# Patient Record
Sex: FEMALE | Race: WHITE | Employment: UNEMPLOYED | URBAN - NONMETROPOLITAN AREA
[De-identification: names, ages, dates, MRNs, and addresses within clinical notes are randomized per-mention and may not be internally consistent; named-entity substitution may affect disease eponyms.]

---

## 2017-01-09 RX ORDER — CLONAZEPAM 1 MG/1
TABLET ORAL
Qty: 90 TABLET | Refills: 0 | Status: SHIPPED | OUTPATIENT
Start: 2017-01-09 | End: 2017-02-06 | Stop reason: SDUPTHER

## 2017-01-09 RX ORDER — OMEPRAZOLE 20 MG/1
CAPSULE, DELAYED RELEASE ORAL
Qty: 180 CAPSULE | Refills: 3 | Status: SHIPPED | OUTPATIENT
Start: 2017-01-09 | End: 2018-01-04 | Stop reason: SDUPTHER

## 2017-01-24 DIAGNOSIS — R10.9 CHRONIC ABDOMINAL PAIN: ICD-10-CM

## 2017-01-24 DIAGNOSIS — G89.29 CHRONIC ABDOMINAL PAIN: ICD-10-CM

## 2017-01-26 RX ORDER — FENTANYL 50 UG/H
1 PATCH TRANSDERMAL
Qty: 10 PATCH | Refills: 0 | Status: SHIPPED | OUTPATIENT
Start: 2017-01-29 | End: 2017-03-01 | Stop reason: SDUPTHER

## 2017-01-30 ENCOUNTER — OFFICE VISIT (OUTPATIENT)
Dept: INTERNAL MEDICINE | Age: 62
End: 2017-01-30

## 2017-01-30 VITALS
DIASTOLIC BLOOD PRESSURE: 56 MMHG | HEART RATE: 72 BPM | HEIGHT: 69 IN | SYSTOLIC BLOOD PRESSURE: 112 MMHG | WEIGHT: 156.8 LBS | BODY MASS INDEX: 23.22 KG/M2

## 2017-01-30 DIAGNOSIS — D69.6 THROMBOCYTOPENIA (HCC): ICD-10-CM

## 2017-01-30 DIAGNOSIS — K83.01 PRIMARY SCLEROSING CHOLANGITIS: ICD-10-CM

## 2017-01-30 DIAGNOSIS — I48.91 ATRIAL FIBRILLATION WITH RVR (HCC): ICD-10-CM

## 2017-01-30 DIAGNOSIS — E11.9 CONTROLLED TYPE 2 DIABETES MELLITUS WITHOUT COMPLICATION, WITHOUT LONG-TERM CURRENT USE OF INSULIN (HCC): Primary | ICD-10-CM

## 2017-01-30 DIAGNOSIS — F32.9 MAJOR DEPRESSION, CHRONIC: ICD-10-CM

## 2017-01-30 DIAGNOSIS — G89.29 CHRONIC ABDOMINAL PAIN: ICD-10-CM

## 2017-01-30 DIAGNOSIS — N18.30 CHRONIC RENAL FAILURE, STAGE 3 (MODERATE) (HCC): ICD-10-CM

## 2017-01-30 DIAGNOSIS — Z23 NEED FOR PROPHYLACTIC VACCINATION AGAINST STREPTOCOCCUS PNEUMONIAE (PNEUMOCOCCUS): ICD-10-CM

## 2017-01-30 DIAGNOSIS — I10 ESSENTIAL HYPERTENSION: ICD-10-CM

## 2017-01-30 DIAGNOSIS — Z94.4 LIVER TRANSPLANTED (HCC): ICD-10-CM

## 2017-01-30 DIAGNOSIS — E78.5 DYSLIPIDEMIA: ICD-10-CM

## 2017-01-30 DIAGNOSIS — N82.9 FISTULA INVOLVING FEMALE GENITAL TRACT: ICD-10-CM

## 2017-01-30 DIAGNOSIS — R10.9 CHRONIC ABDOMINAL PAIN: ICD-10-CM

## 2017-01-30 DIAGNOSIS — E03.8 OTHER SPECIFIED HYPOTHYROIDISM: ICD-10-CM

## 2017-01-30 DIAGNOSIS — E55.9 VITAMIN D DEFICIENCY: ICD-10-CM

## 2017-01-30 DIAGNOSIS — M81.0 OSTEOPOROSIS: ICD-10-CM

## 2017-01-30 DIAGNOSIS — I34.0 MITRAL VALVE INSUFFICIENCY, UNSPECIFIED ETIOLOGY: ICD-10-CM

## 2017-01-30 DIAGNOSIS — Z01.89 NEEDS SLEEP APNEA ASSESSMENT: ICD-10-CM

## 2017-01-30 PROCEDURE — 99213 OFFICE O/P EST LOW 20 MIN: CPT | Performed by: INTERNAL MEDICINE

## 2017-01-30 PROCEDURE — 90732 PPSV23 VACC 2 YRS+ SUBQ/IM: CPT | Performed by: INTERNAL MEDICINE

## 2017-01-30 PROCEDURE — 90471 IMMUNIZATION ADMIN: CPT | Performed by: INTERNAL MEDICINE

## 2017-01-30 ASSESSMENT — ENCOUNTER SYMPTOMS
DIARRHEA: 0
VOMITING: 0
EYE PAIN: 0
SORE THROAT: 0
COUGH: 0
EYE DISCHARGE: 0
WHEEZING: 0
DOUBLE VISION: 0
BLOOD IN STOOL: 0
BLURRED VISION: 0
CONSTIPATION: 0
SHORTNESS OF BREATH: 0
NAUSEA: 0
ABDOMINAL PAIN: 0

## 2017-01-31 ENCOUNTER — OFFICE VISIT (OUTPATIENT)
Dept: PAIN MANAGEMENT | Age: 62
End: 2017-01-31

## 2017-01-31 VITALS
WEIGHT: 156.8 LBS | HEART RATE: 72 BPM | RESPIRATION RATE: 16 BRPM | SYSTOLIC BLOOD PRESSURE: 116 MMHG | HEIGHT: 69 IN | BODY MASS INDEX: 23.22 KG/M2 | DIASTOLIC BLOOD PRESSURE: 80 MMHG

## 2017-01-31 DIAGNOSIS — Z13.89: Primary | ICD-10-CM

## 2017-01-31 DIAGNOSIS — R10.9 CHRONIC ABDOMINAL PAIN: ICD-10-CM

## 2017-01-31 DIAGNOSIS — Z02.83 ENCOUNTER FOR DRUG SCREENING: ICD-10-CM

## 2017-01-31 DIAGNOSIS — G89.29 CHRONIC ABDOMINAL PAIN: ICD-10-CM

## 2017-01-31 DIAGNOSIS — G62.89 OTHER POLYNEUROPATHY: ICD-10-CM

## 2017-01-31 PROCEDURE — 99214 OFFICE O/P EST MOD 30 MIN: CPT | Performed by: NURSE PRACTITIONER

## 2017-01-31 PROCEDURE — 80307 DRUG TEST PRSMV CHEM ANLYZR: CPT | Performed by: NURSE PRACTITIONER

## 2017-01-31 ASSESSMENT — ENCOUNTER SYMPTOMS
ABDOMINAL PAIN: 1
VOMITING: 0
DIARRHEA: 1
CONSTIPATION: 0
NAUSEA: 1

## 2017-02-05 LAB
6-ACETYLMORPHINE, UR: NOT DETECTED
7-AMINOCLONAZEPAM, URINE: PRESENT
ALPHA-OH-ALPRAZ, URINE: NOT DETECTED
ALPRAZOLAM, URINE: NOT DETECTED
AMPHETAMINES, URINE: NOT DETECTED
BARBITURATES, URINE: NOT DETECTED
BENZOYLECGONINE, UR: NOT DETECTED
BUPRENORPHINE URINE: NOT DETECTED
CARISOPRODOL, UR: NOT DETECTED
CLONAZEPAM, URINE: NOT DETECTED
CODEINE, URINE: NOT DETECTED
CREATININE URINE: 113.4 MG/DL (ref 20–400)
DIAZEPAM, URINE: NOT DETECTED
EER PAIN MGT DRUG PANEL, HIGH RES/EMIT U: NORMAL
ETHYL GLUCURONIDE UR: NOT DETECTED
FENTANYL URINE: PRESENT
HYDROCODONE, URINE: NOT DETECTED
HYDROMORPHONE, URINE: NOT DETECTED
LORAZEPAM, URINE: NOT DETECTED
MARIJUANA METAB, UR: NOT DETECTED
MDA, UR: NOT DETECTED
MDEA, EVE, UR: NOT DETECTED
MDMA URINE: NOT DETECTED
MEPERIDINE METAB, UR: NOT DETECTED
METHADONE, URINE: NOT DETECTED
METHAMPHETAMINE, URINE: NOT DETECTED
METHYLPHENIDATE: NOT DETECTED
MIDAZOLAM, URINE: NOT DETECTED
MORPHINE URINE: NOT DETECTED
NORBUPRENORPHINE, URINE: NOT DETECTED
NORDIAZEPAM, URINE: NOT DETECTED
NORFENTANYL, URINE: PRESENT
NORHYDROCODONE, URINE: NOT DETECTED
NOROXYCODONE, URINE: NOT DETECTED
NOROXYMORPHONE, URINE: NOT DETECTED
OXAZEPAM, URINE: NOT DETECTED
OXYCODONE URINE: NOT DETECTED
OXYMORPHONE, URINE: NOT DETECTED
PAIN MGT DRUG PANEL, HI RES, UR: NORMAL
PCP,URINE: NOT DETECTED
PHENTERMINE, UR: NOT DETECTED
PROPOXYPHENE, URINE: NOT DETECTED
TAPENTADOL, URINE: NOT DETECTED
TAPENTADOL-O-SULFATE, URINE: NOT DETECTED
TEMAZEPAM, URINE: NOT DETECTED
TRAMADOL, URINE: NOT DETECTED
ZOLPIDEM, URINE: NOT DETECTED

## 2017-02-06 DIAGNOSIS — E03.8 OTHER SPECIFIED HYPOTHYROIDISM: Primary | ICD-10-CM

## 2017-02-06 RX ORDER — CLONAZEPAM 1 MG/1
TABLET ORAL
Qty: 90 TABLET | Refills: 0 | OUTPATIENT
Start: 2017-02-06 | End: 2017-03-06 | Stop reason: SDUPTHER

## 2017-02-28 ENCOUNTER — HOSPITAL ENCOUNTER (EMERGENCY)
Age: 62
Discharge: HOME | End: 2017-03-01
Attending: EMERGENCY MEDICINE
Payer: COMMERCIAL

## 2017-02-28 DIAGNOSIS — R11.2 NAUSEA VOMITING AND DIARRHEA: Primary | ICD-10-CM

## 2017-02-28 DIAGNOSIS — R19.7 NAUSEA VOMITING AND DIARRHEA: Primary | ICD-10-CM

## 2017-02-28 DIAGNOSIS — R10.30 LOWER ABDOMINAL PAIN: ICD-10-CM

## 2017-02-28 LAB
ABSOLUTE EOS #: 0.7 K/UL (ref 0–0.4)
ABSOLUTE LYMPH #: 1.8 K/UL (ref 1–4.8)
ABSOLUTE MONO #: 0.3 K/UL (ref 0.1–1.2)
ALBUMIN SERPL-MCNC: 4.1 G/DL (ref 3.5–5.2)
ALBUMIN/GLOBULIN RATIO: 1.2 (ref 1–2.5)
ALP BLD-CCNC: 86 U/L (ref 35–104)
ALT SERPL-CCNC: 18 U/L (ref 5–33)
ANION GAP SERPL CALCULATED.3IONS-SCNC: 14 MMOL/L (ref 9–17)
AST SERPL-CCNC: 25 U/L
BASOPHILS # BLD: 1 % (ref 0–2)
BASOPHILS ABSOLUTE: 0 K/UL (ref 0–0.2)
BILIRUB SERPL-MCNC: 0.18 MG/DL (ref 0.3–1.2)
BUN BLDV-MCNC: 29 MG/DL (ref 6–20)
BUN/CREAT BLD: 19 (ref 9–20)
CALCIUM SERPL-MCNC: 9.3 MG/DL (ref 8.6–10.4)
CHLORIDE BLD-SCNC: 101 MMOL/L (ref 98–107)
CO2: 25 MMOL/L (ref 20–31)
CREAT SERPL-MCNC: 1.53 MG/DL (ref 0.5–0.9)
DIFFERENTIAL TYPE: ABNORMAL
EOSINOPHILS RELATIVE PERCENT: 15 % (ref 1–4)
GFR AFRICAN AMERICAN: 42 ML/MIN
GFR NON-AFRICAN AMERICAN: 35 ML/MIN
GFR SERPL CREATININE-BSD FRML MDRD: ABNORMAL ML/MIN/{1.73_M2}
GFR SERPL CREATININE-BSD FRML MDRD: ABNORMAL ML/MIN/{1.73_M2}
GLUCOSE BLD-MCNC: 159 MG/DL (ref 70–99)
HCT VFR BLD CALC: 35.5 % (ref 36–46)
HEMOGLOBIN: 12 G/DL (ref 12–16)
LIPASE: 37 U/L (ref 13–60)
LYMPHOCYTES # BLD: 39 % (ref 24–44)
MAGNESIUM: 1.9 MG/DL (ref 1.6–2.6)
MCH RBC QN AUTO: 30 PG (ref 26–34)
MCHC RBC AUTO-ENTMCNC: 33.8 G/DL (ref 31–37)
MCV RBC AUTO: 88.8 FL (ref 80–100)
MONOCYTES # BLD: 7 % (ref 1–7)
PDW BLD-RTO: 14.2 % (ref 11–14.5)
PLATELET # BLD: 141 K/UL (ref 140–450)
PLATELET ESTIMATE: ABNORMAL
PMV BLD AUTO: 7 FL (ref 6–12)
POTASSIUM SERPL-SCNC: 3.7 MMOL/L (ref 3.7–5.3)
RBC # BLD: 4 M/UL (ref 4–5.2)
RBC # BLD: ABNORMAL 10*6/UL
SEG NEUTROPHILS: 38 % (ref 36–66)
SEGMENTED NEUTROPHILS ABSOLUTE COUNT: 1.9 K/UL (ref 1.8–7.7)
SODIUM BLD-SCNC: 140 MMOL/L (ref 135–144)
TOTAL PROTEIN: 7.5 G/DL (ref 6.4–8.3)
WBC # BLD: 4.7 K/UL (ref 3.5–11)
WBC # BLD: ABNORMAL 10*3/UL

## 2017-02-28 PROCEDURE — 99284 EMERGENCY DEPT VISIT MOD MDM: CPT

## 2017-02-28 PROCEDURE — 36415 COLL VENOUS BLD VENIPUNCTURE: CPT

## 2017-02-28 PROCEDURE — 6360000002 HC RX W HCPCS: Performed by: EMERGENCY MEDICINE

## 2017-02-28 PROCEDURE — 85025 COMPLETE CBC W/AUTO DIFF WBC: CPT

## 2017-02-28 PROCEDURE — 6370000000 HC RX 637 (ALT 250 FOR IP): Performed by: EMERGENCY MEDICINE

## 2017-02-28 PROCEDURE — 2580000003 HC RX 258: Performed by: EMERGENCY MEDICINE

## 2017-02-28 PROCEDURE — 83690 ASSAY OF LIPASE: CPT

## 2017-02-28 PROCEDURE — 80053 COMPREHEN METABOLIC PANEL: CPT

## 2017-02-28 PROCEDURE — 83735 ASSAY OF MAGNESIUM: CPT

## 2017-02-28 RX ORDER — ONDANSETRON 4 MG/1
4 TABLET, ORALLY DISINTEGRATING ORAL EVERY 8 HOURS PRN
Qty: 20 TABLET | Refills: 0 | Status: SHIPPED | OUTPATIENT
Start: 2017-02-28

## 2017-02-28 RX ORDER — METRONIDAZOLE 500 MG/1
500 TABLET ORAL 2 TIMES DAILY
Qty: 14 TABLET | Refills: 0 | Status: SHIPPED | OUTPATIENT
Start: 2017-02-28 | End: 2017-03-07

## 2017-02-28 RX ORDER — CIPROFLOXACIN 2 MG/ML
400 INJECTION, SOLUTION INTRAVENOUS ONCE
Status: COMPLETED | OUTPATIENT
Start: 2017-02-28 | End: 2017-02-28

## 2017-02-28 RX ORDER — ONDANSETRON 2 MG/ML
4 INJECTION INTRAMUSCULAR; INTRAVENOUS ONCE
Status: COMPLETED | OUTPATIENT
Start: 2017-02-28 | End: 2017-02-28

## 2017-02-28 RX ORDER — CIPROFLOXACIN 500 MG/1
500 TABLET, FILM COATED ORAL 2 TIMES DAILY
Qty: 14 TABLET | Refills: 0 | Status: SHIPPED | OUTPATIENT
Start: 2017-02-28 | End: 2017-03-07

## 2017-02-28 RX ORDER — ACETAMINOPHEN 325 MG/1
650 TABLET ORAL ONCE
Status: COMPLETED | OUTPATIENT
Start: 2017-02-28 | End: 2017-02-28

## 2017-02-28 RX ORDER — METRONIDAZOLE 250 MG/1
500 TABLET ORAL ONCE
Status: COMPLETED | OUTPATIENT
Start: 2017-02-28 | End: 2017-02-28

## 2017-02-28 RX ORDER — 0.9 % SODIUM CHLORIDE 0.9 %
1000 INTRAVENOUS SOLUTION INTRAVENOUS ONCE
Status: COMPLETED | OUTPATIENT
Start: 2017-02-28 | End: 2017-02-28

## 2017-02-28 RX ADMIN — METRONIDAZOLE 500 MG: 250 TABLET ORAL at 22:58

## 2017-02-28 RX ADMIN — SODIUM CHLORIDE 1000 ML: 9 INJECTION, SOLUTION INTRAVENOUS at 20:53

## 2017-02-28 RX ADMIN — ONDANSETRON 4 MG: 2 INJECTION INTRAMUSCULAR; INTRAVENOUS at 22:03

## 2017-02-28 RX ADMIN — ACETAMINOPHEN 650 MG: 325 TABLET ORAL at 22:03

## 2017-02-28 RX ADMIN — CIPROFLOXACIN 400 MG: 2 INJECTION, SOLUTION INTRAVENOUS at 22:58

## 2017-02-28 ASSESSMENT — PAIN SCALES - GENERAL
PAINLEVEL_OUTOF10: 5
PAINLEVEL_OUTOF10: 5
PAINLEVEL_OUTOF10: 8

## 2017-02-28 ASSESSMENT — PAIN DESCRIPTION - PROGRESSION: CLINICAL_PROGRESSION: NOT CHANGED

## 2017-02-28 ASSESSMENT — ENCOUNTER SYMPTOMS
BACK PAIN: 0
ABDOMINAL PAIN: 1
RHINORRHEA: 0
SHORTNESS OF BREATH: 0
EYE PAIN: 0
VOMITING: 1
NAUSEA: 1
DIARRHEA: 1
COUGH: 0
SORE THROAT: 0

## 2017-02-28 ASSESSMENT — PAIN DESCRIPTION - LOCATION: LOCATION: ABDOMEN

## 2017-02-28 ASSESSMENT — PAIN DESCRIPTION - FREQUENCY: FREQUENCY: INTERMITTENT

## 2017-02-28 ASSESSMENT — PAIN DESCRIPTION - DESCRIPTORS: DESCRIPTORS: CRAMPING

## 2017-02-28 ASSESSMENT — PAIN DESCRIPTION - ONSET: ONSET: ON-GOING

## 2017-02-28 ASSESSMENT — PAIN DESCRIPTION - PAIN TYPE: TYPE: ACUTE PAIN

## 2017-03-01 VITALS
SYSTOLIC BLOOD PRESSURE: 112 MMHG | BODY MASS INDEX: 21.92 KG/M2 | DIASTOLIC BLOOD PRESSURE: 66 MMHG | HEART RATE: 65 BPM | TEMPERATURE: 97.3 F | OXYGEN SATURATION: 98 % | WEIGHT: 148 LBS | HEIGHT: 69 IN | RESPIRATION RATE: 14 BRPM

## 2017-03-01 DIAGNOSIS — R10.9 CHRONIC ABDOMINAL PAIN: ICD-10-CM

## 2017-03-01 DIAGNOSIS — G89.29 CHRONIC ABDOMINAL PAIN: ICD-10-CM

## 2017-03-01 PROCEDURE — 96375 TX/PRO/DX INJ NEW DRUG ADDON: CPT

## 2017-03-01 PROCEDURE — 96365 THER/PROPH/DIAG IV INF INIT: CPT

## 2017-03-01 ASSESSMENT — PAIN SCALES - GENERAL: PAINLEVEL_OUTOF10: 5

## 2017-03-03 RX ORDER — FENTANYL 50 UG/H
1 PATCH TRANSDERMAL
Qty: 10 PATCH | Refills: 0 | Status: SHIPPED | OUTPATIENT
Start: 2017-03-03 | End: 2017-04-07 | Stop reason: SDUPTHER

## 2017-03-06 RX ORDER — CLONAZEPAM 1 MG/1
TABLET ORAL
Qty: 90 TABLET | Refills: 0 | Status: SHIPPED | OUTPATIENT
Start: 2017-03-06 | End: 2017-04-04 | Stop reason: SDUPTHER

## 2017-03-07 ENCOUNTER — TELEPHONE (OUTPATIENT)
Dept: INTERNAL MEDICINE | Age: 62
End: 2017-03-07

## 2017-03-17 LAB
CHOLESTEROL, TOTAL: 143 MG/DL
CHOLESTEROL/HDL RATIO: 3.04
HDLC SERPL-MCNC: 47 MG/DL (ref 35–70)
LDL CHOLESTEROL CALCULATED: 61 MG/DL (ref 0–160)
TRIGL SERPL-MCNC: 176 MG/DL
VLDLC SERPL CALC-MCNC: 35 MG/DL

## 2017-04-04 RX ORDER — CLONAZEPAM 1 MG/1
TABLET ORAL
Qty: 90 TABLET | Refills: 0 | Status: SHIPPED | OUTPATIENT
Start: 2017-04-04 | End: 2017-05-01 | Stop reason: SDUPTHER

## 2017-04-07 ENCOUNTER — OFFICE VISIT (OUTPATIENT)
Dept: PAIN MANAGEMENT | Age: 62
End: 2017-04-07
Payer: COMMERCIAL

## 2017-04-07 VITALS
BODY MASS INDEX: 23.31 KG/M2 | DIASTOLIC BLOOD PRESSURE: 70 MMHG | HEIGHT: 69 IN | SYSTOLIC BLOOD PRESSURE: 102 MMHG | WEIGHT: 157.4 LBS | HEART RATE: 76 BPM

## 2017-04-07 DIAGNOSIS — G89.29 CHRONIC ABDOMINAL PAIN: ICD-10-CM

## 2017-04-07 DIAGNOSIS — R10.9 CHRONIC ABDOMINAL PAIN: ICD-10-CM

## 2017-04-07 DIAGNOSIS — G62.89 OTHER POLYNEUROPATHY: Primary | ICD-10-CM

## 2017-04-07 PROCEDURE — 99214 OFFICE O/P EST MOD 30 MIN: CPT | Performed by: NURSE PRACTITIONER

## 2017-04-07 ASSESSMENT — ENCOUNTER SYMPTOMS
NAUSEA: 1
ABDOMINAL PAIN: 1
DIARRHEA: 1
CONSTIPATION: 0
VOMITING: 0

## 2017-04-10 RX ORDER — FENTANYL 50 UG/H
1 PATCH TRANSDERMAL
Qty: 10 PATCH | Refills: 0 | Status: SHIPPED | OUTPATIENT
Start: 2017-04-10 | End: 2017-05-22 | Stop reason: SDUPTHER

## 2017-04-24 ENCOUNTER — OFFICE VISIT (OUTPATIENT)
Dept: PRIMARY CARE CLINIC | Age: 62
End: 2017-04-24
Payer: COMMERCIAL

## 2017-04-24 VITALS
WEIGHT: 155.4 LBS | OXYGEN SATURATION: 98 % | RESPIRATION RATE: 12 BRPM | TEMPERATURE: 98.4 F | SYSTOLIC BLOOD PRESSURE: 96 MMHG | BODY MASS INDEX: 23.02 KG/M2 | HEIGHT: 69 IN | HEART RATE: 87 BPM | DIASTOLIC BLOOD PRESSURE: 78 MMHG

## 2017-04-24 DIAGNOSIS — T14.8XXA BLOOD BLISTER: Primary | ICD-10-CM

## 2017-04-24 PROCEDURE — 99213 OFFICE O/P EST LOW 20 MIN: CPT | Performed by: NURSE PRACTITIONER

## 2017-04-24 RX ORDER — AMOXICILLIN 500 MG/1
500 CAPSULE ORAL 3 TIMES DAILY
Qty: 15 CAPSULE | Refills: 0 | Status: SHIPPED | OUTPATIENT
Start: 2017-04-24 | End: 2017-04-29

## 2017-04-24 ASSESSMENT — ENCOUNTER SYMPTOMS
ABDOMINAL PAIN: 0
COUGH: 0
TROUBLE SWALLOWING: 0
NAUSEA: 0
ALLERGIC/IMMUNOLOGIC NEGATIVE: 1
EYES NEGATIVE: 1
DIARRHEA: 0
RESPIRATORY NEGATIVE: 1
GASTROINTESTINAL NEGATIVE: 1
VOMITING: 0
SHORTNESS OF BREATH: 0
CONSTIPATION: 0
SINUS PRESSURE: 0
CHEST TIGHTNESS: 0

## 2017-05-01 RX ORDER — CLONAZEPAM 1 MG/1
TABLET ORAL
Qty: 90 TABLET | Refills: 0 | Status: SHIPPED | OUTPATIENT
Start: 2017-05-01 | End: 2017-06-14 | Stop reason: SDUPTHER

## 2017-05-22 DIAGNOSIS — G89.29 CHRONIC ABDOMINAL PAIN: ICD-10-CM

## 2017-05-22 DIAGNOSIS — R10.9 CHRONIC ABDOMINAL PAIN: ICD-10-CM

## 2017-05-23 RX ORDER — FENTANYL 50 UG/H
1 PATCH TRANSDERMAL
Qty: 10 PATCH | Refills: 0 | Status: SHIPPED | OUTPATIENT
Start: 2017-05-23 | End: 2017-07-25 | Stop reason: DRUGHIGH

## 2017-06-07 ENCOUNTER — OFFICE VISIT (OUTPATIENT)
Dept: PAIN MANAGEMENT | Age: 62
End: 2017-06-07
Payer: COMMERCIAL

## 2017-06-07 ENCOUNTER — OFFICE VISIT (OUTPATIENT)
Dept: OPHTHALMOLOGY | Age: 62
End: 2017-06-07
Payer: COMMERCIAL

## 2017-06-07 VITALS
DIASTOLIC BLOOD PRESSURE: 82 MMHG | BODY MASS INDEX: 23.61 KG/M2 | SYSTOLIC BLOOD PRESSURE: 114 MMHG | HEART RATE: 64 BPM | RESPIRATION RATE: 16 BRPM | HEIGHT: 69 IN | WEIGHT: 159.4 LBS

## 2017-06-07 DIAGNOSIS — H25.9 SENILE CATARACTS OF BOTH EYES: ICD-10-CM

## 2017-06-07 DIAGNOSIS — Z02.83 ENCOUNTER FOR DRUG SCREENING: ICD-10-CM

## 2017-06-07 DIAGNOSIS — G62.89 OTHER POLYNEUROPATHY: ICD-10-CM

## 2017-06-07 DIAGNOSIS — R10.9 CHRONIC ABDOMINAL PAIN: Primary | ICD-10-CM

## 2017-06-07 DIAGNOSIS — Z79.891 ENCOUNTER FOR LONG-TERM OPIATE ANALGESIC USE: ICD-10-CM

## 2017-06-07 DIAGNOSIS — G89.29 CHRONIC ABDOMINAL PAIN: Primary | ICD-10-CM

## 2017-06-07 DIAGNOSIS — E11.9 CONTROLLED TYPE 2 DIABETES MELLITUS WITHOUT COMPLICATION, WITHOUT LONG-TERM CURRENT USE OF INSULIN (HCC): Primary | ICD-10-CM

## 2017-06-07 PROCEDURE — 99214 OFFICE O/P EST MOD 30 MIN: CPT | Performed by: NURSE PRACTITIONER

## 2017-06-07 PROCEDURE — 99204 OFFICE O/P NEW MOD 45 MIN: CPT | Performed by: OPHTHALMOLOGY

## 2017-06-07 PROCEDURE — 80307 DRUG TEST PRSMV CHEM ANLYZR: CPT | Performed by: NURSE PRACTITIONER

## 2017-06-07 RX ORDER — TROPICAMIDE 10 MG/ML
1 SOLUTION/ DROPS OPHTHALMIC ONCE
Status: COMPLETED | OUTPATIENT
Start: 2017-06-07 | End: 2017-06-07

## 2017-06-07 RX ORDER — FENTANYL 37.5 UG/H
1 PATCH, EXTENDED RELEASE TRANSDERMAL
Qty: 10 PATCH | Refills: 0 | Status: SHIPPED | OUTPATIENT
Start: 2017-06-22 | End: 2017-07-25 | Stop reason: SDUPTHER

## 2017-06-07 RX ORDER — BENOXINATE HCL/FLUORESCEIN SOD 0.4%-0.25%
1 DROPS OPHTHALMIC (EYE) ONCE
Status: COMPLETED | OUTPATIENT
Start: 2017-06-07 | End: 2017-06-07

## 2017-06-07 RX ORDER — PHENYLEPHRINE HCL 2.5 %
1 DROPS OPHTHALMIC (EYE) ONCE
Status: COMPLETED | OUTPATIENT
Start: 2017-06-07 | End: 2017-06-07

## 2017-06-07 RX ADMIN — Medication 1 DROP: at 14:46

## 2017-06-07 RX ADMIN — Medication 1 DROP: at 14:45

## 2017-06-07 RX ADMIN — TROPICAMIDE 1 DROP: 10 SOLUTION/ DROPS OPHTHALMIC at 14:46

## 2017-06-07 ASSESSMENT — ENCOUNTER SYMPTOMS
ABDOMINAL PAIN: 1
CONSTIPATION: 0
DIARRHEA: 1
BLURRED VISION: 1
RESPIRATORY NEGATIVE: 1
GASTROINTESTINAL NEGATIVE: 1
VOMITING: 0
NAUSEA: 1

## 2017-06-07 ASSESSMENT — CONF VISUAL FIELD
OD_NORMAL: 1
OS_NORMAL: 1
METHOD: COUNTING FINGERS

## 2017-06-07 ASSESSMENT — SLIT LAMP EXAM - LIDS
COMMENTS: 1+ DERMATOCHALASIS - UPPER LID
COMMENTS: 1+ DERMATOCHALASIS - UPPER LID

## 2017-06-07 ASSESSMENT — TONOMETRY
OD_IOP_MMHG: 18
OS_IOP_MMHG: 21
IOP_METHOD: APPLANATION W FLURESS DROP

## 2017-06-07 ASSESSMENT — VISUAL ACUITY
METHOD: SNELLEN - LINEAR
OS_CC: 20/40
CORRECTION_TYPE: GLASSES

## 2017-06-11 LAB
6-ACETYLMORPHINE, UR: NOT DETECTED
7-AMINOCLONAZEPAM, URINE: PRESENT
ALPHA-OH-ALPRAZ, URINE: NOT DETECTED
ALPRAZOLAM, URINE: NOT DETECTED
AMPHETAMINES, URINE: NOT DETECTED
BARBITURATES, URINE: NOT DETECTED
BENZOYLECGONINE, UR: NOT DETECTED
BUPRENORPHINE URINE: NOT DETECTED
CARISOPRODOL, UR: NOT DETECTED
CLONAZEPAM, URINE: NOT DETECTED
CODEINE, URINE: NOT DETECTED
CREATININE URINE: 150.2 MG/DL (ref 20–400)
DIAZEPAM, URINE: NOT DETECTED
EER PAIN MGT DRUG PANEL, HIGH RES/EMIT U: NORMAL
ETHYL GLUCURONIDE UR: NORMAL
FENTANYL URINE: PRESENT
HYDROCODONE, URINE: NOT DETECTED
HYDROMORPHONE, URINE: PRESENT
LORAZEPAM, URINE: NOT DETECTED
MARIJUANA METAB, UR: NOT DETECTED
MDA, UR: NOT DETECTED
MDEA, EVE, UR: NOT DETECTED
MDMA URINE: NOT DETECTED
MEPERIDINE METAB, UR: NOT DETECTED
METHADONE, URINE: NOT DETECTED
METHAMPHETAMINE, URINE: NOT DETECTED
METHYLPHENIDATE: NOT DETECTED
MIDAZOLAM, URINE: NOT DETECTED
MORPHINE URINE: NOT DETECTED
NORBUPRENORPHINE, URINE: NOT DETECTED
NORDIAZEPAM, URINE: NOT DETECTED
NORFENTANYL, URINE: PRESENT
NORHYDROCODONE, URINE: NOT DETECTED
NOROXYCODONE, URINE: NOT DETECTED
NOROXYMORPHONE, URINE: NOT DETECTED
OXAZEPAM, URINE: NOT DETECTED
OXYCODONE URINE: NOT DETECTED
OXYMORPHONE, URINE: NOT DETECTED
PAIN MGT DRUG PANEL, HI RES, UR: NORMAL
PCP,URINE: NOT DETECTED
PHENTERMINE, UR: NOT DETECTED
PROPOXYPHENE, URINE: NOT DETECTED
TAPENTADOL, URINE: NOT DETECTED
TAPENTADOL-O-SULFATE, URINE: NOT DETECTED
TEMAZEPAM, URINE: NOT DETECTED
TRAMADOL, URINE: NOT DETECTED
ZOLPIDEM, URINE: NOT DETECTED

## 2017-06-14 RX ORDER — CLONAZEPAM 1 MG/1
TABLET ORAL
Qty: 90 TABLET | Refills: 0 | Status: SHIPPED | OUTPATIENT
Start: 2017-06-14 | End: 2017-09-27 | Stop reason: SDUPTHER

## 2017-06-22 RX ORDER — LEVOTHYROXINE SODIUM 0.12 MG/1
TABLET ORAL
Qty: 30 TABLET | Refills: 11 | Status: SHIPPED | OUTPATIENT
Start: 2017-06-22 | End: 2018-06-28 | Stop reason: SDUPTHER

## 2017-06-28 ENCOUNTER — OFFICE VISIT (OUTPATIENT)
Dept: OPHTHALMOLOGY | Age: 62
End: 2017-06-28
Payer: COMMERCIAL

## 2017-06-28 DIAGNOSIS — E11.9 CONTROLLED TYPE 2 DIABETES MELLITUS WITHOUT COMPLICATION, WITHOUT LONG-TERM CURRENT USE OF INSULIN (HCC): ICD-10-CM

## 2017-06-28 DIAGNOSIS — H25.9 SENILE CATARACTS OF BOTH EYES: ICD-10-CM

## 2017-06-28 DIAGNOSIS — H35.053: Primary | ICD-10-CM

## 2017-06-28 PROCEDURE — 99214 OFFICE O/P EST MOD 30 MIN: CPT | Performed by: OPHTHALMOLOGY

## 2017-06-28 PROCEDURE — 92134 CPTRZ OPH DX IMG PST SGM RTA: CPT | Performed by: OPHTHALMOLOGY

## 2017-06-28 RX ORDER — TROPICAMIDE 10 MG/ML
1 SOLUTION/ DROPS OPHTHALMIC ONCE
Status: COMPLETED | OUTPATIENT
Start: 2017-06-28 | End: 2017-06-28

## 2017-06-28 RX ORDER — BENOXINATE HCL/FLUORESCEIN SOD 0.4%-0.25%
1 DROPS OPHTHALMIC (EYE) ONCE
Status: COMPLETED | OUTPATIENT
Start: 2017-06-28 | End: 2017-06-28

## 2017-06-28 RX ORDER — PHENYLEPHRINE HCL 2.5 %
1 DROPS OPHTHALMIC (EYE) ONCE
Status: COMPLETED | OUTPATIENT
Start: 2017-06-28 | End: 2017-06-28

## 2017-06-28 RX ADMIN — Medication 1 DROP: at 16:03

## 2017-06-28 RX ADMIN — TROPICAMIDE 1 DROP: 10 SOLUTION/ DROPS OPHTHALMIC at 16:04

## 2017-06-28 ASSESSMENT — TONOMETRY
IOP_METHOD: TONOPEN
OS_IOP_MMHG: 21
OD_IOP_MMHG: 16

## 2017-06-28 ASSESSMENT — REFRACTION_MANIFEST
OD_CYLINDER: -0.50
OS_CYLINDER: -0.25
OD_AXIS: 120
OD_SPHERE: +1.75
OS_SPHERE: +1.00
OS_AXIS: 013

## 2017-06-28 ASSESSMENT — VISUAL ACUITY
OS_CC: 20/40
OS_BAT_MED: 20/25
METHOD: SNELLEN - LINEAR
CORRECTION_TYPE: GLASSES

## 2017-06-28 ASSESSMENT — SLIT LAMP EXAM - LIDS
COMMENTS: 1+ DERMATOCHALASIS - UPPER LID
COMMENTS: 1+ DERMATOCHALASIS - UPPER LID

## 2017-07-06 ENCOUNTER — OFFICE VISIT (OUTPATIENT)
Dept: OPHTHALMOLOGY | Age: 62
End: 2017-07-06
Payer: COMMERCIAL

## 2017-07-06 DIAGNOSIS — H25.13 NUCLEAR SCLEROSIS, BILATERAL: Primary | ICD-10-CM

## 2017-07-06 PROCEDURE — 92136 OPHTHALMIC BIOMETRY: CPT | Performed by: OPHTHALMOLOGY

## 2017-07-07 ENCOUNTER — TELEPHONE (OUTPATIENT)
Dept: OPHTHALMOLOGY | Age: 62
End: 2017-07-07

## 2017-07-13 RX ORDER — SULFAMETHOXAZOLE AND TRIMETHOPRIM 800; 160 MG/1; MG/1
TABLET ORAL
Qty: 30 TABLET | Refills: 3 | Status: SHIPPED | OUTPATIENT
Start: 2017-07-13 | End: 2017-11-03 | Stop reason: SDUPTHER

## 2017-07-25 ENCOUNTER — OFFICE VISIT (OUTPATIENT)
Dept: INTERNAL MEDICINE | Age: 62
End: 2017-07-25
Payer: COMMERCIAL

## 2017-07-25 ENCOUNTER — TELEPHONE (OUTPATIENT)
Dept: PAIN MANAGEMENT | Age: 62
End: 2017-07-25

## 2017-07-25 VITALS
HEIGHT: 69 IN | DIASTOLIC BLOOD PRESSURE: 50 MMHG | WEIGHT: 155.2 LBS | HEART RATE: 84 BPM | BODY MASS INDEX: 22.99 KG/M2 | SYSTOLIC BLOOD PRESSURE: 110 MMHG

## 2017-07-25 DIAGNOSIS — G89.29 CHRONIC ABDOMINAL PAIN: ICD-10-CM

## 2017-07-25 DIAGNOSIS — N82.9 FISTULA INVOLVING FEMALE GENITAL TRACT: ICD-10-CM

## 2017-07-25 DIAGNOSIS — E55.9 VITAMIN D DEFICIENCY: ICD-10-CM

## 2017-07-25 DIAGNOSIS — E78.5 DYSLIPIDEMIA: ICD-10-CM

## 2017-07-25 DIAGNOSIS — R10.9 CHRONIC ABDOMINAL PAIN: ICD-10-CM

## 2017-07-25 DIAGNOSIS — I10 ESSENTIAL HYPERTENSION: ICD-10-CM

## 2017-07-25 DIAGNOSIS — I48.91 ATRIAL FIBRILLATION WITH RVR (HCC): ICD-10-CM

## 2017-07-25 DIAGNOSIS — D69.6 THROMBOCYTOPENIA (HCC): ICD-10-CM

## 2017-07-25 DIAGNOSIS — K83.01 PRIMARY SCLEROSING CHOLANGITIS: ICD-10-CM

## 2017-07-25 DIAGNOSIS — N18.30 CHRONIC RENAL FAILURE, STAGE 3 (MODERATE) (HCC): ICD-10-CM

## 2017-07-25 DIAGNOSIS — E11.9 CONTROLLED TYPE 2 DIABETES MELLITUS WITHOUT COMPLICATION, WITHOUT LONG-TERM CURRENT USE OF INSULIN (HCC): Primary | ICD-10-CM

## 2017-07-25 DIAGNOSIS — F32.9 MAJOR DEPRESSION, CHRONIC: ICD-10-CM

## 2017-07-25 DIAGNOSIS — M81.0 OSTEOPOROSIS WITHOUT CURRENT PATHOLOGICAL FRACTURE, UNSPECIFIED OSTEOPOROSIS TYPE: ICD-10-CM

## 2017-07-25 DIAGNOSIS — Z94.4 LIVER TRANSPLANTED (HCC): ICD-10-CM

## 2017-07-25 DIAGNOSIS — I34.0 MITRAL VALVE INSUFFICIENCY, UNSPECIFIED ETIOLOGY: ICD-10-CM

## 2017-07-25 DIAGNOSIS — E03.8 OTHER SPECIFIED HYPOTHYROIDISM: ICD-10-CM

## 2017-07-25 PROCEDURE — 99214 OFFICE O/P EST MOD 30 MIN: CPT | Performed by: INTERNAL MEDICINE

## 2017-07-25 RX ORDER — SERTRALINE HYDROCHLORIDE 100 MG/1
TABLET, FILM COATED ORAL
Qty: 30 TABLET | Refills: 0 | Status: SHIPPED | OUTPATIENT
Start: 2017-07-25 | End: 2017-07-25 | Stop reason: SDUPTHER

## 2017-07-25 RX ORDER — FENTANYL 37.5 UG/H
1 PATCH, EXTENDED RELEASE TRANSDERMAL
Qty: 10 PATCH | Refills: 0 | Status: SHIPPED | OUTPATIENT
Start: 2017-07-25 | End: 2017-08-08 | Stop reason: SDUPTHER

## 2017-07-26 RX ORDER — SERTRALINE HYDROCHLORIDE 100 MG/1
TABLET, FILM COATED ORAL
Qty: 90 TABLET | Refills: 3 | Status: SHIPPED | OUTPATIENT
Start: 2017-07-26 | End: 2018-07-23 | Stop reason: SDUPTHER

## 2017-07-26 RX ORDER — BUPIVACAINE HYDROCHLORIDE 5 MG/ML
1 INJECTION, SOLUTION EPIDURAL; INTRACAUDAL ONCE
Status: CANCELLED | OUTPATIENT
Start: 2017-07-26 | End: 2017-07-26

## 2017-07-26 RX ORDER — CYCLOPENTOLATE HYDROCHLORIDE 10 MG/ML
1 SOLUTION/ DROPS OPHTHALMIC EVERY 5 MIN PRN
Status: CANCELLED | OUTPATIENT
Start: 2017-07-26

## 2017-07-26 RX ORDER — SODIUM CHLORIDE, SODIUM LACTATE, POTASSIUM CHLORIDE, CALCIUM CHLORIDE 600; 310; 30; 20 MG/100ML; MG/100ML; MG/100ML; MG/100ML
INJECTION, SOLUTION INTRAVENOUS CONTINUOUS
Status: CANCELLED | OUTPATIENT
Start: 2017-07-26

## 2017-07-26 RX ORDER — PHENYLEPHRINE HCL 2.5 %
1 DROPS OPHTHALMIC (EYE) EVERY 5 MIN PRN
Status: CANCELLED | OUTPATIENT
Start: 2017-07-26

## 2017-07-26 RX ORDER — LORAZEPAM 1 MG/1
1 TABLET ORAL ONCE
Status: CANCELLED | OUTPATIENT
Start: 2017-07-26 | End: 2017-07-26

## 2017-07-26 RX ORDER — KETOROLAC TROMETHAMINE 5 MG/ML
1 SOLUTION OPHTHALMIC EVERY 5 MIN PRN
Status: CANCELLED | OUTPATIENT
Start: 2017-07-26

## 2017-07-26 RX ORDER — TOBRAMYCIN 3 MG/ML
1 SOLUTION/ DROPS OPHTHALMIC EVERY 5 MIN PRN
Status: CANCELLED | OUTPATIENT
Start: 2017-07-26

## 2017-07-29 ASSESSMENT — ENCOUNTER SYMPTOMS
SORE THROAT: 0
EYE PAIN: 0
DOUBLE VISION: 0
BLURRED VISION: 0
NAUSEA: 0
DIARRHEA: 0
ABDOMINAL PAIN: 0
BLOOD IN STOOL: 0
SHORTNESS OF BREATH: 0
EYE DISCHARGE: 0
COUGH: 0
CONSTIPATION: 0
VOMITING: 0
WHEEZING: 0

## 2017-08-01 ENCOUNTER — TELEPHONE (OUTPATIENT)
Dept: INTERNAL MEDICINE | Age: 62
End: 2017-08-01

## 2017-08-01 DIAGNOSIS — E78.5 DYSLIPIDEMIA: Primary | ICD-10-CM

## 2017-08-01 RX ORDER — ATORVASTATIN CALCIUM 10 MG/1
10 TABLET, FILM COATED ORAL DAILY
Qty: 30 TABLET | Refills: 11 | Status: SHIPPED | OUTPATIENT
Start: 2017-08-01 | End: 2019-03-04

## 2017-08-02 ENCOUNTER — HOSPITAL ENCOUNTER (OUTPATIENT)
Age: 62
Setting detail: OUTPATIENT SURGERY
Discharge: HOME | End: 2017-08-02
Attending: OPHTHALMOLOGY | Admitting: OPHTHALMOLOGY
Payer: COMMERCIAL

## 2017-08-02 ENCOUNTER — ANESTHESIA EVENT (OUTPATIENT)
Dept: OPERATING ROOM | Age: 62
End: 2017-08-02
Payer: COMMERCIAL

## 2017-08-02 ENCOUNTER — ANESTHESIA (OUTPATIENT)
Dept: OPERATING ROOM | Age: 62
End: 2017-08-02
Payer: COMMERCIAL

## 2017-08-02 VITALS
HEIGHT: 69 IN | OXYGEN SATURATION: 100 % | DIASTOLIC BLOOD PRESSURE: 79 MMHG | BODY MASS INDEX: 23.25 KG/M2 | RESPIRATION RATE: 16 BRPM | SYSTOLIC BLOOD PRESSURE: 108 MMHG | HEART RATE: 72 BPM | TEMPERATURE: 98.4 F | WEIGHT: 157 LBS

## 2017-08-02 VITALS — OXYGEN SATURATION: 100 % | DIASTOLIC BLOOD PRESSURE: 69 MMHG | SYSTOLIC BLOOD PRESSURE: 132 MMHG

## 2017-08-02 PROBLEM — H25.11 CATARACT, NUCLEAR SCLEROTIC, RIGHT EYE: Status: ACTIVE | Noted: 2017-08-02

## 2017-08-02 PROCEDURE — 7100000011 HC PHASE II RECOVERY - ADDTL 15 MIN: Performed by: OPHTHALMOLOGY

## 2017-08-02 PROCEDURE — 6360000002 HC RX W HCPCS: Performed by: NURSE ANESTHETIST, CERTIFIED REGISTERED

## 2017-08-02 PROCEDURE — 2500000003 HC RX 250 WO HCPCS

## 2017-08-02 PROCEDURE — 3600000012 HC SURGERY LEVEL 2 ADDTL 15MIN: Performed by: OPHTHALMOLOGY

## 2017-08-02 PROCEDURE — 3600000002 HC SURGERY LEVEL 2 BASE: Performed by: OPHTHALMOLOGY

## 2017-08-02 PROCEDURE — 2580000003 HC RX 258: Performed by: OPHTHALMOLOGY

## 2017-08-02 PROCEDURE — 3700000001 HC ADD 15 MINUTES (ANESTHESIA): Performed by: OPHTHALMOLOGY

## 2017-08-02 PROCEDURE — 6370000000 HC RX 637 (ALT 250 FOR IP): Performed by: OPHTHALMOLOGY

## 2017-08-02 PROCEDURE — 2500000003 HC RX 250 WO HCPCS: Performed by: OPHTHALMOLOGY

## 2017-08-02 PROCEDURE — 66984 XCAPSL CTRC RMVL W/O ECP: CPT | Performed by: OPHTHALMOLOGY

## 2017-08-02 PROCEDURE — 00142 ANES PX ON EYE LENS SURGERY: CPT | Performed by: NURSE ANESTHETIST, CERTIFIED REGISTERED

## 2017-08-02 PROCEDURE — 3700000000 HC ANESTHESIA ATTENDED CARE: Performed by: OPHTHALMOLOGY

## 2017-08-02 PROCEDURE — 7100000010 HC PHASE II RECOVERY - FIRST 15 MIN: Performed by: OPHTHALMOLOGY

## 2017-08-02 PROCEDURE — 6360000002 HC RX W HCPCS

## 2017-08-02 PROCEDURE — V2632 POST CHMBR INTRAOCULAR LENS: HCPCS | Performed by: OPHTHALMOLOGY

## 2017-08-02 DEVICE — LENS IOL SN60WF 21.5D: Type: IMPLANTABLE DEVICE | Site: EYE | Status: FUNCTIONAL

## 2017-08-02 RX ORDER — PHENYLEPHRINE HCL 2.5 %
1 DROPS OPHTHALMIC (EYE) EVERY 5 MIN PRN
Status: COMPLETED | OUTPATIENT
Start: 2017-08-02 | End: 2017-08-02

## 2017-08-02 RX ORDER — CYCLOPENTOLATE HYDROCHLORIDE 10 MG/ML
1 SOLUTION/ DROPS OPHTHALMIC EVERY 5 MIN PRN
Status: COMPLETED | OUTPATIENT
Start: 2017-08-02 | End: 2017-08-02

## 2017-08-02 RX ORDER — TOBRAMYCIN 3 MG/ML
1 SOLUTION/ DROPS OPHTHALMIC EVERY 5 MIN PRN
Status: DISCONTINUED | OUTPATIENT
Start: 2017-08-02 | End: 2017-08-02 | Stop reason: HOSPADM

## 2017-08-02 RX ORDER — MIDAZOLAM HYDROCHLORIDE 1 MG/ML
INJECTION INTRAMUSCULAR; INTRAVENOUS PRN
Status: DISCONTINUED | OUTPATIENT
Start: 2017-08-02 | End: 2017-08-02 | Stop reason: SDUPTHER

## 2017-08-02 RX ORDER — LORAZEPAM 1 MG/1
1 TABLET ORAL ONCE
Status: COMPLETED | OUTPATIENT
Start: 2017-08-02 | End: 2017-08-02

## 2017-08-02 RX ORDER — FENTANYL CITRATE 50 UG/ML
INJECTION, SOLUTION INTRAMUSCULAR; INTRAVENOUS PRN
Status: DISCONTINUED | OUTPATIENT
Start: 2017-08-02 | End: 2017-08-02 | Stop reason: SDUPTHER

## 2017-08-02 RX ORDER — BUPIVACAINE HYDROCHLORIDE 5 MG/ML
INJECTION, SOLUTION EPIDURAL; INTRACAUDAL PRN
Status: DISCONTINUED | OUTPATIENT
Start: 2017-08-02 | End: 2017-08-02 | Stop reason: HOSPADM

## 2017-08-02 RX ORDER — BALANCED SALT SOLUTION ENRICHED WITH BICARBONATE, DEXTROSE, AND GLUTATHIONE
KIT INTRAOCULAR PRN
Status: DISCONTINUED | OUTPATIENT
Start: 2017-08-02 | End: 2017-08-02 | Stop reason: HOSPADM

## 2017-08-02 RX ORDER — SODIUM CHLORIDE, SODIUM LACTATE, POTASSIUM CHLORIDE, CALCIUM CHLORIDE 600; 310; 30; 20 MG/100ML; MG/100ML; MG/100ML; MG/100ML
INJECTION, SOLUTION INTRAVENOUS CONTINUOUS
Status: DISCONTINUED | OUTPATIENT
Start: 2017-08-02 | End: 2017-08-02 | Stop reason: HOSPADM

## 2017-08-02 RX ORDER — KETOROLAC TROMETHAMINE 5 MG/ML
1 SOLUTION OPHTHALMIC EVERY 5 MIN PRN
Status: DISCONTINUED | OUTPATIENT
Start: 2017-08-02 | End: 2017-08-02 | Stop reason: HOSPADM

## 2017-08-02 RX ORDER — BUPIVACAINE HYDROCHLORIDE 5 MG/ML
1 INJECTION, SOLUTION EPIDURAL; INTRACAUDAL ONCE
Status: DISCONTINUED | OUTPATIENT
Start: 2017-08-02 | End: 2017-08-02 | Stop reason: HOSPADM

## 2017-08-02 RX ADMIN — FENTANYL CITRATE 50 MCG: 50 INJECTION, SOLUTION INTRAMUSCULAR; INTRAVENOUS at 13:41

## 2017-08-02 RX ADMIN — FENTANYL CITRATE 50 MCG: 50 INJECTION, SOLUTION INTRAMUSCULAR; INTRAVENOUS at 13:39

## 2017-08-02 ASSESSMENT — PAIN SCALES - GENERAL
PAINLEVEL_OUTOF10: 0

## 2017-08-02 ASSESSMENT — PAIN - FUNCTIONAL ASSESSMENT: PAIN_FUNCTIONAL_ASSESSMENT: 0-10

## 2017-08-03 ENCOUNTER — OFFICE VISIT (OUTPATIENT)
Dept: OPTOMETRY | Age: 62
End: 2017-08-03

## 2017-08-03 DIAGNOSIS — H25.13 NUCLEAR SCLEROSIS OF BOTH EYES: Primary | ICD-10-CM

## 2017-08-03 PROCEDURE — 99999 PR OFFICE/OUTPT VISIT,PROCEDURE ONLY: CPT | Performed by: OPTOMETRIST

## 2017-08-03 RX ORDER — PREDNISOLONE ACETATE 10 MG/ML
SUSPENSION/ DROPS OPHTHALMIC
COMMUNITY
Start: 2017-07-07 | End: 2017-11-09 | Stop reason: ALTCHOICE

## 2017-08-03 RX ORDER — TOBRAMYCIN 3 MG/ML
SOLUTION/ DROPS OPHTHALMIC
COMMUNITY
Start: 2017-07-07 | End: 2017-09-06 | Stop reason: ALTCHOICE

## 2017-08-03 RX ORDER — BENOXINATE HCL/FLUORESCEIN SOD 0.4%-0.25%
1 DROPS OPHTHALMIC (EYE) ONCE
Status: COMPLETED | OUTPATIENT
Start: 2017-08-03 | End: 2017-08-03

## 2017-08-03 RX ADMIN — Medication 1 DROP: at 13:58

## 2017-08-03 ASSESSMENT — VISUAL ACUITY
OS_CC: 20/30
METHOD: SNELLEN - LINEAR
OD_SC+: +1
OD_SC: 20/70
CORRECTION_TYPE: GLASSES

## 2017-08-03 ASSESSMENT — SLIT LAMP EXAM - LIDS: COMMENTS: NORMAL

## 2017-08-03 ASSESSMENT — TONOMETRY
IOP_METHOD: APPLANATION W FLURESS DROP
OD_IOP_MMHG: 17

## 2017-08-08 ENCOUNTER — OFFICE VISIT (OUTPATIENT)
Dept: PAIN MANAGEMENT | Age: 62
End: 2017-08-08
Payer: COMMERCIAL

## 2017-08-08 VITALS
RESPIRATION RATE: 14 BRPM | HEART RATE: 64 BPM | WEIGHT: 155 LBS | HEIGHT: 69 IN | DIASTOLIC BLOOD PRESSURE: 60 MMHG | SYSTOLIC BLOOD PRESSURE: 104 MMHG | BODY MASS INDEX: 22.96 KG/M2

## 2017-08-08 DIAGNOSIS — G89.29 ENCOUNTER FOR CHRONIC PAIN MANAGEMENT: Primary | ICD-10-CM

## 2017-08-08 DIAGNOSIS — G62.9 PERIPHERAL POLYNEUROPATHY: ICD-10-CM

## 2017-08-08 DIAGNOSIS — G89.29 CHRONIC ABDOMINAL PAIN: ICD-10-CM

## 2017-08-08 DIAGNOSIS — R10.9 CHRONIC ABDOMINAL PAIN: ICD-10-CM

## 2017-08-08 PROCEDURE — 99214 OFFICE O/P EST MOD 30 MIN: CPT | Performed by: NURSE PRACTITIONER

## 2017-08-08 RX ORDER — FENTANYL 37.5 UG/H
1 PATCH, EXTENDED RELEASE TRANSDERMAL
Qty: 10 PATCH | Refills: 0 | Status: SHIPPED | OUTPATIENT
Start: 2017-08-24 | End: 2017-08-08

## 2017-08-08 RX ORDER — FENTANYL 25 UG/H
1 PATCH TRANSDERMAL
Qty: 10 PATCH | Refills: 0 | Status: SHIPPED | OUTPATIENT
Start: 2017-08-08 | End: 2017-09-06 | Stop reason: SDUPTHER

## 2017-08-09 PROBLEM — G89.29 ENCOUNTER FOR CHRONIC PAIN MANAGEMENT: Status: ACTIVE | Noted: 2017-08-09

## 2017-08-09 ASSESSMENT — ENCOUNTER SYMPTOMS
NAUSEA: 1
VOMITING: 0
DIARRHEA: 1
ABDOMINAL PAIN: 1
CONSTIPATION: 0

## 2017-08-10 ENCOUNTER — OFFICE VISIT (OUTPATIENT)
Dept: OPTOMETRY | Age: 62
End: 2017-08-10

## 2017-08-10 DIAGNOSIS — H25.13 NUCLEAR SCLEROSIS OF BOTH EYES: Primary | ICD-10-CM

## 2017-08-10 PROCEDURE — 99999 PR OFFICE/OUTPT VISIT,PROCEDURE ONLY: CPT | Performed by: OPTOMETRIST

## 2017-08-10 ASSESSMENT — REFRACTION_MANIFEST
OD_SPHERE: +0.50
OD_CYLINDER: -0.50
OD_AXIS: 170

## 2017-08-10 ASSESSMENT — VISUAL ACUITY
OS_SC: 20/40
OD_SC: 20/25
OD_SC+: -2
METHOD: SNELLEN - LINEAR

## 2017-08-10 ASSESSMENT — KERATOMETRY
OS_K2POWER_DIOPTERS: 44.00
OS_AXISANGLE2_DEGREES: 015
OD_AXISANGLE2_DEGREES: 176
OD_K1POWER_DIOPTERS: 43.00
OS_K1POWER_DIOPTERS: 43.50
OS_AXISANGLE_DEGREES: 105
OD_K2POWER_DIOPTERS: 44.25
OD_AXISANGLE_DEGREES: 086

## 2017-08-10 ASSESSMENT — SLIT LAMP EXAM - LIDS: COMMENTS: NORMAL

## 2017-08-11 ENCOUNTER — PROCEDURE VISIT (OUTPATIENT)
Dept: CARDIOLOGY | Age: 62
End: 2017-08-11
Payer: COMMERCIAL

## 2017-08-11 DIAGNOSIS — I34.0 MITRAL VALVE INSUFFICIENCY, UNSPECIFIED ETIOLOGY: Primary | ICD-10-CM

## 2017-08-11 DIAGNOSIS — I34.0 MITRAL VALVE INSUFFICIENCY, UNSPECIFIED ETIOLOGY: ICD-10-CM

## 2017-08-11 LAB
LEFT VENTRICULAR EJECTION FRACTION MODE: NORMAL
LV EF: 55 %

## 2017-08-11 PROCEDURE — 93306 TTE W/DOPPLER COMPLETE: CPT | Performed by: INTERNAL MEDICINE

## 2017-08-15 ENCOUNTER — HOSPITAL ENCOUNTER (OUTPATIENT)
Dept: BONE DENSITY | Age: 62
Discharge: HOME | End: 2017-08-15
Payer: COMMERCIAL

## 2017-08-15 DIAGNOSIS — M81.0 OSTEOPOROSIS WITHOUT CURRENT PATHOLOGICAL FRACTURE, UNSPECIFIED OSTEOPOROSIS TYPE: ICD-10-CM

## 2017-08-15 PROCEDURE — 77080 DXA BONE DENSITY AXIAL: CPT

## 2017-08-30 ENCOUNTER — ANESTHESIA (OUTPATIENT)
Dept: OPERATING ROOM | Age: 62
End: 2017-08-30
Payer: COMMERCIAL

## 2017-08-30 ENCOUNTER — HOSPITAL ENCOUNTER (OUTPATIENT)
Age: 62
Setting detail: OUTPATIENT SURGERY
Discharge: HOME | End: 2017-08-30
Attending: OPHTHALMOLOGY | Admitting: OPHTHALMOLOGY
Payer: COMMERCIAL

## 2017-08-30 ENCOUNTER — ANESTHESIA EVENT (OUTPATIENT)
Dept: OPERATING ROOM | Age: 62
End: 2017-08-30
Payer: COMMERCIAL

## 2017-08-30 VITALS
TEMPERATURE: 99 F | SYSTOLIC BLOOD PRESSURE: 118 MMHG | HEIGHT: 69 IN | HEART RATE: 71 BPM | DIASTOLIC BLOOD PRESSURE: 63 MMHG | OXYGEN SATURATION: 99 % | RESPIRATION RATE: 14 BRPM | BODY MASS INDEX: 22.66 KG/M2 | WEIGHT: 153 LBS

## 2017-08-30 VITALS — DIASTOLIC BLOOD PRESSURE: 71 MMHG | SYSTOLIC BLOOD PRESSURE: 125 MMHG | OXYGEN SATURATION: 100 %

## 2017-08-30 PROBLEM — H25.12 CATARACT, NUCLEAR SCLEROTIC, LEFT EYE: Status: RESOLVED | Noted: 2017-08-30 | Resolved: 2017-08-30

## 2017-08-30 LAB — GLUCOSE BLD-MCNC: 108 MG/DL (ref 65–105)

## 2017-08-30 PROCEDURE — 3700000001 HC ADD 15 MINUTES (ANESTHESIA): Performed by: OPHTHALMOLOGY

## 2017-08-30 PROCEDURE — 2500000003 HC RX 250 WO HCPCS

## 2017-08-30 PROCEDURE — 3700000000 HC ANESTHESIA ATTENDED CARE: Performed by: OPHTHALMOLOGY

## 2017-08-30 PROCEDURE — V2632 POST CHMBR INTRAOCULAR LENS: HCPCS | Performed by: OPHTHALMOLOGY

## 2017-08-30 PROCEDURE — 6370000000 HC RX 637 (ALT 250 FOR IP): Performed by: OPHTHALMOLOGY

## 2017-08-30 PROCEDURE — 92136 OPHTHALMIC BIOMETRY: CPT | Performed by: OPHTHALMOLOGY

## 2017-08-30 PROCEDURE — 6360000002 HC RX W HCPCS

## 2017-08-30 PROCEDURE — 00142 ANES PX ON EYE LENS SURGERY: CPT | Performed by: NURSE ANESTHETIST, CERTIFIED REGISTERED

## 2017-08-30 PROCEDURE — 3600000002 HC SURGERY LEVEL 2 BASE: Performed by: OPHTHALMOLOGY

## 2017-08-30 PROCEDURE — 66984 XCAPSL CTRC RMVL W/O ECP: CPT | Performed by: OPHTHALMOLOGY

## 2017-08-30 PROCEDURE — 82947 ASSAY GLUCOSE BLOOD QUANT: CPT

## 2017-08-30 PROCEDURE — 2500000003 HC RX 250 WO HCPCS: Performed by: OPHTHALMOLOGY

## 2017-08-30 PROCEDURE — 7100000010 HC PHASE II RECOVERY - FIRST 15 MIN: Performed by: OPHTHALMOLOGY

## 2017-08-30 PROCEDURE — 6360000002 HC RX W HCPCS: Performed by: NURSE ANESTHETIST, CERTIFIED REGISTERED

## 2017-08-30 PROCEDURE — 3600000012 HC SURGERY LEVEL 2 ADDTL 15MIN: Performed by: OPHTHALMOLOGY

## 2017-08-30 DEVICE — LENS IOL SN60WF 21.5D: Type: IMPLANTABLE DEVICE | Site: EYE | Status: FUNCTIONAL

## 2017-08-30 RX ORDER — KETOROLAC TROMETHAMINE 5 MG/ML
1 SOLUTION OPHTHALMIC EVERY 5 MIN PRN
Status: COMPLETED | OUTPATIENT
Start: 2017-08-30 | End: 2017-08-30

## 2017-08-30 RX ORDER — PHENYLEPHRINE HCL 2.5 %
1 DROPS OPHTHALMIC (EYE) EVERY 5 MIN PRN
Status: COMPLETED | OUTPATIENT
Start: 2017-08-30 | End: 2017-08-30

## 2017-08-30 RX ORDER — TOBRAMYCIN 3 MG/ML
1 SOLUTION/ DROPS OPHTHALMIC EVERY 5 MIN PRN
Status: DISCONTINUED | OUTPATIENT
Start: 2017-08-30 | End: 2017-08-30 | Stop reason: HOSPADM

## 2017-08-30 RX ORDER — BUPIVACAINE HYDROCHLORIDE 5 MG/ML
INJECTION, SOLUTION EPIDURAL; INTRACAUDAL PRN
Status: DISCONTINUED | OUTPATIENT
Start: 2017-08-30 | End: 2017-08-30 | Stop reason: HOSPADM

## 2017-08-30 RX ORDER — BUPIVACAINE HYDROCHLORIDE 5 MG/ML
1 INJECTION, SOLUTION EPIDURAL; INTRACAUDAL ONCE
Status: DISCONTINUED | OUTPATIENT
Start: 2017-08-30 | End: 2017-08-30 | Stop reason: HOSPADM

## 2017-08-30 RX ORDER — MIDAZOLAM HYDROCHLORIDE 1 MG/ML
INJECTION INTRAMUSCULAR; INTRAVENOUS PRN
Status: DISCONTINUED | OUTPATIENT
Start: 2017-08-30 | End: 2017-08-30 | Stop reason: SDUPTHER

## 2017-08-30 RX ORDER — SODIUM CHLORIDE, SODIUM LACTATE, POTASSIUM CHLORIDE, CALCIUM CHLORIDE 600; 310; 30; 20 MG/100ML; MG/100ML; MG/100ML; MG/100ML
INJECTION, SOLUTION INTRAVENOUS CONTINUOUS
Status: DISCONTINUED | OUTPATIENT
Start: 2017-08-30 | End: 2017-08-30 | Stop reason: HOSPADM

## 2017-08-30 RX ORDER — CYCLOPENTOLATE HYDROCHLORIDE 10 MG/ML
1 SOLUTION/ DROPS OPHTHALMIC EVERY 5 MIN PRN
Status: COMPLETED | OUTPATIENT
Start: 2017-08-30 | End: 2017-08-30

## 2017-08-30 RX ORDER — NEOMYCIN SULFATE, POLYMYXIN B SULFATE, AND DEXAMETHASONE 3.5; 10000; 1 MG/G; [USP'U]/G; MG/G
OINTMENT OPHTHALMIC PRN
Status: DISCONTINUED | OUTPATIENT
Start: 2017-08-30 | End: 2017-08-30 | Stop reason: HOSPADM

## 2017-08-30 RX ADMIN — KETOROLAC TROMETHAMINE 1 DROP: 5 SOLUTION/ DROPS OPHTHALMIC at 12:22

## 2017-08-30 RX ADMIN — PHENYLEPHRINE HYDROCHLORIDE 1 DROP: 25 SOLUTION/ DROPS OPHTHALMIC at 12:27

## 2017-08-30 RX ADMIN — TOBRAMYCIN 1 DROP: 3 SOLUTION OPHTHALMIC at 12:27

## 2017-08-30 RX ADMIN — KETOROLAC TROMETHAMINE 1 DROP: 5 SOLUTION/ DROPS OPHTHALMIC at 12:27

## 2017-08-30 RX ADMIN — MIDAZOLAM 2 MG: 1 INJECTION INTRAMUSCULAR; INTRAVENOUS at 14:04

## 2017-08-30 RX ADMIN — CYCLOPENTOLATE HYDROCHLORIDE 1 DROP: 10 SOLUTION/ DROPS OPHTHALMIC at 12:27

## 2017-08-30 RX ADMIN — CYCLOPENTOLATE HYDROCHLORIDE 1 DROP: 10 SOLUTION/ DROPS OPHTHALMIC at 12:22

## 2017-08-30 RX ADMIN — PHENYLEPHRINE HYDROCHLORIDE 1 DROP: 25 SOLUTION/ DROPS OPHTHALMIC at 12:22

## 2017-08-30 RX ADMIN — TOBRAMYCIN 1 DROP: 3 SOLUTION OPHTHALMIC at 12:22

## 2017-08-30 ASSESSMENT — PAIN - FUNCTIONAL ASSESSMENT: PAIN_FUNCTIONAL_ASSESSMENT: 0-10

## 2017-08-30 ASSESSMENT — PAIN SCALES - GENERAL
PAINLEVEL_OUTOF10: 0
PAINLEVEL_OUTOF10: 0

## 2017-08-31 ENCOUNTER — OFFICE VISIT (OUTPATIENT)
Dept: OPTOMETRY | Age: 62
End: 2017-08-31

## 2017-08-31 DIAGNOSIS — H25.13 NUCLEAR AGE-RELATED CATARACT, BOTH EYES: Primary | ICD-10-CM

## 2017-08-31 PROCEDURE — 99999 PR OFFICE/OUTPT VISIT,PROCEDURE ONLY: CPT | Performed by: OPTOMETRIST

## 2017-08-31 RX ORDER — BENOXINATE HCL/FLUORESCEIN SOD 0.4%-0.25%
1 DROPS OPHTHALMIC (EYE) ONCE
Status: COMPLETED | OUTPATIENT
Start: 2017-08-31 | End: 2017-08-31

## 2017-08-31 RX ADMIN — Medication 1 DROP: at 14:44

## 2017-08-31 ASSESSMENT — KERATOMETRY
OS_K2POWER_DIOPTERS: 44.75
OD_AXISANGLE_DEGREES: 087
OS_AXISANGLE_DEGREES: 100
OS_AXISANGLE2_DEGREES: 010
OD_K1POWER_DIOPTERS: 43.00
OD_AXISANGLE2_DEGREES: 177
OD_K2POWER_DIOPTERS: 44.00
OS_K1POWER_DIOPTERS: 43.50

## 2017-08-31 ASSESSMENT — REFRACTION_MANIFEST
OS_SPHERE: +1.25
OD_SPHERE: +0.50
OS_AXIS: 030
OD_CYLINDER: -0.75
OS_CYLINDER: -1.00
OD_AXIS: 170

## 2017-08-31 ASSESSMENT — TONOMETRY
OD_IOP_MMHG: 14
OS_IOP_MMHG: 15
IOP_METHOD: APPLANATION W FLURESS DROP

## 2017-08-31 ASSESSMENT — REFRACTION_WEARINGRX
SPECS_TYPE: NOT FINAL
OD_AXIS: 170
OD_CYLINDER: -0.50
OD_SPHERE: +0.50

## 2017-08-31 ASSESSMENT — SLIT LAMP EXAM - LIDS
COMMENTS: NORMAL
COMMENTS: NORMAL

## 2017-08-31 ASSESSMENT — VISUAL ACUITY
OD_SC: 20/20
METHOD: SNELLEN - LINEAR
OS_SC: 20/40

## 2017-09-06 ENCOUNTER — OFFICE VISIT (OUTPATIENT)
Dept: OPTOMETRY | Age: 62
End: 2017-09-06

## 2017-09-06 DIAGNOSIS — H25.13 NUCLEAR AGE-RELATED CATARACT, BOTH EYES: Primary | ICD-10-CM

## 2017-09-06 PROCEDURE — 99999 PR OFFICE/OUTPT VISIT,PROCEDURE ONLY: CPT | Performed by: OPTOMETRIST

## 2017-09-06 RX ORDER — FENTANYL 25 UG/H
1 PATCH TRANSDERMAL
Qty: 10 PATCH | Refills: 0 | Status: SHIPPED | OUTPATIENT
Start: 2017-09-07 | End: 2017-10-17 | Stop reason: SDUPTHER

## 2017-09-06 RX ORDER — BENOXINATE HCL/FLUORESCEIN SOD 0.4%-0.25%
1 DROPS OPHTHALMIC (EYE) ONCE
Status: COMPLETED | OUTPATIENT
Start: 2017-09-06 | End: 2017-09-06

## 2017-09-06 RX ADMIN — Medication 1 DROP: at 13:57

## 2017-09-06 ASSESSMENT — KERATOMETRY
OS_K1POWER_DIOPTERS: 43.50
OS_K2POWER_DIOPTERS: 44.50
OD_K2POWER_DIOPTERS: 44.00
OD_AXISANGLE_DEGREES: 090
OD_AXISANGLE2_DEGREES: 180
OS_AXISANGLE2_DEGREES: 015
OS_AXISANGLE_DEGREES: 105
OD_K1POWER_DIOPTERS: 43.00

## 2017-09-06 ASSESSMENT — REFRACTION_MANIFEST
OD_SPHERE: +0.50
OS_AXIS: 030
OD_CYLINDER: -0.75
OD_AXIS: 170
OS_SPHERE: +1.00
OS_CYLINDER: -0.50

## 2017-09-06 ASSESSMENT — REFRACTION_WEARINGRX
OD_AXIS: 170
OD_SPHERE: +0.50
OS_AXIS: 030
OD_CYLINDER: -0.75
SPECS_TYPE: NOT FINAL
OS_CYLINDER: -1.00
OS_SPHERE: +1.25

## 2017-09-06 ASSESSMENT — VISUAL ACUITY
METHOD: SNELLEN - LINEAR
OS_SC+: +2
OS_SC: 20/30
OD_SC: 20/20

## 2017-09-06 ASSESSMENT — TONOMETRY
IOP_METHOD: APPLANATION W FLURESS DROP
OD_IOP_MMHG: 15
OS_IOP_MMHG: 15

## 2017-09-06 ASSESSMENT — SLIT LAMP EXAM - LIDS
COMMENTS: NORMAL
COMMENTS: NORMAL

## 2017-09-07 ENCOUNTER — TELEPHONE (OUTPATIENT)
Dept: PAIN MANAGEMENT | Age: 62
End: 2017-09-07

## 2017-09-27 ENCOUNTER — OFFICE VISIT (OUTPATIENT)
Dept: OPTOMETRY | Age: 62
End: 2017-09-27

## 2017-09-27 DIAGNOSIS — H25.13 NUCLEAR SCLEROSIS OF BOTH EYES: Primary | ICD-10-CM

## 2017-09-27 PROCEDURE — 99999 PR OFFICE/OUTPT VISIT,PROCEDURE ONLY: CPT | Performed by: OPTOMETRIST

## 2017-09-27 RX ORDER — BENOXINATE HCL/FLUORESCEIN SOD 0.4%-0.25%
1 DROPS OPHTHALMIC (EYE) ONCE
Status: COMPLETED | OUTPATIENT
Start: 2017-09-27 | End: 2017-09-27

## 2017-09-27 RX ORDER — CLONAZEPAM 1 MG/1
TABLET ORAL
Qty: 90 TABLET | Refills: 0 | Status: SHIPPED | OUTPATIENT
Start: 2017-09-27 | End: 2017-11-16 | Stop reason: SDUPTHER

## 2017-09-27 RX ADMIN — Medication 1 DROP: at 13:54

## 2017-09-27 ASSESSMENT — KERATOMETRY
OD_K2POWER_DIOPTERS: 43.50
OS_AXISANGLE2_DEGREES: 024
OD_AXISANGLE_DEGREES: 077
OD_AXISANGLE2_DEGREES: 167
OS_K2POWER_DIOPTERS: 44.00
OS_AXISANGLE_DEGREES: 114
OD_K1POWER_DIOPTERS: 42.75
OS_K1POWER_DIOPTERS: 43.50

## 2017-09-27 ASSESSMENT — REFRACTION_MANIFEST
OS_CYLINDER: -0.25
OS_SPHERE: +0.25
OS_AXIS: 045
OD_SPHERE: PLANO
OS_ADD: +2.50
OD_ADD: +2.50

## 2017-09-27 ASSESSMENT — SLIT LAMP EXAM - LIDS
COMMENTS: NORMAL
COMMENTS: NORMAL

## 2017-09-27 ASSESSMENT — REFRACTION_WEARINGRX
OD_SPHERE: +0.50
OD_CYLINDER: -0.75
OS_AXIS: 030
OS_SPHERE: +1.00
SPECS_TYPE: NOT FINAL
OD_AXIS: 170
OS_CYLINDER: -0.50

## 2017-09-27 ASSESSMENT — VISUAL ACUITY
OD_SC: 20/20
METHOD: SNELLEN - LINEAR
OS_SC: 20/20
OD_SC: 20/100 OU

## 2017-09-27 ASSESSMENT — TONOMETRY
OS_IOP_MMHG: 13
IOP_METHOD: APPLANATION W FLURESS DROP
OD_IOP_MMHG: 13

## 2017-10-03 RX ORDER — CLONAZEPAM 1 MG/1
TABLET ORAL
Qty: 90 TABLET | Refills: 0 | OUTPATIENT
Start: 2017-10-03

## 2017-10-03 NOTE — TELEPHONE ENCOUNTER
Rx phoned in on 9/27/17 to [facility]. Verified with [facility] - they did receive the rx.

## 2017-10-17 ENCOUNTER — TELEPHONE (OUTPATIENT)
Dept: PAIN MANAGEMENT | Age: 62
End: 2017-10-17

## 2017-10-17 NOTE — TELEPHONE ENCOUNTER
Patient requesting new Rx for Fentanyl Patches 25 mcg/hr change Q72 hrs.    Last ov 8/8/2017  Next ov 10/31/2017  Patient fills prescriptions at . [address]  [phone]

## 2017-10-18 RX ORDER — FENTANYL 25 UG/H
1 PATCH TRANSDERMAL
Qty: 10 PATCH | Refills: 0 | Status: SHIPPED | OUTPATIENT
Start: 2017-10-18 | End: 2017-11-09

## 2017-10-26 DIAGNOSIS — H25.012 CORTICAL AGE-RELATED CATARACT OF LEFT EYE: ICD-10-CM

## 2017-11-03 RX ORDER — SULFAMETHOXAZOLE AND TRIMETHOPRIM 800; 160 MG/1; MG/1
TABLET ORAL
Qty: 30 TABLET | Refills: 3 | Status: SHIPPED | OUTPATIENT
Start: 2017-11-03 | End: 2018-01-29 | Stop reason: SDUPTHER

## 2017-11-09 ENCOUNTER — OFFICE VISIT (OUTPATIENT)
Dept: PAIN MANAGEMENT | Age: 62
End: 2017-11-09
Payer: COMMERCIAL

## 2017-11-09 ENCOUNTER — HOSPITAL ENCOUNTER (OUTPATIENT)
Age: 62
Setting detail: SPECIMEN
Discharge: HOME | End: 2017-11-09
Payer: COMMERCIAL

## 2017-11-09 VITALS
HEIGHT: 69 IN | HEART RATE: 68 BPM | WEIGHT: 155.6 LBS | SYSTOLIC BLOOD PRESSURE: 96 MMHG | DIASTOLIC BLOOD PRESSURE: 64 MMHG | BODY MASS INDEX: 23.05 KG/M2

## 2017-11-09 DIAGNOSIS — Z02.83 ENCOUNTER FOR DRUG SCREENING: ICD-10-CM

## 2017-11-09 DIAGNOSIS — Z79.891 ENCOUNTER FOR LONG-TERM OPIATE ANALGESIC USE: ICD-10-CM

## 2017-11-09 DIAGNOSIS — R10.9 CHRONIC ABDOMINAL PAIN: Primary | ICD-10-CM

## 2017-11-09 DIAGNOSIS — G89.29 CHRONIC ABDOMINAL PAIN: Primary | ICD-10-CM

## 2017-11-09 PROCEDURE — 80307 DRUG TEST PRSMV CHEM ANLYZR: CPT

## 2017-11-09 PROCEDURE — 99214 OFFICE O/P EST MOD 30 MIN: CPT | Performed by: NURSE PRACTITIONER

## 2017-11-09 RX ORDER — FENTANYL 12 UG/H
1 PATCH TRANSDERMAL
Qty: 10 PATCH | Refills: 0 | Status: SHIPPED | OUTPATIENT
Start: 2017-11-09 | End: 2017-12-09

## 2017-11-09 RX ORDER — HYDROMORPHONE HYDROCHLORIDE 2 MG/1
2 TABLET ORAL 3 TIMES DAILY PRN
Qty: 90 TABLET | Refills: 0 | Status: SHIPPED | OUTPATIENT
Start: 2017-11-09 | End: 2019-05-14

## 2017-11-09 ASSESSMENT — ENCOUNTER SYMPTOMS
VOMITING: 0
ABDOMINAL PAIN: 1
DIARRHEA: 1
NAUSEA: 1
CONSTIPATION: 0

## 2017-11-09 NOTE — PATIENT INSTRUCTIONS
Pain Management Plan:  1. Reduce fentanyl next fill 12mcg  2. Continue Dilaudid up to 3 per day    Pain clinic phone numbers  Refills  - Call [phone]. Please leave your name, a working phone number, date of birth, the name, dose, quantity, and last refill date of the prescription, and the pharmacy. Medication or Procedure Questions - Call [phone]. This is the direct line to the [facility] Pain Management Nurses. Appointment or General Questions - Call  [phone]. This is the direct line the  [address] [place] [address] can also use [employer]. Is your MyChart Activated? How to dispose of unused pain medications safely:  · Flush all unused pain medications. This is the FDA's recommended way of disposing these medications. · All other medications can be disposed in the trash mixed in undesirable contents (used coffee grounds, used [employer], etc).

## 2017-11-09 NOTE — PROGRESS NOTES
Subjective:      Patient ID: Quincy Sandoval is a 63 y.o. female. Chief Complaint   Patient presents with    Abdominal Pain     3 month follow up     HPI Here today for routine pain clinic recheck. Abdominal pain flare, outpatient [facility], colonoscopy and endoscopy. States diagnosed with pouchitis. Dilaudid prn with relief. Would like to continue Fentanyl wean    Pain Assessment  Location of Pain:  (abdomen)  Severity of Pain: 6  Quality of Pain: Aching  Duration of Pain: A few minutes  Frequency of Pain: Intermittent  Aggravating Factors: Walking  Limiting Behavior: Some  Relieving Factors: Rest, Heat (pain medication)  Are there other pain locations you wish to document?: No    Allergies   Allergen Reactions    Codeine Shortness Of Breath    Tylenol With Codeine #3 [Acetaminophen-Codeine] Shortness Of Breath    Vicodin [Hydrocodone-Acetaminophen] Shortness Of Breath     Makes her feel crazy    Ace Inhibitors Other (See Comments)     Cough    Aspirin      Makes her heart race      Azulfidine [Sulfasalazine] Hives    [place]-D [Diphenhydramine] Other (See Comments)    Elmiron [Pentosan Polysulfate Sodium] Other (See Comments)     bruising    Fentanyl      Injection only    Medrol [Methylprednisolone]      Tolerates prednisone okay    Makes her stomach hurt. She also stated that Prednisone makes her sick.  Morphine Hives    Other      Mycins (not to have per liver transplant team)    Reglan [Metoclopramide]      Medical induced parkinsons    Tramadol      Makes her feel crazy    Phenergan [Promethazine]      Restless leg       Outpatient Prescriptions Marked as Taking for the 11/9/17 encounter (Office Visit) with Namita Tenorio NP   Medication Sig Dispense Refill    HYDROmorphone (DILAUDID) 2 MG tablet Take 1 tablet by mouth 3 times daily as needed for Pain . 90 tablet 0    fentaNYL (DURAGESIC) 12 MCG/HR Place 1 patch onto the skin every 72 hours .  10 patch 0    sulfamethoxazole-trimethoprim (BACTRIM DS) 800-160 MG per tablet Take 1/2 tablet by mouth 5 days a week (Mon through Fri). 30 tablet 3    clonazePAM (KLONOPIN) 1 MG tablet TAKE 1 TABLET BY MOUTH THREE TIMES DAILY AS NEEDED 90 tablet 0    atorvastatin (LIPITOR) 10 MG tablet Take 1 tablet by mouth daily 30 tablet 11    sertraline (ZOLOFT) 50 MG tablet Take 1 tablet by mouth daily Take along with zoloft 100 mg daily (total 150 mg daily) 90 tablet 3    sertraline (ZOLOFT) 100 MG tablet TAKE 1 TABLET DAILY ALONG WITH A 50 MG TABLET (TOTAL  MG DAILY) 90 tablet 3    levothyroxine (SYNTHROID) 125 MCG tablet take 1 tablet by mouth once daily 30 tablet 11    ondansetron (ZOFRAN ODT) 4 MG disintegrating tablet Take 1 tablet by mouth every 8 hours as needed for Nausea 20 tablet 0    omeprazole (PRILOSEC) 20 MG delayed release capsule TAKE 1 CAPSULE TWICE A  capsule 3    famotidine (PEPCID) 40 MG tablet TAKE 1 TABLET NIGHTLY AS NEEDED 90 tablet 3    gabapentin (NEURONTIN) 800 MG tablet TAKE 1 TABLET THREE TIMES A  tablet 3    raloxifene (EVISTA) 60 MG tablet TAKE 1 TABLET DAILY 90 tablet 3    glucose blood VI test strips (ASCENSIA AUTODISC VI;ONE TOUCH ULTRA TEST VI) strip One touch ultra test strips. Test up to 4 times a day. 100 each 11    calcium carbonate (OSCAL) 500 MG TABS tablet Take 500 mg by mouth 2 times daily      insulin lispro (HUMALOG KWIKPEN) 100 UNIT/ML pen Inject  into the skin. 2 units at lunch and evening meals. Plus sliding scale: if BS is greater than 200 take 1 unit, if greater than 225 take 2 units, if greater than 250 take 3 units, if greater than 300 take 5 units, if greater than 350 take 7 units. Report and BS lower than 70.  insulin glargine (LANTUS SOLOSTAR) 100 UNIT/ML injection pen Inject 5 Units into the skin nightly.  With needles (Patient taking differently:   Inject 5 Units into the skin daily as needed With needles) 1 Pen 11    PROGRAF 1 MG capsule Take 1 MR normal ejection fraction September 2014. Ablation performed December 2014.  Hypertension     Hypothyroidism     Interstitial cystitis     Dr. Amadou Higgins    Iron deficiency anemia     Leukocytoclastic vasculitis ([address]     possible    Liver transplanted [facility])     [facility] following. Liver Bx 9/13    MGUS (monoclonal gammopathy of unknown significance)     Trace, Dr. Lani Cooper  -LONNIE nl 2009    Migraine headache     evaluation, Dr Rolanda Topete, [facility] Mitral valve regurgitation     mod on echo 9/14, & 4/16, stable 8/17    Osteoporosis     1.) DEXA, 03/05, T -1.1 spine, T -3.1 hip 2.) DEXA, 12/07, T -0.8 spine, T -1.9 spine 3.) DEXA, 03/10, T -0.2 spine, T -1.7 hip  4) DEXA 7/15 FRAX 6.7% 10 yr risk hip. [facility] prefers no Prolia due to infection risk, and GFR 30    Peripheral neuropathy ([address]     emg 2014- severe    Pouchitis ([address] 2006    Versus small bowel bacterial overgrowth, Dr. Denia Childs specialist [facility]. 1.1) [facility] 8/07 for 12 days with dx being pouchitis. 1.2) panniculitis, 8/07, [facility]. 1.3) Repeat scope, Dr. Francesca Naik, done on pouch 6/11 being okay. Repeat pouchoscopy 9/13& 4/16 [facility]    Primary sclerosing cholangitis     1.1) Following with liver transplant team at [facility] and with Dr. French Andres, gastroenterologist at [facility]. 1.2) Multiple stent replacements in the biliary tract for this about every 6 months. 1.3) Liver transplant, 11/04. Dr Armando Reynolds, Dr Joby Dawson, Transplant coordinator for labs etc. 1.4) Liver biopsy, 6/06.  SVT (supraventricular tachycardia) ([address]     ablated [facility] 2004    Ulcerative colitis ([address]     Status post colectomy. 1.1) Colonoscopy 1/04, Dr. Mariama Marin, with evidence of marked chronic inflammatory changes. 1.2) Repeat pouchoscopy/colonoscopy, 1/4/06, Dr. Mariama Marin, with evidence of pouchitis. 1.3) Repeat colonoscopy, Dr. Angel Ferreira, 6/06.  1.4) Repeat Never Used    Alcohol use No    Drug use: No    Sexual activity: No     Other Topics Concern    None     Social History Narrative    None     Review of Systems   Cardiovascular: Positive for chest pain. Gastrointestinal: Positive for abdominal pain, diarrhea and nausea. Negative for constipation and vomiting. Neurological: Positive for weakness and numbness. Psychiatric/Behavioral: Positive for sleep disturbance. The patient is nervous/anxious (takes Klonopin BID). Objective:   Physical Exam   Constitutional: She is oriented to person, place, and time. Vital signs are normal. She appears well-developed and well-nourished. She is active and cooperative. Non-toxic appearance. She does not have a sickly appearance. She does not appear ill. No distress. HENT:   Head: Normocephalic and atraumatic. Right Ear: External ear normal.   Left Ear: External ear normal.   Cardiovascular: Normal rate. Pulmonary/Chest: Effort normal. No accessory muscle usage. No respiratory distress. No audible wheezing   Neurological: She is alert and oriented to person, place, and time. She is not disoriented. No cranial nerve deficit. She displays no seizure activity. Coordination normal. GCS eye subscore is 4. GCS verbal subscore is 5. GCS motor subscore is 6. Skin: Skin is warm, dry and intact. She is not diaphoretic. No cyanosis. There is pallor. Nails show no clubbing. Psychiatric: She has a normal mood and affect. Her speech is normal. Judgment normal. Her mood appears not anxious. Her affect is not angry. She is not agitated and not aggressive. Vitals reviewed. Assessment:      1. Chronic abdominal pain    2. Encounter for long-term opiate analgesic use    3. Encounter for drug screening          Plan:    Reduce Fentanyl 12mcg  Dilaudid 2mg tid prn  Controlled Substances Monitoring:     Attestation: The Prescription Monitoring Report for this patient was reviewed today.  Mariely Fields,

## 2017-11-10 RX ORDER — RALOXIFENE HYDROCHLORIDE 60 MG/1
TABLET, FILM COATED ORAL
Qty: 90 TABLET | Refills: 3 | Status: SHIPPED | OUTPATIENT
Start: 2017-11-10 | End: 2018-02-08 | Stop reason: SDUPTHER

## 2017-11-13 LAB
6-ACETYLMORPHINE, UR: NOT DETECTED
7-AMINOCLONAZEPAM, URINE: PRESENT
ALPHA-OH-ALPRAZ, URINE: NOT DETECTED
ALPRAZOLAM, URINE: NOT DETECTED
AMPHETAMINES, URINE: NOT DETECTED
BARBITURATES, URINE: NOT DETECTED
BENZOYLECGONINE, UR: NOT DETECTED
BUPRENORPHINE URINE: NOT DETECTED
CARISOPRODOL, UR: NOT DETECTED
CLONAZEPAM, URINE: NOT DETECTED
CODEINE, URINE: NOT DETECTED
CREATININE URINE: 107.2 MG/DL (ref 20–400)
DIAZEPAM, URINE: NOT DETECTED
EER PAIN MGT DRUG PANEL, HIGH RES/EMIT U: NORMAL
ETHYL GLUCURONIDE UR: NOT DETECTED
FENTANYL URINE: PRESENT
HYDROCODONE, URINE: NOT DETECTED
HYDROMORPHONE, URINE: NOT DETECTED
LORAZEPAM, URINE: NOT DETECTED
MARIJUANA METAB, UR: NOT DETECTED
MDA, UR: NOT DETECTED
MDEA, EVE, UR: NOT DETECTED
MDMA URINE: NOT DETECTED
MEPERIDINE METAB, UR: NOT DETECTED
METHADONE, URINE: NOT DETECTED
METHAMPHETAMINE, URINE: NOT DETECTED
METHYLPHENIDATE: NOT DETECTED
MIDAZOLAM, URINE: NOT DETECTED
MORPHINE URINE: NOT DETECTED
NORBUPRENORPHINE, URINE: NOT DETECTED
NORDIAZEPAM, URINE: NOT DETECTED
NORFENTANYL, URINE: PRESENT
NORHYDROCODONE, URINE: NOT DETECTED
NOROXYCODONE, URINE: NOT DETECTED
NOROXYMORPHONE, URINE: NOT DETECTED
OXAZEPAM, URINE: NOT DETECTED
OXYCODONE URINE: NOT DETECTED
OXYMORPHONE, URINE: NOT DETECTED
PAIN MGT DRUG PANEL, HI RES, UR: NORMAL
PCP,URINE: NOT DETECTED
PHENTERMINE, UR: NOT DETECTED
PROPOXYPHENE, URINE: NOT DETECTED
TAPENTADOL, URINE: NOT DETECTED
TAPENTADOL-O-SULFATE, URINE: NOT DETECTED
TEMAZEPAM, URINE: NOT DETECTED
TRAMADOL, URINE: NOT DETECTED
ZOLPIDEM, URINE: NOT DETECTED

## 2017-11-16 RX ORDER — CLONAZEPAM 1 MG/1
TABLET ORAL
Qty: 90 TABLET | Refills: 0 | Status: SHIPPED | OUTPATIENT
Start: 2017-11-16 | End: 2018-01-05 | Stop reason: SDUPTHER

## 2017-11-17 RX ORDER — CLONAZEPAM 1 MG/1
TABLET ORAL
Qty: 90 TABLET | Refills: 0 | OUTPATIENT
Start: 2017-11-17

## 2017-11-27 ENCOUNTER — HOSPITAL ENCOUNTER (OUTPATIENT)
Dept: GENERAL RADIOLOGY | Age: 62
Discharge: HOME | End: 2017-11-27
Payer: COMMERCIAL

## 2017-11-27 ENCOUNTER — OFFICE VISIT (OUTPATIENT)
Dept: INTERNAL MEDICINE | Age: 62
End: 2017-11-27
Payer: COMMERCIAL

## 2017-11-27 VITALS
SYSTOLIC BLOOD PRESSURE: 100 MMHG | BODY MASS INDEX: 23.11 KG/M2 | DIASTOLIC BLOOD PRESSURE: 56 MMHG | WEIGHT: 156 LBS | HEIGHT: 69 IN | HEART RATE: 76 BPM

## 2017-11-27 DIAGNOSIS — Z94.4 LIVER TRANSPLANTED (HCC): Primary | ICD-10-CM

## 2017-11-27 DIAGNOSIS — N18.30 CHRONIC RENAL FAILURE, STAGE 3 (MODERATE) (HCC): ICD-10-CM

## 2017-11-27 DIAGNOSIS — G62.9 PERIPHERAL POLYNEUROPATHY: ICD-10-CM

## 2017-11-27 DIAGNOSIS — I34.0 MITRAL VALVE INSUFFICIENCY, UNSPECIFIED ETIOLOGY: ICD-10-CM

## 2017-11-27 DIAGNOSIS — D69.6 THROMBOCYTOPENIA (HCC): ICD-10-CM

## 2017-11-27 DIAGNOSIS — E55.9 VITAMIN D DEFICIENCY: ICD-10-CM

## 2017-11-27 DIAGNOSIS — R10.9 CHRONIC ABDOMINAL PAIN: ICD-10-CM

## 2017-11-27 DIAGNOSIS — N82.9 FISTULA INVOLVING FEMALE GENITAL TRACT: ICD-10-CM

## 2017-11-27 DIAGNOSIS — G89.29 CHRONIC ABDOMINAL PAIN: ICD-10-CM

## 2017-11-27 DIAGNOSIS — F32.9 MAJOR DEPRESSION, CHRONIC: ICD-10-CM

## 2017-11-27 DIAGNOSIS — I10 ESSENTIAL HYPERTENSION: ICD-10-CM

## 2017-11-27 DIAGNOSIS — E03.8 OTHER SPECIFIED HYPOTHYROIDISM: ICD-10-CM

## 2017-11-27 DIAGNOSIS — I48.91 ATRIAL FIBRILLATION WITH RVR (HCC): ICD-10-CM

## 2017-11-27 DIAGNOSIS — K83.01 PRIMARY SCLEROSING CHOLANGITIS: ICD-10-CM

## 2017-11-27 DIAGNOSIS — E78.5 DYSLIPIDEMIA: ICD-10-CM

## 2017-11-27 DIAGNOSIS — E11.9 CONTROLLED TYPE 2 DIABETES MELLITUS WITHOUT COMPLICATION, WITHOUT LONG-TERM CURRENT USE OF INSULIN (HCC): ICD-10-CM

## 2017-11-27 DIAGNOSIS — M79.675 PAIN OF TOE OF LEFT FOOT: ICD-10-CM

## 2017-11-27 DIAGNOSIS — M81.0 OSTEOPOROSIS WITHOUT CURRENT PATHOLOGICAL FRACTURE, UNSPECIFIED OSTEOPOROSIS TYPE: ICD-10-CM

## 2017-11-27 PROCEDURE — 99214 OFFICE O/P EST MOD 30 MIN: CPT | Performed by: INTERNAL MEDICINE

## 2017-11-27 PROCEDURE — 73630 X-RAY EXAM OF FOOT: CPT

## 2017-11-27 RX ORDER — TINIDAZOLE 250 MG/1
250 TABLET ORAL DAILY
COMMUNITY
Start: 2017-10-26 | End: 2021-06-30

## 2017-11-27 ASSESSMENT — PATIENT HEALTH QUESTIONNAIRE - PHQ9
SUM OF ALL RESPONSES TO PHQ QUESTIONS 1-9: 0
2. FEELING DOWN, DEPRESSED OR HOPELESS: 0
SUM OF ALL RESPONSES TO PHQ9 QUESTIONS 1 & 2: 0
1. LITTLE INTEREST OR PLEASURE IN DOING THINGS: 0

## 2017-11-27 NOTE — PROGRESS NOTES
Kate received counseling on the following healthy behaviors: exercise  Reviewed prior labs and health maintenance  Continue current medications except where noted below, diet and exercise. Discussed use, benefit, and side effects of prescribed medications. Barriers to medication compliance addressed. Patient given educational materials - see patient instructions  Was a self-tracking handout given in paper form or via [employer]? Yes    Requested Prescriptions      No prescriptions requested or ordered in this encounter       All patient questions answered. Patient voiced understanding. Quality Measures    Body mass index is 23.04 kg/m². Normal. Weight control planned discussed: Healthy diet and regular exercise    BP: (!) 100/56 Blood pressure is normal. Treatment plan consists of: see progress note below.       Lab Results   Component Value Date    LDLCALC 61 03/17/2017    LDLCHOLESTEROL 79 03/29/2016    (goal LDL reduction with dx if diabetes is 50% LDL reduction)      PHQ Scores 5/24/2016   PHQ2 Score 2   PHQ9 Score 2     Interpretation of Total Score Depression Severity: 1-4 = Minimal depression, 5-9 = Mild depression, 10-14 = Moderate depression, 15-19 = Moderately severe depression, 20-27 = Severe depression

## 2017-11-27 NOTE — PATIENT INSTRUCTIONS
Patient Education        Heart-Healthy Diet: Care Instructions  Your Care Instructions    A heart-healthy diet has lots of vegetables, fruits, nuts, beans, and whole grains, and is low in salt. It limits foods that are high in saturated fat, such as meats, cheeses, and fried foods. It may be hard to change your diet, but even small changes can lower your risk of heart attack and heart disease. Follow-up care is a key part of your treatment and safety. Be sure to make and go to all appointments, and call your doctor if you are having problems. It's also a good idea to know your test results and keep a list of the medicines you take. How can you care for yourself at home? Watch your portions  · Learn what a serving is. A \"serving\" and a \"portion\" are not always the same thing. Make sure that you are not eating larger portions than are recommended. For example, a serving of pasta is ½ cup. A serving size of meat is 2 to 3 ounces. A 3-ounce serving is about the size of a deck of cards. Measure serving sizes until you are good at [place]" them. Keep in mind that restaurants often serve portions that are 2 or 3 times the size of one serving. · To keep your energy level up and keep you from feeling hungry, eat often but in smaller portions. · Eat only the number of calories you need to stay at a healthy weight. If you need to lose weight, eat fewer calories than your body burns (through exercise and other physical activity). Eat more fruits and vegetables  · Eat a variety of fruit and vegetables every day. Dark green, deep orange, red, or yellow fruits and vegetables are especially good for you. Examples include spinach, carrots, peaches, and berries. · Keep carrots, celery, and other veggies handy for snacks. Buy fruit that is in season and store it where you can see it so that you will be tempted to eat it. · Cook dishes that have a lot of veggies in them, such as stir-fries and soups.   Limit saturated and trans fat  · Read food labels, and try to avoid saturated and trans fats. They increase your risk of heart disease. Trans fat is found in many processed foods such as cookies and crackers. · Use olive or canola oil when you cook. Try cholesterol-lowering spreads, such as Benecol or Take Control. · Bake, broil, grill, or steam foods instead of frying them. · Choose lean meats instead of high-fat meats such as hot dogs and sausages. Cut off all visible fat when you prepare meat. · Eat fish, skinless poultry, and meat alternatives such as soy products instead of high-fat meats. Soy products, such as tofu, may be especially good for your heart. · Choose low-fat or fat-free milk and dairy products. Eat fish  · Eat at least two servings of fish a week. Certain fish, such as salmon and tuna, contain omega-3 fatty acids, which may help reduce your risk of heart attack. Eat foods high in fiber  · Eat a variety of grain products every day. Include whole-grain foods that have lots of fiber and nutrients. Examples of whole-grain foods include oats, whole wheat bread, and brown rice. · Buy whole-grain breads and cereals, instead of white bread or pastries. Limit salt and sodium  · Limit how much salt and sodium you eat to help lower your blood pressure. · Taste food before you salt it. Add only a little salt when you think you need it. With time, your taste buds will adjust to less salt. · Eat fewer snack items, fast foods, and other high-salt, processed foods. Check food labels for the amount of sodium in packaged foods. · Choose low-sodium versions of canned goods (such as soups, vegetables, and beans). Limit sugar  · Limit drinks and foods with added sugar. These include candy, desserts, and soda pop. Limit alcohol  · Limit alcohol to no more than 2 drinks a day for men and 1 drink a day for women. Too much alcohol can cause health problems. When should you call for help?   Watch closely for changes in your
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
## 2017-12-04 ENCOUNTER — TELEPHONE (OUTPATIENT)
Dept: INTERNAL MEDICINE | Age: 62
End: 2017-12-04

## 2017-12-04 DIAGNOSIS — Z12.31 SCREENING MAMMOGRAM, ENCOUNTER FOR: Primary | ICD-10-CM

## 2017-12-13 ENCOUNTER — HOSPITAL ENCOUNTER (OUTPATIENT)
Dept: MAMMOGRAPHY | Age: 62
Discharge: HOME | End: 2017-12-13
Payer: COMMERCIAL

## 2017-12-13 DIAGNOSIS — Z12.31 SCREENING MAMMOGRAM, ENCOUNTER FOR: ICD-10-CM

## 2017-12-13 PROCEDURE — G0202 SCR MAMMO BI INCL CAD: HCPCS

## 2017-12-18 RX ORDER — FAMOTIDINE 40 MG/1
TABLET, FILM COATED ORAL
Qty: 90 TABLET | Refills: 3 | Status: SHIPPED | OUTPATIENT
Start: 2017-12-18 | End: 2018-12-11 | Stop reason: SDUPTHER

## 2017-12-19 ENCOUNTER — OFFICE VISIT (OUTPATIENT)
Dept: OBGYN | Age: 62
End: 2017-12-19
Payer: COMMERCIAL

## 2017-12-19 ENCOUNTER — HOSPITAL ENCOUNTER (OUTPATIENT)
Age: 62
Setting detail: SPECIMEN
Discharge: HOME | End: 2017-12-19
Payer: COMMERCIAL

## 2017-12-19 ENCOUNTER — NURSE ONLY (OUTPATIENT)
Dept: LAB | Age: 62
End: 2017-12-19
Payer: COMMERCIAL

## 2017-12-19 VITALS
RESPIRATION RATE: 16 BRPM | DIASTOLIC BLOOD PRESSURE: 60 MMHG | WEIGHT: 154 LBS | HEART RATE: 80 BPM | HEIGHT: 69 IN | BODY MASS INDEX: 22.81 KG/M2 | SYSTOLIC BLOOD PRESSURE: 102 MMHG

## 2017-12-19 DIAGNOSIS — Z01.419 WELL WOMAN EXAM WITH ROUTINE GYNECOLOGICAL EXAM: Primary | ICD-10-CM

## 2017-12-19 DIAGNOSIS — R10.30 LOWER ABDOMINAL PAIN: Primary | ICD-10-CM

## 2017-12-19 DIAGNOSIS — Z01.419 WELL WOMAN EXAM WITH ROUTINE GYNECOLOGICAL EXAM: ICD-10-CM

## 2017-12-19 DIAGNOSIS — Z23 NEED FOR VACCINATION: Primary | ICD-10-CM

## 2017-12-19 PROCEDURE — 90471 IMMUNIZATION ADMIN: CPT | Performed by: INTERNAL MEDICINE

## 2017-12-19 PROCEDURE — 90715 TDAP VACCINE 7 YRS/> IM: CPT | Performed by: INTERNAL MEDICINE

## 2017-12-19 PROCEDURE — 99203 OFFICE O/P NEW LOW 30 MIN: CPT | Performed by: OBSTETRICS & GYNECOLOGY

## 2017-12-19 PROCEDURE — G0145 SCR C/V CYTO,THINLAYER,RESCR: HCPCS

## 2017-12-19 ASSESSMENT — ENCOUNTER SYMPTOMS
EYES NEGATIVE: 1
RESPIRATORY NEGATIVE: 1

## 2017-12-19 NOTE — PROGRESS NOTES
Subjective:      Patient ID: Wyatt Culp is a 63 y.o. female. HPI  For yearly and pap but c/o generalized pelvic aching to 5/10 over past 2 months. Menopausal since age 50 and denies any bleeding. Has never taken HRT. Hx significant for liver transplant in 2004. Has had total colectomy for ulcerative colitis. Adarsh J-pounch placed and ileostomy reversed. Previously was a diabetic but normal after large weight loss. But does have peripheral neuropathy from same. States had had bladder issues in past (? IC) Takes Bactrim 5 days a week as part of her anti-rejection protocol. Had mammogram last week. Review of Systems   Constitutional: Negative. HENT: Negative. Eyes: Negative. Respiratory: Negative. Cardiovascular: Negative. Gastrointestinal:        Frequent diatrrhea   Genitourinary: \"History of bladder problems\"  Recent pelvic achiness. Musculoskeletal: Negative. Neurological:        Peripheral neuropathy   Hematological: Negative. Psychiatric/Behavioral: Negative. Objective:   Physical Exam   Constitutional: She appears well-developed. HENT:   Head: Normocephalic. Eyes: Pupils are equal, round, and reactive to light. Neck: Neck supple. No thyromegaly present. Cardiovascular: Normal rate, regular rhythm and normal heart sounds. No murmur heard. Pulmonary/Chest: Effort normal. She has no wheezes. She has no rales. Abdominal: Soft. She exhibits no mass. There is no tenderness. Extensive surgical scarring. Genitourinary:   Genitourinary Comments: Normal perineum. Vagina shows significant mucosal  Atrophy  Cannot definitively visualize cervix. Uterus barely palpable in her right upper vagina,  Has diffuse mild tenderness. No masses palpable   Lymphadenopathy:     She has no cervical adenopathy. Assessment:      Pelvic achiness. Hx significant for liver transplant and chronic anti rejection meds.       Plan:      PAP   Pelvis US

## 2017-12-20 ENCOUNTER — HOSPITAL ENCOUNTER (OUTPATIENT)
Dept: ULTRASOUND IMAGING | Age: 62
Discharge: HOME | End: 2017-12-20
Payer: COMMERCIAL

## 2017-12-20 ENCOUNTER — OFFICE VISIT (OUTPATIENT)
Dept: PAIN MANAGEMENT | Age: 62
End: 2017-12-20
Payer: COMMERCIAL

## 2017-12-20 VITALS
SYSTOLIC BLOOD PRESSURE: 124 MMHG | DIASTOLIC BLOOD PRESSURE: 78 MMHG | RESPIRATION RATE: 16 BRPM | HEIGHT: 71 IN | HEART RATE: 68 BPM | WEIGHT: 153 LBS | BODY MASS INDEX: 21.42 KG/M2

## 2017-12-20 DIAGNOSIS — R10.9 CHRONIC ABDOMINAL PAIN: ICD-10-CM

## 2017-12-20 DIAGNOSIS — G89.29 ENCOUNTER FOR CHRONIC PAIN MANAGEMENT: ICD-10-CM

## 2017-12-20 DIAGNOSIS — G89.29 CHRONIC ABDOMINAL PAIN: ICD-10-CM

## 2017-12-20 DIAGNOSIS — R10.30 LOWER ABDOMINAL PAIN: ICD-10-CM

## 2017-12-20 DIAGNOSIS — G62.9 PERIPHERAL POLYNEUROPATHY: Primary | ICD-10-CM

## 2017-12-20 PROCEDURE — 76856 US EXAM PELVIC COMPLETE: CPT

## 2017-12-20 PROCEDURE — 99213 OFFICE O/P EST LOW 20 MIN: CPT | Performed by: NURSE PRACTITIONER

## 2017-12-20 ASSESSMENT — ENCOUNTER SYMPTOMS
ABDOMINAL PAIN: 1
NAUSEA: 1
VOMITING: 0
DIARRHEA: 1
CONSTIPATION: 0

## 2017-12-20 NOTE — PROGRESS NOTES
Subjective:      Patient ID: Antione German is a 63 y.o. female. Chief Complaint   Patient presents with    Abdominal Pain     1 mo f/u med management, pt having abdominal pain     HPI Recheck after reducing fentanyl to 12. Doing well, would like to wean completely off. Dilaudid prn effective for pain control, no side effects, maintaining ADL's. Pain Assessment  Location of Pain: Pelvis  Location Modifiers: Left, Right  Severity of Pain: 6  Quality of Pain: Aching  Duration of Pain: Persistent  Frequency of Pain: Constant  Aggravating Factors: Standing, Walking  Limiting Behavior: Yes  Relieving Factors: Rest, Heat (take a dilaudid)  Are there other pain locations you wish to document?: No    Allergies   Allergen Reactions    Codeine Shortness Of Breath    Tylenol With Codeine #3 [Acetaminophen-Codeine] Shortness Of Breath    Vicodin [Hydrocodone-Acetaminophen] Shortness Of Breath     Makes her feel crazy    Ace Inhibitors Other (See Comments)     Cough    Aspirin      Makes her heart race      Azulfidine [Sulfasalazine] Hives    Garland-D [Diphenhydramine] Other (See Comments)    Elmiron [Pentosan Polysulfate Sodium] Other (See Comments)     bruising    Fentanyl      Injection only    Medrol [Methylprednisolone]      Tolerates prednisone okay    Makes her stomach hurt. She also stated that Prednisone makes her sick.      Morphine Hives    Other      Mycins (not to have per liver transplant team)    Reglan [Metoclopramide]      Medical induced parkinsons    Tramadol      Makes her feel crazy    Phenergan [Promethazine]      Restless leg       Outpatient Prescriptions Marked as Taking for the 12/20/17 encounter (Office Visit) with Miriam Rincon NP   Medication Sig Dispense Refill    famotidine (PEPCID) 40 MG tablet TAKE 1 TABLET NIGHTLY AS NEEDED 90 tablet 3    tinidazole (TINDAMAX) 250 MG tablet Take 250 mg by mouth daily      clonazePAM (KLONOPIN) 1 MG tablet TAKE 1 TABLET BY MOUTH THREE TIMES -1.1 spine, T -3.1 hip 2.) DEXA, 12/07, T -0.8 spine, T -1.9 spine 3.) DEXA, 03/10, T -0.2 spine, T -1.7 hip  4) DEXA 7/15 FRAX 6.7% 10 yr risk hip. [facility] prefers no Prolia due to infection risk, and GFR 30    Peripheral neuropathy ([address]     emg 2014- severe    Pouchitis ([address] 2006    Versus small bowel bacterial overgrowth, Dr. Siomara Kelsey specialist [facility]. 1.1) [facility] 8/07 for 12 days with dx being pouchitis. 1.2) panniculitis, 8/07, [facility]. 1.3) Repeat scope, Dr. Colin Dhillon, done on pouch 6/11 being okay. Repeat pouchoscopy 9/13& 4/16 [facility]    Primary sclerosing cholangitis     1.1) Following with liver transplant team at [facility] and with Dr. Marla Wang, gastroenterologist at [facility]. 1.2) Multiple stent replacements in the biliary tract for this about every 6 months. 1.3) Liver transplant, 11/04. Dr Raf Hercules, Dr Sujata Powell, Transplant coordinator for labs etc. 1.4) Liver biopsy, 6/06.  SVT (supraventricular tachycardia) ([address]     ablated [facility] 2004    Ulcerative colitis ([address]     Status post colectomy. 1.1) Colonoscopy 1/04, Dr. Israel Hanna, with evidence of marked chronic inflammatory changes. 1.2) Repeat pouchoscopy/colonoscopy, 1/4/06, Dr. Israel Hanna, with evidence of pouchitis. 1.3) Repeat colonoscopy, Dr. Bere Hurd, 6/06. 1.4) Repeat colonoscopy 8/09, Dr. Colin Dhillon. 1.5) Repeat colonoscopy, Dr. Colin Dhillon 04/12.  Vitamin D deficiency        Past Surgical History:   Procedure Laterality Date    ABDOMINAL HERNIA REPAIR  08/05    With extensive revision of scar from the 11/04 procedure, recurrent ventral hernia repair 09/06    APPENDECTOMY      ATRIAL ABLATION SURGERY  2004, 12/10/14    had it done twice.      BREAST BIOPSY Left 9-2014    BREAST SURGERY      Biopsy x2    CATARACT REMOVAL WITH IMPLANT Right 08/02/2017    [facility]    CATARACT REMOVAL WITH IMPLANT Left 08/30/2017    [facility]   

## 2017-12-20 NOTE — PATIENT INSTRUCTIONS
Pain Management Plan:  1. Continue Dilaudid as needed  2. Stop fentanyl    Pain clinic phone numbers  Refills  - Call [phone]. Please leave your name, a working phone number, date of birth, the name, dose, quantity, and last refill date of the prescription, and the pharmacy. Medication or Procedure Questions - Call [phone]. This is the direct line to the [facility] Pain Management Nurses. Appointment or General Questions - Call  [phone]. This is the direct line the  [address] [place] [address] can also use [employer]. Is your [employer] Activated? How to dispose of unused pain medications safely:  · Flush all unused pain medications. This is the FDA's recommended way of disposing these medications. · All other medications can be disposed in the trash mixed in undesirable contents (used coffee grounds, used [employer], etc).

## 2018-01-02 LAB — CYTOLOGY REPORT: NORMAL

## 2018-01-04 RX ORDER — OMEPRAZOLE 20 MG/1
CAPSULE, DELAYED RELEASE ORAL
Qty: 180 CAPSULE | Refills: 3 | Status: SHIPPED | OUTPATIENT
Start: 2018-01-04 | End: 2018-12-30 | Stop reason: SDUPTHER

## 2018-01-05 RX ORDER — CLONAZEPAM 1 MG/1
TABLET ORAL
Qty: 90 TABLET | Refills: 0 | Status: SHIPPED | OUTPATIENT
Start: 2018-01-05 | End: 2018-02-08 | Stop reason: SDUPTHER

## 2018-01-05 NOTE — TELEPHONE ENCOUNTER
Rx phoned to [facility] per order of Dr. Francheska Cross. Printed rx destroyed.

## 2018-01-29 RX ORDER — GABAPENTIN 800 MG/1
TABLET ORAL
Qty: 270 TABLET | Refills: 3 | Status: SHIPPED | OUTPATIENT
Start: 2018-01-29 | End: 2018-11-27 | Stop reason: SDUPTHER

## 2018-01-29 RX ORDER — SULFAMETHOXAZOLE AND TRIMETHOPRIM 800; 160 MG/1; MG/1
TABLET ORAL
Qty: 30 TABLET | Refills: 3 | Status: SHIPPED | OUTPATIENT
Start: 2018-01-29 | End: 2019-01-17 | Stop reason: SDUPTHER

## 2018-02-08 RX ORDER — RALOXIFENE HYDROCHLORIDE 60 MG/1
TABLET, FILM COATED ORAL
Qty: 90 TABLET | Refills: 3 | Status: SHIPPED | OUTPATIENT
Start: 2018-02-08 | End: 2021-06-30

## 2018-02-08 RX ORDER — CLONAZEPAM 1 MG/1
TABLET ORAL
Qty: 90 TABLET | Refills: 0 | Status: SHIPPED | OUTPATIENT
Start: 2018-02-08 | End: 2018-03-20 | Stop reason: SDUPTHER

## 2018-02-08 RX ORDER — RALOXIFENE HYDROCHLORIDE 60 MG/1
TABLET, FILM COATED ORAL
Qty: 30 TABLET | Refills: 0 | Status: SHIPPED | OUTPATIENT
Start: 2018-02-08 | End: 2018-02-08

## 2018-02-08 NOTE — TELEPHONE ENCOUNTER
Patient only need a month supply of the Raloxifene sent to [facility] in [place].

## 2018-02-28 ENCOUNTER — OFFICE VISIT (OUTPATIENT)
Dept: PAIN MANAGEMENT | Age: 63
End: 2018-02-28
Payer: COMMERCIAL

## 2018-02-28 ENCOUNTER — HOSPITAL ENCOUNTER (OUTPATIENT)
Dept: LAB | Age: 63
Setting detail: SPECIMEN
Discharge: HOME | End: 2018-02-28
Payer: COMMERCIAL

## 2018-02-28 ENCOUNTER — HOSPITAL ENCOUNTER (OUTPATIENT)
Age: 63
Setting detail: SPECIMEN
Discharge: HOME | End: 2018-02-28
Payer: COMMERCIAL

## 2018-02-28 VITALS
BODY MASS INDEX: 23.19 KG/M2 | DIASTOLIC BLOOD PRESSURE: 70 MMHG | RESPIRATION RATE: 16 BRPM | WEIGHT: 156.6 LBS | HEIGHT: 69 IN | SYSTOLIC BLOOD PRESSURE: 90 MMHG

## 2018-02-28 DIAGNOSIS — Z79.891 ENCOUNTER FOR LONG-TERM OPIATE ANALGESIC USE: ICD-10-CM

## 2018-02-28 DIAGNOSIS — E78.5 DYSLIPIDEMIA: ICD-10-CM

## 2018-02-28 DIAGNOSIS — Z02.83 ENCOUNTER FOR DRUG SCREENING: ICD-10-CM

## 2018-02-28 DIAGNOSIS — E11.9 CONTROLLED TYPE 2 DIABETES MELLITUS WITHOUT COMPLICATION, WITHOUT LONG-TERM CURRENT USE OF INSULIN (HCC): ICD-10-CM

## 2018-02-28 DIAGNOSIS — R10.9 CHRONIC ABDOMINAL PAIN: Primary | ICD-10-CM

## 2018-02-28 DIAGNOSIS — E55.9 VITAMIN D DEFICIENCY: ICD-10-CM

## 2018-02-28 DIAGNOSIS — G89.29 CHRONIC ABDOMINAL PAIN: Primary | ICD-10-CM

## 2018-02-28 LAB
ABSOLUTE EOS #: 0.1 K/UL (ref 0–0.4)
ABSOLUTE IMMATURE GRANULOCYTE: ABNORMAL K/UL (ref 0–0.3)
ABSOLUTE LYMPH #: 2 K/UL (ref 1–4.8)
ABSOLUTE MONO #: 0.4 K/UL (ref 0.1–1.2)
ALBUMIN SERPL-MCNC: 4.1 G/DL (ref 3.5–5.2)
ALBUMIN/GLOBULIN RATIO: 1.2 (ref 1–2.5)
ALP BLD-CCNC: 126 U/L (ref 35–104)
ALT SERPL-CCNC: 45 U/L (ref 5–33)
ALT SERPL-CCNC: 45 U/L (ref 5–33)
ANION GAP SERPL CALCULATED.3IONS-SCNC: 12 MMOL/L (ref 9–17)
ANION GAP SERPL CALCULATED.3IONS-SCNC: 12 MMOL/L (ref 9–17)
AST SERPL-CCNC: 36 U/L
AST SERPL-CCNC: 36 U/L
BASOPHILS # BLD: 0 % (ref 0–1)
BASOPHILS ABSOLUTE: 0 K/UL (ref 0–0.2)
BILIRUB SERPL-MCNC: 0.27 MG/DL (ref 0.3–1.2)
BUN BLDV-MCNC: 22 MG/DL (ref 8–23)
BUN BLDV-MCNC: 22 MG/DL (ref 8–23)
BUN/CREAT BLD: 18 (ref 9–20)
BUN/CREAT BLD: 18 (ref 9–20)
CALCIUM SERPL-MCNC: 9.7 MG/DL (ref 8.6–10.4)
CALCIUM SERPL-MCNC: 9.7 MG/DL (ref 8.6–10.4)
CHLORIDE BLD-SCNC: 103 MMOL/L (ref 98–107)
CHLORIDE BLD-SCNC: 103 MMOL/L (ref 98–107)
CHOLESTEROL/HDL RATIO: 2.5
CHOLESTEROL/HDL RATIO: 2.5
CHOLESTEROL: 149 MG/DL
CHOLESTEROL: 149 MG/DL
CO2: 28 MMOL/L (ref 20–31)
CO2: 28 MMOL/L (ref 20–31)
CREAT SERPL-MCNC: 1.24 MG/DL (ref 0.5–0.9)
CREAT SERPL-MCNC: 1.24 MG/DL (ref 0.5–0.9)
DIFFERENTIAL TYPE: ABNORMAL
EOSINOPHILS RELATIVE PERCENT: 1 % (ref 1–7)
ESTIMATED AVERAGE GLUCOSE: 114 MG/DL
GFR AFRICAN AMERICAN: 53 ML/MIN
GFR AFRICAN AMERICAN: 53 ML/MIN
GFR NON-AFRICAN AMERICAN: 44 ML/MIN
GFR NON-AFRICAN AMERICAN: 44 ML/MIN
GFR SERPL CREATININE-BSD FRML MDRD: ABNORMAL ML/MIN/{1.73_M2}
GGT: 332 U/L (ref 5–36)
GLUCOSE BLD-MCNC: 111 MG/DL (ref 70–99)
GLUCOSE BLD-MCNC: 111 MG/DL (ref 70–99)
HBA1C MFR BLD: 5.6 % (ref 4.8–5.9)
HCT VFR BLD CALC: 35.6 % (ref 36–46)
HDLC SERPL-MCNC: 60 MG/DL
HDLC SERPL-MCNC: 60 MG/DL
HEMOGLOBIN: 11.9 G/DL (ref 12–16)
IMMATURE GRANULOCYTES: ABNORMAL %
LDL CHOLESTEROL: 71 MG/DL (ref 0–130)
LDL CHOLESTEROL: 71 MG/DL (ref 0–130)
LYMPHOCYTES # BLD: 40 % (ref 16–46)
MAGNESIUM: 2 MG/DL (ref 1.6–2.6)
MCH RBC QN AUTO: 29.8 PG (ref 26–34)
MCHC RBC AUTO-ENTMCNC: 33.6 G/DL (ref 31–37)
MCV RBC AUTO: 88.7 FL (ref 80–100)
MONOCYTES # BLD: 8 % (ref 4–11)
NRBC AUTOMATED: ABNORMAL PER 100 WBC
PDW BLD-RTO: 14.7 % (ref 11–14.5)
PHOSPHORUS: 4 MG/DL (ref 2.6–4.5)
PLATELET # BLD: 151 K/UL (ref 140–450)
PLATELET ESTIMATE: ABNORMAL
PMV BLD AUTO: 7.8 FL (ref 6–12)
POTASSIUM SERPL-SCNC: 4.4 MMOL/L (ref 3.7–5.3)
POTASSIUM SERPL-SCNC: 4.4 MMOL/L (ref 3.7–5.3)
RBC # BLD: 4.01 M/UL (ref 4–5.2)
RBC # BLD: ABNORMAL 10*6/UL
SEG NEUTROPHILS: 51 % (ref 43–77)
SEGMENTED NEUTROPHILS ABSOLUTE COUNT: 2.6 K/UL (ref 1.8–7.7)
SODIUM BLD-SCNC: 143 MMOL/L (ref 135–144)
SODIUM BLD-SCNC: 143 MMOL/L (ref 135–144)
TOTAL CK: 57 U/L (ref 26–192)
TOTAL PROTEIN: 7.5 G/DL (ref 6.4–8.3)
TRIGL SERPL-MCNC: 89 MG/DL
TRIGL SERPL-MCNC: 89 MG/DL
VITAMIN D 25-HYDROXY: 56.1 NG/ML (ref 30–100)
VLDLC SERPL CALC-MCNC: NORMAL MG/DL (ref 1–30)
VLDLC SERPL CALC-MCNC: NORMAL MG/DL (ref 1–30)
WBC # BLD: 5.1 K/UL (ref 3.5–11)
WBC # BLD: ABNORMAL 10*3/UL

## 2018-02-28 PROCEDURE — 84100 ASSAY OF PHOSPHORUS: CPT

## 2018-02-28 PROCEDURE — 82550 ASSAY OF CK (CPK): CPT

## 2018-02-28 PROCEDURE — 99214 OFFICE O/P EST MOD 30 MIN: CPT | Performed by: NURSE PRACTITIONER

## 2018-02-28 PROCEDURE — 84460 ALANINE AMINO (ALT) (SGPT): CPT

## 2018-02-28 PROCEDURE — 82306 VITAMIN D 25 HYDROXY: CPT

## 2018-02-28 PROCEDURE — 83735 ASSAY OF MAGNESIUM: CPT

## 2018-02-28 PROCEDURE — 80053 COMPREHEN METABOLIC PANEL: CPT

## 2018-02-28 PROCEDURE — 84450 TRANSFERASE (AST) (SGOT): CPT

## 2018-02-28 PROCEDURE — 80307 DRUG TEST PRSMV CHEM ANLYZR: CPT

## 2018-02-28 PROCEDURE — 80048 BASIC METABOLIC PNL TOTAL CA: CPT

## 2018-02-28 PROCEDURE — 83036 HEMOGLOBIN GLYCOSYLATED A1C: CPT

## 2018-02-28 PROCEDURE — 80061 LIPID PANEL: CPT

## 2018-02-28 PROCEDURE — 82977 ASSAY OF GGT: CPT

## 2018-02-28 PROCEDURE — 36415 COLL VENOUS BLD VENIPUNCTURE: CPT

## 2018-02-28 PROCEDURE — 85025 COMPLETE CBC W/AUTO DIFF WBC: CPT

## 2018-02-28 RX ORDER — HYDROMORPHONE HYDROCHLORIDE 2 MG/1
2 TABLET ORAL EVERY 12 HOURS PRN
Qty: 60 TABLET | Refills: 0 | Status: SHIPPED | OUTPATIENT
Start: 2018-02-28 | End: 2018-08-09 | Stop reason: SDUPTHER

## 2018-02-28 ASSESSMENT — ENCOUNTER SYMPTOMS
DIARRHEA: 1
ABDOMINAL PAIN: 1
NAUSEA: 1
VOMITING: 0
CONSTIPATION: 0

## 2018-02-28 NOTE — PROGRESS NOTES
DS) 800-160 MG per tablet TAKE ONE-HALF (1/2) TABLET 5 DAYS A WEEK ( MONDAY THROUGH FRIDAY ) 30 tablet 3    gabapentin (NEURONTIN) 800 MG tablet TAKE 1 TABLET THREE TIMES A  tablet 3    omeprazole (PRILOSEC) 20 MG delayed release capsule TAKE 1 CAPSULE TWICE A  capsule 3    famotidine (PEPCID) 40 MG tablet TAKE 1 TABLET NIGHTLY AS NEEDED 90 tablet 3    tinidazole (TINDAMAX) 250 MG tablet Take 250 mg by mouth daily      HYDROmorphone (DILAUDID) 2 MG tablet Take 1 tablet by mouth 3 times daily as needed for Pain . 90 tablet 0    atorvastatin (LIPITOR) 10 MG tablet Take 1 tablet by mouth daily 30 tablet 11    sertraline (ZOLOFT) 50 MG tablet Take 1 tablet by mouth daily Take along with zoloft 100 mg daily (total 150 mg daily) 90 tablet 3    sertraline (ZOLOFT) 100 MG tablet TAKE 1 TABLET DAILY ALONG WITH A 50 MG TABLET (TOTAL  MG DAILY) 90 tablet 3    levothyroxine (SYNTHROID) 125 MCG tablet take 1 tablet by mouth once daily 30 tablet 11    ondansetron (ZOFRAN ODT) 4 MG disintegrating tablet Take 1 tablet by mouth every 8 hours as needed for Nausea 20 tablet 0    glucose blood VI test strips (ASCENSIA AUTODISC VI;ONE TOUCH ULTRA TEST VI) strip One touch ultra test strips. Test up to 4 times a day. 100 each 11    calcium carbonate (OSCAL) 500 MG TABS tablet Take 500 mg by mouth 2 times daily      PROGRAF 1 MG capsule Take 1 mg by mouth 2 times daily.  Cholecalciferol (VITAMIN D3)   Take 1,000 Int'l Units by mouth daily          Past Medical History:   Diagnosis Date    Anxiety     Breast disease     History of fibroadenomatous breast disease with biopsy x 2.    Choroidal neovascularization     Peripapillary with hemorrhage, Dr. Laron Dash, 2012    Chronic abdominal pain     [place] has attributed to pouchitis and possible adhesions. Extensive repeated eval.  EGD 9/13.   dr Nicolasa Mcneil also following  Repeat egd 11/28/14 and , Pouchoscopy 11/28/14 Dr Nolvia Mckeon  EGD- patchy gastritis, repeat EGD 4/16 Dr Denis Das 9/15 [facility]    Chronic kidney disease     1.)Creatine clearance 58 05/06 and total urine protein 405. 2.)Dr. Poonam Haines, [address] [place] [address], felt this was secondary to Prograf and rapamune 6/06, 3.)history of hyperkalemia leading to discontinuatuin of ACE inhibitor. 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 [facility]abel[facility] 2013 recommended watchful waiting. 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  GERD (gastroesophageal reflux disease)     EGD 9/13. dr Romana Mace also following  Repeat egd 11/28/14 and ,  EGD- patchy gastritis, repeat EGD 4/16 Dr Oneyda Wilson [id number](526.0)     Herniated cervical disc 03/09    C6-C7 with compression of the thecal sac most significantly on MRI. 1.1) Dr. Marquis almanza recommended a 2nd opinion from neurology. 1.2) Had a 2nd opinion for Dr. Katiuska Escalante who referred to a neurosurgeon in [facility] [employer] for another opinion. 1.3)  That surgeon, Dr. Gabino Brown felt surgery was not warranted and he would recommend PT or possible injection therapy.  History of atrial fibrillation     Stress test negative October 2014. Echo with moderate MR normal ejection fraction September 2014. Ablation performed December 2014.  Hypertension     Hypothyroidism     Interstitial cystitis     Dr. Katerine Murrieta    Iron deficiency anemia     Leukocytoclastic vasculitis ([facility] [address]     possible    Liver transplanted [facility])     [facility].   Liver Bx 9/13    MGUS (monoclonal gammopathy of unknown significance)     [place], Dr. Poonam Haines  [facility]    Migraine headache     evaluation, Dr Nirmala Harding, [facility] Mitral valve regurgitation     mod on echo 9/14, & 4/16, stable 8/17    Osteoporosis     1.) [place]     SECTION      three times    CHOLECYSTECTOMY      COLON SURGERY      total colectomy and colostomy with reversal    COLONOSCOPY      EYE SURGERY      laser done on both eyes    HERNIA REPAIR  2014, revision     Dr Mau Baeza. Clinic. Also took done adhesions and removed old sutures. Reexploration     LIVER BIOPSY      LIVER TRANSPLANT      [facility]    [place] REMV CATARACT [place] LENS Right 2017    Right Phaco w/ IOL  performed by Jane Flores MD at [facility] Left 2017    Left Phaco w/ IOL topical performed by Jane Flores MD at [address] [place] [address],[address] Bilateral     [facility]    UPPER GASTROINTESTINAL ENDOSCOPY      VENTRAL HERNIA REPAIR      Recurrent repair, plastic surgery, [facility]. Also with extensive lysis of adhesions. Family History   Problem Relation Age of Onset    Heart Disease Mother     COPD Mother     Diabetes Father     Cirrhosis Other      related to drinking    Cataracts Neg Hx     Glaucoma Neg Hx        Social History     Social History    Marital status:      Spouse name: alex    Number of children: 2    Years of education: N/A     Occupational History    unemployed      Social History Main Topics    Smoking status: Former Smoker    Smokeless tobacco: Never Used    Alcohol use No    Drug use: No    Sexual activity: Yes     Partners: Male     Other Topics Concern    None     Social History Narrative    None     Review of Systems   Cardiovascular: Positive for chest pain. Gastrointestinal: Positive for abdominal pain, diarrhea and nausea. Negative for constipation and vomiting. Neurological: Positive for weakness and numbness. Psychiatric/Behavioral: Positive for sleep disturbance. The patient is nervous/anxious (takes Klonopin BID).         Objective:   Physical Exam   Constitutional: She is

## 2018-02-28 NOTE — PATIENT INSTRUCTIONS
Pain Management Plan:  1. Continue current pain medications to improve quality of life and function. Pain clinic phone numbers  Refills  - Call [phone]. Please leave your name, a working phone number, date of birth, the name, dose, quantity, and last refill date of the prescription, and the pharmacy. Medication or Procedure Questions - Call [phone]. This is the direct line to the [facility] Pain Management Nurses. Appointment or General Questions - Call  [phone]. This is the direct line the  [address] [place] [address] can also use [employer]. Is your MyChart Activated? How to dispose of unused pain medications safely:  · Flush all unused pain medications. This is the FDA's recommended way of disposing these medications. · All other medications can be disposed in the trash mixed in undesirable contents (used coffee grounds, used [employer], etc).

## 2018-03-02 LAB
6-ACETYLMORPHINE, UR: NOT DETECTED
7-AMINOCLONAZEPAM, URINE: PRESENT
ALPHA-OH-ALPRAZ, URINE: NOT DETECTED
ALPRAZOLAM, URINE: NOT DETECTED
AMPHETAMINES, URINE: NOT DETECTED
BARBITURATES, URINE: NOT DETECTED
BENZOYLECGONINE, UR: NOT DETECTED
BUPRENORPHINE URINE: NOT DETECTED
CARISOPRODOL, UR: NOT DETECTED
CLONAZEPAM, URINE: NOT DETECTED
CODEINE, URINE: NOT DETECTED
CREATININE URINE: 129.8 MG/DL (ref 20–400)
DIAZEPAM, URINE: NOT DETECTED
EER PAIN MGT DRUG PANEL, HIGH RES/EMIT U: NORMAL
ETHYL GLUCURONIDE UR: NOT DETECTED
FENTANYL URINE: NOT DETECTED
HYDROCODONE, URINE: NOT DETECTED
HYDROMORPHONE, URINE: NOT DETECTED
LORAZEPAM, URINE: NOT DETECTED
MARIJUANA METAB, UR: NOT DETECTED
MDA, UR: NOT DETECTED
MDEA, EVE, UR: NOT DETECTED
MDMA URINE: NOT DETECTED
MEPERIDINE METAB, UR: NOT DETECTED
METHADONE, URINE: NOT DETECTED
METHAMPHETAMINE, URINE: NOT DETECTED
METHYLPHENIDATE: NOT DETECTED
MIDAZOLAM, URINE: NOT DETECTED
MORPHINE URINE: NOT DETECTED
NORBUPRENORPHINE, URINE: NOT DETECTED
NORDIAZEPAM, URINE: NOT DETECTED
NORFENTANYL, URINE: NOT DETECTED
NORHYDROCODONE, URINE: NOT DETECTED
NOROXYCODONE, URINE: NOT DETECTED
NOROXYMORPHONE, URINE: NOT DETECTED
OXAZEPAM, URINE: NOT DETECTED
OXYCODONE URINE: NOT DETECTED
OXYMORPHONE, URINE: NOT DETECTED
PAIN MGT DRUG PANEL, HI RES, UR: NORMAL
PCP,URINE: NOT DETECTED
PHENTERMINE, UR: NOT DETECTED
PROPOXYPHENE, URINE: NOT DETECTED
TAPENTADOL, URINE: NOT DETECTED
TAPENTADOL-O-SULFATE, URINE: NOT DETECTED
TEMAZEPAM, URINE: NOT DETECTED
TRAMADOL, URINE: NOT DETECTED
ZOLPIDEM, URINE: NOT DETECTED

## 2018-03-20 RX ORDER — CLONAZEPAM 1 MG/1
TABLET ORAL
Qty: 90 TABLET | Refills: 0 | Status: SHIPPED | OUTPATIENT
Start: 2018-03-20 | End: 2018-04-26 | Stop reason: SDUPTHER

## 2018-03-23 ENCOUNTER — OFFICE VISIT (OUTPATIENT)
Dept: INTERNAL MEDICINE | Age: 63
End: 2018-03-23
Payer: COMMERCIAL

## 2018-03-23 VITALS
BODY MASS INDEX: 23.25 KG/M2 | HEIGHT: 69 IN | DIASTOLIC BLOOD PRESSURE: 62 MMHG | WEIGHT: 157 LBS | SYSTOLIC BLOOD PRESSURE: 108 MMHG | HEART RATE: 72 BPM

## 2018-03-23 DIAGNOSIS — N82.9 FISTULA INVOLVING FEMALE GENITAL TRACT: ICD-10-CM

## 2018-03-23 DIAGNOSIS — E55.9 VITAMIN D DEFICIENCY: ICD-10-CM

## 2018-03-23 DIAGNOSIS — M50.20 HERNIATED CERVICAL DISC: Primary | ICD-10-CM

## 2018-03-23 DIAGNOSIS — Z94.4 LIVER TRANSPLANTED (HCC): ICD-10-CM

## 2018-03-23 DIAGNOSIS — M81.0 OSTEOPOROSIS WITHOUT CURRENT PATHOLOGICAL FRACTURE, UNSPECIFIED OSTEOPOROSIS TYPE: ICD-10-CM

## 2018-03-23 DIAGNOSIS — R10.9 CHRONIC ABDOMINAL PAIN: ICD-10-CM

## 2018-03-23 DIAGNOSIS — D69.6 THROMBOCYTOPENIA (HCC): ICD-10-CM

## 2018-03-23 DIAGNOSIS — G89.29 CHRONIC ABDOMINAL PAIN: ICD-10-CM

## 2018-03-23 DIAGNOSIS — E78.5 DYSLIPIDEMIA: ICD-10-CM

## 2018-03-23 DIAGNOSIS — E11.9 CONTROLLED TYPE 2 DIABETES MELLITUS WITHOUT COMPLICATION, WITHOUT LONG-TERM CURRENT USE OF INSULIN (HCC): ICD-10-CM

## 2018-03-23 DIAGNOSIS — R20.0 RIGHT ARM NUMBNESS: ICD-10-CM

## 2018-03-23 DIAGNOSIS — I10 ESSENTIAL HYPERTENSION: ICD-10-CM

## 2018-03-23 DIAGNOSIS — F32.9 MAJOR DEPRESSION, CHRONIC: ICD-10-CM

## 2018-03-23 DIAGNOSIS — I34.0 MITRAL VALVE INSUFFICIENCY, UNSPECIFIED ETIOLOGY: ICD-10-CM

## 2018-03-23 DIAGNOSIS — K83.01 PRIMARY SCLEROSING CHOLANGITIS: ICD-10-CM

## 2018-03-23 DIAGNOSIS — I48.91 ATRIAL FIBRILLATION WITH RVR (HCC): ICD-10-CM

## 2018-03-23 DIAGNOSIS — E03.8 OTHER SPECIFIED HYPOTHYROIDISM: ICD-10-CM

## 2018-03-23 PROCEDURE — 99214 OFFICE O/P EST MOD 30 MIN: CPT | Performed by: INTERNAL MEDICINE

## 2018-03-23 NOTE — PROGRESS NOTES
02/28/2019    Potassium monitoring  02/28/2019    Creatinine monitoring  02/28/2019    Breast cancer screen  12/13/2019    Cervical cancer screen  12/19/2020    Colon cancer screen colonoscopy  10/30/2023    DTaP/Tdap/Td vaccine (2 - Td) 12/19/2027    Flu vaccine  Completed    Pneumococcal highest risk  Completed    Hepatitis C screen  Completed    HIV screen  Completed

## 2018-03-24 NOTE — PROGRESS NOTES
The abdominal pain is remarkably better. She actually looks better today than I have seen her in a number of years. · Atrial Fibrillation  - She is not on anticoagulation. No signs or symptoms of stroke. No change in vision, speech or weakness or numbness. · Hypertension  - She is doing well with her antihypertensive meds. No chest pain, dizziness or palpitations. · Thyroid Disease  - She has been doing fine with her thyroid medication and takes it quite regularly. · Diabetes Mellitus  - She has not had trouble with her sugars,  No hypoglycemia. No polyuria or polydipsia. Trying to watch diet and be as active as possible. Review of Systems:  Constitutional:  Negative for chills, fever, and weight loss. HENT:  Negative for congestion, ear pain, and sore throat. Eyes:  Negative for blurred vision, double vision, pain and discharge. Respiratory:  Negative for cough, shortness of breath, and wheezing. Cardiovascular:  Negative for chest pain, palpitations, and PND. Gastrointestinal:  Negative for abdominal pain, blood in stool, constipation, diarrhea, nausea and vomiting. Genitourinary:  Negative for dysuria, frequency, and hematuria. Musculoskeletal:  Negative for myalgias. Skin:  Negative for rash. Neurological:  Negative for tingling, sensory change, speech change, focal weakness, seizures, and headaches. Positive for arm tingling. Endo/Heme/Allergies:  Does not bruise/bleed easily. Psychiatric/Behavioral:  Negative for hallucinations and suicidal ideas.      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 90 tablet 0    HYDROmorphone (DILAUDID) 2 MG tablet Take 1 tablet by mouth every 12 hours as needed for Pain for up to 30 days. 60 tablet 0    raloxifene (EVISTA) 60 MG tablet TAKE 1 TABLET DAILY 90 tablet 3    sulfamethoxazole-trimethoprim (BACTRIM DS;SEPTRA DS) 800-160 MG per tablet TAKE ONE-HALF (1/2) TABLET 5 DAYS A WEEK ( MONDAY THROUGH FRIDAY ) 30 tablet 3    gabapentin (NEURONTIN) 800 MG tablet TAKE 1 TABLET THREE TIMES A  tablet 3    omeprazole (PRILOSEC) 20 MG delayed release capsule TAKE 1 CAPSULE TWICE A  capsule 3    famotidine (PEPCID) 40 MG tablet TAKE 1 TABLET NIGHTLY AS NEEDED 90 tablet 3    tinidazole (TINDAMAX) 250 MG tablet Take 250 mg by mouth daily      HYDROmorphone (DILAUDID) 2 MG tablet Take 1 tablet by mouth 3 times daily as needed for Pain .  90 tablet 0    atorvastatin (LIPITOR) 10 MG tablet Take 1 tablet by mouth daily 30 tablet 11    sertraline (ZOLOFT) 50 MG tablet Take 1 tablet by mouth daily Take along with zoloft 100 mg daily (total 150 mg daily) 90 tablet 3    sertraline (ZOLOFT) 100 MG tablet TAKE 1 TABLET DAILY ALONG WITH A 50 MG TABLET (TOTAL  MG DAILY) 90 tablet 3    levothyroxine (SYNTHROID) 125 MCG tablet take 1 tablet by mouth once daily 30 tablet 11    ondansetron (ZOFRAN ODT) 4 MG disintegrating tablet Take 1 tablet by mouth every 8 hours as needed for Nausea 20 tablet 0    glucose blood VI test strips (ASCENSIA AUTODISC VI;ONE

## 2018-03-27 ENCOUNTER — HOSPITAL ENCOUNTER (OUTPATIENT)
Dept: LAB | Age: 63
Setting detail: SPECIMEN
Discharge: HOME | End: 2018-03-27
Payer: COMMERCIAL

## 2018-03-27 LAB
ABSOLUTE EOS #: 0.1 K/UL (ref 0–0.4)
ABSOLUTE IMMATURE GRANULOCYTE: ABNORMAL K/UL (ref 0–0.3)
ABSOLUTE LYMPH #: 2.3 K/UL (ref 1–4.8)
ABSOLUTE MONO #: 0.4 K/UL (ref 0.1–1.2)
ALBUMIN SERPL-MCNC: 4.2 G/DL (ref 3.5–5.2)
ALBUMIN/GLOBULIN RATIO: 1.2 (ref 1–2.5)
ALP BLD-CCNC: 121 U/L (ref 35–104)
ALT SERPL-CCNC: 32 U/L (ref 5–33)
ANION GAP SERPL CALCULATED.3IONS-SCNC: 12 MMOL/L (ref 9–17)
AST SERPL-CCNC: 37 U/L
BASOPHILS # BLD: 1 % (ref 0–1)
BASOPHILS ABSOLUTE: 0 K/UL (ref 0–0.2)
BILIRUB SERPL-MCNC: 0.35 MG/DL (ref 0.3–1.2)
BUN BLDV-MCNC: 27 MG/DL (ref 8–23)
BUN/CREAT BLD: 20 (ref 9–20)
CALCIUM SERPL-MCNC: 9.6 MG/DL (ref 8.6–10.4)
CHLORIDE BLD-SCNC: 101 MMOL/L (ref 98–107)
CHOLESTEROL/HDL RATIO: 2.4
CHOLESTEROL: 160 MG/DL
CO2: 31 MMOL/L (ref 20–31)
CREAT SERPL-MCNC: 1.32 MG/DL (ref 0.5–0.9)
DIFFERENTIAL TYPE: ABNORMAL
EOSINOPHILS RELATIVE PERCENT: 1 % (ref 1–7)
GFR AFRICAN AMERICAN: 50 ML/MIN
GFR NON-AFRICAN AMERICAN: 41 ML/MIN
GFR SERPL CREATININE-BSD FRML MDRD: ABNORMAL ML/MIN/{1.73_M2}
GFR SERPL CREATININE-BSD FRML MDRD: ABNORMAL ML/MIN/{1.73_M2}
GGT: 350 U/L (ref 5–36)
GLUCOSE BLD-MCNC: 102 MG/DL (ref 70–99)
HCT VFR BLD CALC: 36.8 % (ref 36–46)
HDLC SERPL-MCNC: 68 MG/DL
HEMOGLOBIN: 12.4 G/DL (ref 12–16)
IMMATURE GRANULOCYTES: ABNORMAL %
LDL CHOLESTEROL: 74 MG/DL (ref 0–130)
LYMPHOCYTES # BLD: 45 % (ref 16–46)
MAGNESIUM: 2.1 MG/DL (ref 1.6–2.6)
MCH RBC QN AUTO: 30 PG (ref 26–34)
MCHC RBC AUTO-ENTMCNC: 33.6 G/DL (ref 31–37)
MCV RBC AUTO: 89.1 FL (ref 80–100)
MONOCYTES # BLD: 7 % (ref 4–11)
NRBC AUTOMATED: ABNORMAL PER 100 WBC
PDW BLD-RTO: 15.1 % (ref 11–14.5)
PHOSPHORUS: 3.4 MG/DL (ref 2.6–4.5)
PLATELET # BLD: 128 K/UL (ref 140–450)
PLATELET ESTIMATE: ABNORMAL
PMV BLD AUTO: 7.8 FL (ref 6–12)
POTASSIUM SERPL-SCNC: 4.2 MMOL/L (ref 3.7–5.3)
RBC # BLD: 4.13 M/UL (ref 4–5.2)
RBC # BLD: ABNORMAL 10*6/UL
SEG NEUTROPHILS: 46 % (ref 43–77)
SEGMENTED NEUTROPHILS ABSOLUTE COUNT: 2.4 K/UL (ref 1.8–7.7)
SODIUM BLD-SCNC: 144 MMOL/L (ref 135–144)
TOTAL PROTEIN: 7.7 G/DL (ref 6.4–8.3)
TRIGL SERPL-MCNC: 90 MG/DL
VLDLC SERPL CALC-MCNC: NORMAL MG/DL (ref 1–30)
WBC # BLD: 5.2 K/UL (ref 3.5–11)
WBC # BLD: ABNORMAL 10*3/UL

## 2018-03-27 PROCEDURE — 80061 LIPID PANEL: CPT

## 2018-03-27 PROCEDURE — 80053 COMPREHEN METABOLIC PANEL: CPT

## 2018-03-27 PROCEDURE — 85025 COMPLETE CBC W/AUTO DIFF WBC: CPT

## 2018-03-27 PROCEDURE — 36415 COLL VENOUS BLD VENIPUNCTURE: CPT

## 2018-03-27 PROCEDURE — 84100 ASSAY OF PHOSPHORUS: CPT

## 2018-03-27 PROCEDURE — 82977 ASSAY OF GGT: CPT

## 2018-03-27 PROCEDURE — 83735 ASSAY OF MAGNESIUM: CPT

## 2018-04-03 ENCOUNTER — HOSPITAL ENCOUNTER (OUTPATIENT)
Dept: MRI IMAGING | Age: 63
Discharge: HOME | End: 2018-04-05
Payer: COMMERCIAL

## 2018-04-03 DIAGNOSIS — M50.20 HERNIATED CERVICAL DISC: ICD-10-CM

## 2018-04-03 DIAGNOSIS — R20.0 RIGHT ARM NUMBNESS: ICD-10-CM

## 2018-04-03 PROCEDURE — 72141 MRI NECK SPINE W/O DYE: CPT

## 2018-04-05 DIAGNOSIS — R20.2 TINGLING IN EXTREMITIES: ICD-10-CM

## 2018-04-05 DIAGNOSIS — R29.2 REFLEXES INCREASED: Primary | ICD-10-CM

## 2018-04-23 DIAGNOSIS — F41.9 ANXIETY: Primary | ICD-10-CM

## 2018-04-23 RX ORDER — CLONAZEPAM 1 MG/1
TABLET ORAL
Qty: 90 TABLET | Refills: 0 | Status: CANCELLED | OUTPATIENT
Start: 2018-04-23 | End: 2018-05-23

## 2018-04-23 RX ORDER — CLONAZEPAM 1 MG/1
TABLET ORAL
Qty: 90 TABLET | Refills: 0 | OUTPATIENT
Start: 2018-04-23 | End: 2018-05-31 | Stop reason: SDUPTHER

## 2018-04-26 ENCOUNTER — OFFICE VISIT (OUTPATIENT)
Dept: PAIN MANAGEMENT | Age: 63
End: 2018-04-26
Payer: COMMERCIAL

## 2018-04-26 VITALS
WEIGHT: 157.6 LBS | HEIGHT: 69 IN | HEART RATE: 88 BPM | BODY MASS INDEX: 23.34 KG/M2 | DIASTOLIC BLOOD PRESSURE: 80 MMHG | SYSTOLIC BLOOD PRESSURE: 94 MMHG

## 2018-04-26 DIAGNOSIS — R10.9 CHRONIC ABDOMINAL PAIN: Primary | ICD-10-CM

## 2018-04-26 DIAGNOSIS — G89.29 CHRONIC ABDOMINAL PAIN: Primary | ICD-10-CM

## 2018-04-26 DIAGNOSIS — G89.29 ENCOUNTER FOR CHRONIC PAIN MANAGEMENT: ICD-10-CM

## 2018-04-26 PROCEDURE — 99213 OFFICE O/P EST LOW 20 MIN: CPT | Performed by: NURSE PRACTITIONER

## 2018-04-26 ASSESSMENT — ENCOUNTER SYMPTOMS
CONSTIPATION: 0
DIARRHEA: 1
ABDOMINAL PAIN: 1
VOMITING: 0
NAUSEA: 1

## 2018-05-24 ENCOUNTER — HOSPITAL ENCOUNTER (OUTPATIENT)
Dept: LAB | Age: 63
Setting detail: SPECIMEN
Discharge: HOME | End: 2018-05-24
Payer: COMMERCIAL

## 2018-05-24 ENCOUNTER — OFFICE VISIT (OUTPATIENT)
Dept: NEUROLOGY | Age: 63
End: 2018-05-24
Payer: COMMERCIAL

## 2018-05-24 VITALS
HEART RATE: 75 BPM | BODY MASS INDEX: 23.22 KG/M2 | SYSTOLIC BLOOD PRESSURE: 136 MMHG | HEIGHT: 69 IN | OXYGEN SATURATION: 98 % | WEIGHT: 156.8 LBS | DIASTOLIC BLOOD PRESSURE: 64 MMHG

## 2018-05-24 DIAGNOSIS — G89.29 CHRONIC ABDOMINAL PAIN: ICD-10-CM

## 2018-05-24 DIAGNOSIS — I48.91 ATRIAL FIBRILLATION WITH RVR (HCC): ICD-10-CM

## 2018-05-24 DIAGNOSIS — G63 POLYNEUROPATHY ASSOCIATED WITH UNDERLYING DISEASE (HCC): ICD-10-CM

## 2018-05-24 DIAGNOSIS — R10.9 CHRONIC ABDOMINAL PAIN: ICD-10-CM

## 2018-05-24 DIAGNOSIS — I10 ESSENTIAL HYPERTENSION: ICD-10-CM

## 2018-05-24 DIAGNOSIS — E55.9 VITAMIN D DEFICIENCY: ICD-10-CM

## 2018-05-24 DIAGNOSIS — E11.21 CONTROLLED TYPE 2 DIABETES MELLITUS WITH DIABETIC NEPHROPATHY, WITHOUT LONG-TERM CURRENT USE OF INSULIN (HCC): ICD-10-CM

## 2018-05-24 DIAGNOSIS — Z94.4 LIVER TRANSPLANTED (HCC): ICD-10-CM

## 2018-05-24 DIAGNOSIS — G45.0 VBI (VERTEBROBASILAR INSUFFICIENCY): ICD-10-CM

## 2018-05-24 DIAGNOSIS — K83.01 PRIMARY SCLEROSING CHOLANGITIS: ICD-10-CM

## 2018-05-24 DIAGNOSIS — M50.20 HERNIATED CERVICAL DISC: ICD-10-CM

## 2018-05-24 DIAGNOSIS — E03.9 HYPOTHYROIDISM, UNSPECIFIED TYPE: ICD-10-CM

## 2018-05-24 DIAGNOSIS — I67.82 CHRONIC CEREBRAL ISCHEMIA: ICD-10-CM

## 2018-05-24 DIAGNOSIS — D69.6 THROMBOCYTOPENIA (HCC): ICD-10-CM

## 2018-05-24 DIAGNOSIS — E78.5 DYSLIPIDEMIA: ICD-10-CM

## 2018-05-24 DIAGNOSIS — N18.30 CHRONIC RENAL FAILURE, STAGE 3 (MODERATE) (HCC): ICD-10-CM

## 2018-05-24 DIAGNOSIS — M54.12 CERVICAL RADICULOPATHY: ICD-10-CM

## 2018-05-24 DIAGNOSIS — F32.9 MAJOR DEPRESSION, CHRONIC: ICD-10-CM

## 2018-05-24 DIAGNOSIS — F32.9 MAJOR DEPRESSION, CHRONIC: Primary | ICD-10-CM

## 2018-05-24 LAB
CREAT SERPL-MCNC: 1.85 MG/DL (ref 0.5–0.9)
GFR AFRICAN AMERICAN: 34 ML/MIN
GFR NON-AFRICAN AMERICAN: 28 ML/MIN
GFR SERPL CREATININE-BSD FRML MDRD: ABNORMAL ML/MIN/{1.73_M2}
GFR SERPL CREATININE-BSD FRML MDRD: ABNORMAL ML/MIN/{1.73_M2}

## 2018-05-24 PROCEDURE — 84155 ASSAY OF PROTEIN SERUM: CPT

## 2018-05-24 PROCEDURE — 84165 PROTEIN E-PHORESIS SERUM: CPT

## 2018-05-24 PROCEDURE — 86334 IMMUNOFIX E-PHORESIS SERUM: CPT

## 2018-05-24 PROCEDURE — 82565 ASSAY OF CREATININE: CPT

## 2018-05-24 PROCEDURE — 99205 OFFICE O/P NEW HI 60 MIN: CPT | Performed by: PSYCHIATRY & NEUROLOGY

## 2018-05-24 PROCEDURE — 82607 VITAMIN B-12: CPT

## 2018-05-24 PROCEDURE — 82746 ASSAY OF FOLIC ACID SERUM: CPT

## 2018-05-24 PROCEDURE — 36415 COLL VENOUS BLD VENIPUNCTURE: CPT

## 2018-05-24 ASSESSMENT — ENCOUNTER SYMPTOMS
BOWEL INCONTINENCE: 0
WHEEZING: 0
CONSTIPATION: 0
APNEA: 0
DIARRHEA: 0
VOICE CHANGE: 0
SHORTNESS OF BREATH: 0
VOMITING: 0
CHOKING: 0
PHOTOPHOBIA: 0
EYE PAIN: 0
BLOOD IN STOOL: 0
FACIAL SWELLING: 0
COLOR CHANGE: 0
ABDOMINAL DISTENTION: 0
ABDOMINAL PAIN: 0
SORE THROAT: 0
COUGH: 0
SINUS PRESSURE: 0
TROUBLE SWALLOWING: 0
BACK PAIN: 0
NAUSEA: 0
VISUAL CHANGE: 0
EYE REDNESS: 0
EYE ITCHING: 0
EYE DISCHARGE: 0
CHEST TIGHTNESS: 0

## 2018-05-25 LAB
FOLATE: >20 NG/ML
VITAMIN B-12: 1734 PG/ML (ref 232–1245)

## 2018-05-29 LAB
ALBUMIN (CALCULATED): 5.1 G/DL (ref 3.2–5.2)
ALBUMIN PERCENT: 68 % (ref 45–65)
ALPHA 1 PERCENT: 2 % (ref 3–6)
ALPHA 2 PERCENT: 7 % (ref 6–13)
ALPHA-1-GLOBULIN: 0.2 G/DL (ref 0.1–0.4)
ALPHA-2-GLOBULIN: 0.6 G/DL (ref 0.5–0.9)
BETA GLOBULIN: 0.7 G/DL (ref 0.5–1.1)
BETA PERCENT: 9 % (ref 11–19)
GAMMA GLOBULIN %: 14 % (ref 9–20)
GAMMA GLOBULIN: 1 G/DL (ref 0.5–1.5)
PATHOLOGIST: ABNORMAL
PATHOLOGIST: NORMAL
PROTEIN ELECTROPHORESIS, SERUM: ABNORMAL
SERUM IFX INTERP: NORMAL
TOTAL PROT. SUM,%: 100 % (ref 98–102)
TOTAL PROT. SUM: 7.6 G/DL (ref 6.3–8.2)
TOTAL PROTEIN: 7.5 G/DL (ref 6.4–8.3)

## 2018-05-31 DIAGNOSIS — F41.9 ANXIETY: ICD-10-CM

## 2018-05-31 RX ORDER — CLONAZEPAM 1 MG/1
TABLET ORAL
Qty: 90 TABLET | Refills: 0 | Status: SHIPPED | OUTPATIENT
Start: 2018-05-31 | End: 2018-07-03 | Stop reason: SDUPTHER

## 2018-06-01 RX ORDER — CLONAZEPAM 1 MG/1
TABLET ORAL
Qty: 90 TABLET | Refills: 0 | OUTPATIENT
Start: 2018-06-01

## 2018-06-04 ENCOUNTER — HOSPITAL ENCOUNTER (OUTPATIENT)
Dept: MRI IMAGING | Age: 63
Discharge: HOME | End: 2018-06-06
Payer: COMMERCIAL

## 2018-06-04 DIAGNOSIS — N18.30 CHRONIC RENAL FAILURE, STAGE 3 (MODERATE) (HCC): ICD-10-CM

## 2018-06-04 DIAGNOSIS — E11.21 CONTROLLED TYPE 2 DIABETES MELLITUS WITH DIABETIC NEPHROPATHY, WITHOUT LONG-TERM CURRENT USE OF INSULIN (HCC): ICD-10-CM

## 2018-06-04 DIAGNOSIS — M54.12 CERVICAL RADICULOPATHY: ICD-10-CM

## 2018-06-04 DIAGNOSIS — G63 POLYNEUROPATHY ASSOCIATED WITH UNDERLYING DISEASE (HCC): ICD-10-CM

## 2018-06-04 DIAGNOSIS — I67.82 CHRONIC CEREBRAL ISCHEMIA: ICD-10-CM

## 2018-06-04 DIAGNOSIS — G45.0 VBI (VERTEBROBASILAR INSUFFICIENCY): ICD-10-CM

## 2018-06-04 DIAGNOSIS — M50.20 HERNIATED CERVICAL DISC: ICD-10-CM

## 2018-06-04 DIAGNOSIS — D69.6 THROMBOCYTOPENIA (HCC): ICD-10-CM

## 2018-06-04 DIAGNOSIS — I48.91 ATRIAL FIBRILLATION WITH RVR (HCC): ICD-10-CM

## 2018-06-04 PROCEDURE — 70551 MRI BRAIN STEM W/O DYE: CPT

## 2018-06-06 ENCOUNTER — HOSPITAL ENCOUNTER (OUTPATIENT)
Dept: INTERVENTIONAL RADIOLOGY/VASCULAR | Age: 63
Discharge: HOME | End: 2018-06-08
Payer: COMMERCIAL

## 2018-06-06 DIAGNOSIS — M50.20 HERNIATED CERVICAL DISC: ICD-10-CM

## 2018-06-06 DIAGNOSIS — I48.91 ATRIAL FIBRILLATION WITH RVR (HCC): ICD-10-CM

## 2018-06-06 DIAGNOSIS — N18.30 CHRONIC RENAL FAILURE, STAGE 3 (MODERATE) (HCC): ICD-10-CM

## 2018-06-06 DIAGNOSIS — I67.82 CHRONIC CEREBRAL ISCHEMIA: ICD-10-CM

## 2018-06-06 DIAGNOSIS — M54.12 CERVICAL RADICULOPATHY: ICD-10-CM

## 2018-06-06 DIAGNOSIS — G45.0 VBI (VERTEBROBASILAR INSUFFICIENCY): ICD-10-CM

## 2018-06-06 DIAGNOSIS — E11.21 CONTROLLED TYPE 2 DIABETES MELLITUS WITH DIABETIC NEPHROPATHY, WITHOUT LONG-TERM CURRENT USE OF INSULIN (HCC): ICD-10-CM

## 2018-06-06 DIAGNOSIS — D69.6 THROMBOCYTOPENIA (HCC): ICD-10-CM

## 2018-06-06 DIAGNOSIS — G63 POLYNEUROPATHY ASSOCIATED WITH UNDERLYING DISEASE (HCC): ICD-10-CM

## 2018-06-06 PROCEDURE — 93880 EXTRACRANIAL BILAT STUDY: CPT

## 2018-06-18 ENCOUNTER — HOSPITAL ENCOUNTER (OUTPATIENT)
Dept: LAB | Age: 63
Setting detail: SPECIMEN
Discharge: HOME | End: 2018-06-18
Payer: COMMERCIAL

## 2018-06-18 DIAGNOSIS — E11.9 CONTROLLED TYPE 2 DIABETES MELLITUS WITHOUT COMPLICATION, WITHOUT LONG-TERM CURRENT USE OF INSULIN (HCC): ICD-10-CM

## 2018-06-18 DIAGNOSIS — E03.8 OTHER SPECIFIED HYPOTHYROIDISM: ICD-10-CM

## 2018-06-18 LAB
ESTIMATED AVERAGE GLUCOSE: 108 MG/DL
HBA1C MFR BLD: 5.4 % (ref 4.8–5.9)
TSH SERPL DL<=0.05 MIU/L-ACNC: 0.55 MIU/L (ref 0.3–5)

## 2018-06-18 PROCEDURE — 84443 ASSAY THYROID STIM HORMONE: CPT

## 2018-06-18 PROCEDURE — 36415 COLL VENOUS BLD VENIPUNCTURE: CPT

## 2018-06-18 PROCEDURE — 83036 HEMOGLOBIN GLYCOSYLATED A1C: CPT

## 2018-06-25 ENCOUNTER — OFFICE VISIT (OUTPATIENT)
Dept: INTERNAL MEDICINE | Age: 63
End: 2018-06-25
Payer: COMMERCIAL

## 2018-06-25 ENCOUNTER — HOSPITAL ENCOUNTER (OUTPATIENT)
Dept: NEUROLOGY | Age: 63
Discharge: HOME | End: 2018-06-25
Payer: COMMERCIAL

## 2018-06-25 VITALS
BODY MASS INDEX: 22.96 KG/M2 | WEIGHT: 155 LBS | HEART RATE: 72 BPM | SYSTOLIC BLOOD PRESSURE: 114 MMHG | DIASTOLIC BLOOD PRESSURE: 62 MMHG | HEIGHT: 69 IN

## 2018-06-25 DIAGNOSIS — R10.9 CHRONIC ABDOMINAL PAIN: ICD-10-CM

## 2018-06-25 DIAGNOSIS — N18.30 CHRONIC RENAL FAILURE, STAGE 3 (MODERATE) (HCC): Primary | ICD-10-CM

## 2018-06-25 DIAGNOSIS — G63 POLYNEUROPATHY ASSOCIATED WITH UNDERLYING DISEASE (HCC): ICD-10-CM

## 2018-06-25 DIAGNOSIS — M79.10 MYALGIA: ICD-10-CM

## 2018-06-25 DIAGNOSIS — M50.20 HERNIATED CERVICAL DISC: ICD-10-CM

## 2018-06-25 DIAGNOSIS — I48.91 ATRIAL FIBRILLATION WITH RVR (HCC): ICD-10-CM

## 2018-06-25 DIAGNOSIS — E78.5 DYSLIPIDEMIA: ICD-10-CM

## 2018-06-25 DIAGNOSIS — F32.9 MAJOR DEPRESSION, CHRONIC: ICD-10-CM

## 2018-06-25 DIAGNOSIS — I10 ESSENTIAL HYPERTENSION: ICD-10-CM

## 2018-06-25 DIAGNOSIS — E55.9 VITAMIN D DEFICIENCY: ICD-10-CM

## 2018-06-25 DIAGNOSIS — E11.21 CONTROLLED TYPE 2 DIABETES MELLITUS WITH DIABETIC NEPHROPATHY, WITHOUT LONG-TERM CURRENT USE OF INSULIN (HCC): ICD-10-CM

## 2018-06-25 DIAGNOSIS — M54.12 CERVICAL RADICULOPATHY: ICD-10-CM

## 2018-06-25 DIAGNOSIS — I34.0 MITRAL VALVE INSUFFICIENCY, UNSPECIFIED ETIOLOGY: ICD-10-CM

## 2018-06-25 DIAGNOSIS — I67.82 CHRONIC CEREBRAL ISCHEMIA: ICD-10-CM

## 2018-06-25 DIAGNOSIS — D69.6 THROMBOCYTOPENIA (HCC): ICD-10-CM

## 2018-06-25 DIAGNOSIS — Z94.4 LIVER TRANSPLANTED (HCC): ICD-10-CM

## 2018-06-25 DIAGNOSIS — E03.9 HYPOTHYROIDISM, UNSPECIFIED TYPE: ICD-10-CM

## 2018-06-25 DIAGNOSIS — G89.29 CHRONIC ABDOMINAL PAIN: ICD-10-CM

## 2018-06-25 DIAGNOSIS — K83.01 PRIMARY SCLEROSING CHOLANGITIS: ICD-10-CM

## 2018-06-25 DIAGNOSIS — G45.0 VBI (VERTEBROBASILAR INSUFFICIENCY): ICD-10-CM

## 2018-06-25 DIAGNOSIS — N82.9 FISTULA INVOLVING FEMALE GENITAL TRACT: ICD-10-CM

## 2018-06-25 DIAGNOSIS — N18.30 CHRONIC RENAL FAILURE, STAGE 3 (MODERATE) (HCC): ICD-10-CM

## 2018-06-25 DIAGNOSIS — M81.0 OSTEOPOROSIS WITHOUT CURRENT PATHOLOGICAL FRACTURE, UNSPECIFIED OSTEOPOROSIS TYPE: ICD-10-CM

## 2018-06-25 PROCEDURE — 99214 OFFICE O/P EST MOD 30 MIN: CPT | Performed by: INTERNAL MEDICINE

## 2018-06-25 PROCEDURE — 93886 INTRACRANIAL COMPLETE STUDY: CPT

## 2018-06-25 PROCEDURE — 93892 TCD EMBOLI DETECT W/O INJ: CPT

## 2018-06-27 ENCOUNTER — HOSPITAL ENCOUNTER (OUTPATIENT)
Dept: NEUROLOGY | Age: 63
Discharge: HOME | End: 2018-06-27
Payer: COMMERCIAL

## 2018-06-27 DIAGNOSIS — M54.12 CERVICAL RADICULOPATHY: ICD-10-CM

## 2018-06-27 DIAGNOSIS — G45.0 VBI (VERTEBROBASILAR INSUFFICIENCY): ICD-10-CM

## 2018-06-27 DIAGNOSIS — M50.20 HERNIATED CERVICAL DISC: ICD-10-CM

## 2018-06-27 DIAGNOSIS — I67.82 CHRONIC CEREBRAL ISCHEMIA: ICD-10-CM

## 2018-06-27 DIAGNOSIS — G63 POLYNEUROPATHY ASSOCIATED WITH UNDERLYING DISEASE (HCC): ICD-10-CM

## 2018-06-27 DIAGNOSIS — N18.30 CHRONIC RENAL FAILURE, STAGE 3 (MODERATE) (HCC): ICD-10-CM

## 2018-06-27 DIAGNOSIS — D69.6 THROMBOCYTOPENIA (HCC): ICD-10-CM

## 2018-06-27 DIAGNOSIS — I48.91 ATRIAL FIBRILLATION WITH RVR (HCC): ICD-10-CM

## 2018-06-27 DIAGNOSIS — E11.21 CONTROLLED TYPE 2 DIABETES MELLITUS WITH DIABETIC NEPHROPATHY, WITHOUT LONG-TERM CURRENT USE OF INSULIN (HCC): ICD-10-CM

## 2018-06-27 PROCEDURE — 95912 NRV CNDJ TEST 11-12 STUDIES: CPT

## 2018-06-27 PROCEDURE — 95886 MUSC TEST DONE W/N TEST COMP: CPT

## 2018-06-28 RX ORDER — LEVOTHYROXINE SODIUM 0.12 MG/1
TABLET ORAL
Qty: 30 TABLET | Refills: 11 | Status: SHIPPED | OUTPATIENT
Start: 2018-06-28 | End: 2019-05-30

## 2018-07-03 DIAGNOSIS — F41.9 ANXIETY: ICD-10-CM

## 2018-07-03 RX ORDER — CLONAZEPAM 1 MG/1
TABLET ORAL
Qty: 90 TABLET | Refills: 0 | Status: SHIPPED | OUTPATIENT
Start: 2018-07-03 | End: 2018-08-03 | Stop reason: SDUPTHER

## 2018-07-23 DIAGNOSIS — F32.9 MAJOR DEPRESSION, CHRONIC: ICD-10-CM

## 2018-07-23 DIAGNOSIS — E11.9 CONTROLLED TYPE 2 DIABETES MELLITUS WITHOUT COMPLICATION, WITHOUT LONG-TERM CURRENT USE OF INSULIN (HCC): ICD-10-CM

## 2018-07-23 RX ORDER — SERTRALINE HYDROCHLORIDE 100 MG/1
TABLET, FILM COATED ORAL
Qty: 90 TABLET | Refills: 3 | Status: SHIPPED | OUTPATIENT
Start: 2018-07-23 | End: 2021-06-30

## 2018-07-30 ENCOUNTER — OFFICE VISIT (OUTPATIENT)
Dept: NEUROLOGY | Age: 63
End: 2018-07-30
Payer: COMMERCIAL

## 2018-07-30 ENCOUNTER — OFFICE VISIT (OUTPATIENT)
Dept: OPHTHALMOLOGY | Age: 63
End: 2018-07-30
Payer: COMMERCIAL

## 2018-07-30 VITALS
SYSTOLIC BLOOD PRESSURE: 126 MMHG | OXYGEN SATURATION: 99 % | DIASTOLIC BLOOD PRESSURE: 70 MMHG | HEIGHT: 69 IN | WEIGHT: 155 LBS | HEART RATE: 70 BPM | BODY MASS INDEX: 22.96 KG/M2

## 2018-07-30 DIAGNOSIS — D69.6 THROMBOCYTOPENIA (HCC): ICD-10-CM

## 2018-07-30 DIAGNOSIS — E03.0 CONGENITAL HYPOTHYROIDISM WITH DIFFUSE GOITER: ICD-10-CM

## 2018-07-30 DIAGNOSIS — Z96.1 PSEUDOPHAKIA OF BOTH EYES: ICD-10-CM

## 2018-07-30 DIAGNOSIS — I48.91 ATRIAL FIBRILLATION WITH RVR (HCC): ICD-10-CM

## 2018-07-30 DIAGNOSIS — H30.93 CHOROIDAL NEOVASCULARIZATION OF BOTH EYES DUE TO CHORIORETINITIS: Primary | ICD-10-CM

## 2018-07-30 DIAGNOSIS — E11.21 CONTROLLED TYPE 2 DIABETES MELLITUS WITH DIABETIC NEPHROPATHY, WITHOUT LONG-TERM CURRENT USE OF INSULIN (HCC): ICD-10-CM

## 2018-07-30 DIAGNOSIS — E11.9 DIABETES TYPE 2, NO OCULAR INVOLVEMENT (HCC): ICD-10-CM

## 2018-07-30 DIAGNOSIS — K83.01 PRIMARY SCLEROSING CHOLANGITIS: ICD-10-CM

## 2018-07-30 DIAGNOSIS — N18.30 CHRONIC RENAL FAILURE, STAGE 3 (MODERATE) (HCC): ICD-10-CM

## 2018-07-30 DIAGNOSIS — I34.0 MITRAL VALVE INSUFFICIENCY, UNSPECIFIED ETIOLOGY: ICD-10-CM

## 2018-07-30 DIAGNOSIS — G56.03 BILATERAL CARPAL TUNNEL SYNDROME: ICD-10-CM

## 2018-07-30 DIAGNOSIS — M54.12 CERVICAL RADICULOPATHY: ICD-10-CM

## 2018-07-30 DIAGNOSIS — H35.053 CHOROIDAL NEOVASCULARIZATION OF BOTH EYES DUE TO CHORIORETINITIS: Primary | ICD-10-CM

## 2018-07-30 DIAGNOSIS — H25.012 CORTICAL AGE-RELATED CATARACT OF LEFT EYE: ICD-10-CM

## 2018-07-30 DIAGNOSIS — F32.9 MAJOR DEPRESSION, CHRONIC: ICD-10-CM

## 2018-07-30 DIAGNOSIS — M50.20 HERNIATED CERVICAL DISC: ICD-10-CM

## 2018-07-30 DIAGNOSIS — Z94.4 LIVER TRANSPLANTED (HCC): ICD-10-CM

## 2018-07-30 DIAGNOSIS — G45.0 VBI (VERTEBROBASILAR INSUFFICIENCY): ICD-10-CM

## 2018-07-30 DIAGNOSIS — I10 ESSENTIAL HYPERTENSION: ICD-10-CM

## 2018-07-30 DIAGNOSIS — E78.5 DYSLIPIDEMIA: ICD-10-CM

## 2018-07-30 DIAGNOSIS — G63 POLYNEUROPATHY ASSOCIATED WITH UNDERLYING DISEASE (HCC): Primary | ICD-10-CM

## 2018-07-30 DIAGNOSIS — I67.82 CHRONIC CEREBRAL ISCHEMIA: ICD-10-CM

## 2018-07-30 PROCEDURE — 99214 OFFICE O/P EST MOD 30 MIN: CPT | Performed by: OPHTHALMOLOGY

## 2018-07-30 PROCEDURE — 99215 OFFICE O/P EST HI 40 MIN: CPT | Performed by: PSYCHIATRY & NEUROLOGY

## 2018-07-30 PROCEDURE — 92250 FUNDUS PHOTOGRAPHY W/I&R: CPT | Performed by: OPHTHALMOLOGY

## 2018-07-30 RX ORDER — PHENYLEPHRINE HCL 2.5 %
1 DROPS OPHTHALMIC (EYE) ONCE
Status: COMPLETED | OUTPATIENT
Start: 2018-07-30 | End: 2018-07-30

## 2018-07-30 RX ORDER — TROPICAMIDE 10 MG/ML
1 SOLUTION/ DROPS OPHTHALMIC ONCE
Status: COMPLETED | OUTPATIENT
Start: 2018-07-30 | End: 2018-07-30

## 2018-07-30 RX ADMIN — TROPICAMIDE 1 DROP: 10 SOLUTION/ DROPS OPHTHALMIC at 16:30

## 2018-07-30 RX ADMIN — Medication 1 DROP: at 16:29

## 2018-07-30 ASSESSMENT — ENCOUNTER SYMPTOMS
CONSTIPATION: 0
SHORTNESS OF BREATH: 0
TROUBLE SWALLOWING: 0
EYE ITCHING: 0
SINUS PRESSURE: 0
EYE DISCHARGE: 0
WHEEZING: 0
BLOOD IN STOOL: 0
BACK PAIN: 0
VOMITING: 0
EYE PAIN: 0
FACIAL SWELLING: 0
BOWEL INCONTINENCE: 0
ABDOMINAL PAIN: 0
PHOTOPHOBIA: 0
COLOR CHANGE: 0
CHEST TIGHTNESS: 0
ABDOMINAL DISTENTION: 0
APNEA: 0
SORE THROAT: 0
COUGH: 0
VOICE CHANGE: 0
CHOKING: 0
EYE REDNESS: 0
DIARRHEA: 0
VISUAL CHANGE: 0
NAUSEA: 0

## 2018-07-30 ASSESSMENT — VISUAL ACUITY
METHOD: SNELLEN - LINEAR
CORRECTION_TYPE: GLASSES
OS_CC: 20/20
OD_CC+: 2

## 2018-07-30 ASSESSMENT — TONOMETRY
IOP_METHOD: NON-CONTACT AIR PUFF
OD_IOP_MMHG: 12
OS_IOP_MMHG: 14

## 2018-07-30 ASSESSMENT — SLIT LAMP EXAM - LIDS
COMMENTS: MILD BLEPHARITIS. MILD MGD
COMMENTS: MILD BLEPHARITIS. MILD MGD

## 2018-07-30 NOTE — PROGRESS NOTES
[facility]  Neurology  [address] [address]  Phone: [phone]   Fax: [phone]    SUBJECTIVE:     PATIENT ID:  Olya Schwartz is a  RIGHT HANDED 63 y.o. female. Neck Pain    This is a chronic problem. Episode onset: MORE  THAN   20  YEARS. The problem occurs intermittently. The problem has been unchanged. The pain is associated with an unknown factor. The pain is present in the midline. The quality of the pain is described as aching and cramping. The pain is at a severity of 3/10. The pain is mild. The symptoms are aggravated by position. The pain is same all the time. Stiffness is present all day. Associated symptoms include leg pain, numbness and tingling. Pertinent negatives include no chest pain, fever, headaches, pain with swallowing, paresis, photophobia, syncope, trouble swallowing, visual change, weakness or weight loss. She has tried neck support, acetaminophen and oral narcotics for the symptoms. The treatment provided mild relief. Neurologic Problem   The patient's primary symptoms include clumsiness, focal sensory loss, a loss of balance and memory loss. The patient's pertinent negatives include no altered mental status, focal weakness, near-syncope, slurred speech, syncope, visual change or weakness. This is a chronic problem. Episode onset: MORE  THAN   2-3  YEARS. The neurological problem developed insidiously. The problem is unchanged. There was lower extremity, right-sided, left-sided and upper extremity focality noted. Associated symptoms include dizziness, light-headedness and neck pain. Pertinent negatives include no abdominal pain, auditory change, aura, back pain, bladder incontinence, bowel incontinence, chest pain, confusion, diaphoresis, fatigue, fever, headaches, nausea, palpitations, shortness of breath, vertigo or vomiting. Past treatments include sleep and medication. The treatment provided mild relief.  Her past medical history is significant for dementia and liver disease. There is no history of a bleeding disorder, a clotting disorder, a CVA, head trauma, mood changes or seizures. History obtained from  The patient and   and other  available medical records were  Also  reviewed. The  Duration,  Quality,  Severity,  Location,  Timing,  Context,  Modifying  Factors   Of   The   Chief   Complaint   And  Present  Illness   Was   Reviewed   In   Chronological   Manner. CURRENT PATIENT'S  MAIN  CONCERNS INCLUDE :                     1)  H/O    RIGHT  UPPER  EXTREMITY  NUMBNESS   SINCE   April 2018                                   FOR  ONE   MONTH     -   RESOLVED                                 NO  RECURRENCE  OF  THE  SAME. 2)    D/D   CEREBRAL  ISCHEMIA,    CERVICAL   RADICULOPATHY,                                     MONONEUROPATHIES                     3)     H/O   CHRONIC   NECK PAIN     FOR  MORE  THAN  20  YEARS                    4)     H/O CHRONIC   BALANCE  PROBLEMS                                  NO  H/O   FALLING                        5)    H/O   DIET  CONTROLLED    TYPE  II  DM     -     STABLE                              PERIPHERAL  POLYNEUROPATHY   MORE  SO   BOTH  LOWER  EXTREMITIES                               -      STABLE   /       ON  NEURONTIN                     6)     H/O   DIZZINESS   WITH  NECK   EXTENSION    AND MOVEMENTS                                             INTERMITTENTLY                            -     CEREBRAL  ISCHEMIA ,    VERTEBROBASILAR INSUFFICIENCY                     7)    MULTIPLE  CO  MORBID  MEDICAL  CONDITIONS                                   BEING  FOLLOWED  BY  HER  PCP.                     8)     H/O    LIVER  TRANSPLANT     AT  [facility]    2004                      9)     H/O     CHRONIC  ABDOMINAL   SURGERIES    AND   CHRONIC   PAIN                                       ON  NARCOTIC  MEDICATIONS  FROM PAIN  MANAGEMENT                            10)  MRI  C  SPINE  IN   April 2018   SHOWED  LOWER  CERVICAL  DISC  DISEASE.                        11)    CHRONIC    MILD  CERVICOGENIC  TENSION  HEADACHES                                     INTERMITTENTLY. -   STABLE                       12)  H/O    MILD  MEMORY PROBLEMS                                 PATIENT  NOT  CONCERNED. 15)    H/O   ANXIETY,  DEPRESSION     -    STABLE                                        ON  ZOLOFT                                        14)      IN  VIEW  OF  THE  PATIENT'S    MULTIPLE   NEUROLOGICAL                           SYMPTOMS  AND  CONCERNS    FOR  PROLONGED   DURATION,                           AND    MULTIPLE   CO MORBID  MEDICAL  CONDITIONS,                           PATIENT    HAD    NEURO  DIAGNOSTIC  EVALUATIONS                                 MRI  BRAIN  AND  CAROTID  DOPPLER  IN  June 2018                                     SHOWED  NO  SIGNIFICANT   ABNORMALITIES                                          15)    PATIENT  MAY    BENEFIT  FROM   ANTI  PLATELET  THERAPY                                 PROPHYLACTIC ALLY. PATIENT   HAS    H/O      ALLERGY  TO    ASA.                                     PLAVIX   WAS  DEFERRED     IN  VIEW  OF  HER  THROMBOCYTOPENIA          AND                                      H/O   EXCESSIVE  BRUISING     SINCE  HER  LIVER  TRANSPLANT   SINCE  2004                                                                              PRECIPITATING  FACTORS: including  fever/infection, exertion/relaxation, position change, stress, weather change, medications/alcohol, time of day/darkness/light  Are    absent                                                             MODIFYING  FACTORS:  fever/infection, exertion/relaxation, position change, stress, weather change, medications/alcohol, time of day/darkness/light     Are  absent         Patient   Indicates   The  Presence   And  The  Absence  Of  The  Following  Associated  And   Additional  Neurological    Symptoms:                                Balance  And coordination problems  present           Gait problems     absent            Headaches      present              Migraines           absent           Memory problems        present           Confusion        absent            Paresthesia numbness          absent           Seizures  And  Starring  Episodes           absent           Syncope,  Near  syncopal episodes         absent           Speech problems           absent             Swallowing  Problems      absent            Dizziness,  Light headedness           present                        Vertigo        absent             Generalized   Weakness    present              focal  Weakness     absent             Tremors         absent              Sleep  Problems     absent             History  Of   Recent   Head  Injury     absent             History  Of   Recent  TIA     absent             History  Of   Recent    Stroke     absent             Neck  Pain and  Neck muscle  Spasms  present             Radiating  down   And   Weakness           present            Lower back   Pain  And     Spasms  absent            Radiating    Down   And   Weakness          absent                H/O   FALLS        absent               History  Of   Visual  Symptoms    absent                Associated   Diplopia       absent                                Also   Additional   Symptoms   Present    As  Documented    In   The detailed    Review  Of  Systems   And    Please   Refer   To    Them for   Additional  Information. Any components  That are either  Unobtainable  Or  Limited  In   HPI, ROS  And/or PFSH   Are   Due   To   Patient's  Medical  Problems,  Clinical  Condition and/or lack of other  Alternate resources.           RECORDS REVIEWED:  historical medical records     INFORMATION   REVIEWED:     MEDICAL   HISTORY,     SURGICAL   HISTORY,   MEDICATIONS   LIST,   ALLERGIES AND  DRUG  INTOLERANCES,     FAMILY   HISTORY,  SOCIAL  HISTORY,    PROBLEM  LIST   FOR  PATIENT  CARE   COORDINATION    Past Medical History:   Diagnosis Date    Anxiety     Breast disease     History of fibroadenomatous breast disease with biopsy x 2.    Choroidal neovascularization     Peripapillary with hemorrhage, Dr. Ruthie Call, 2012    Chronic abdominal pain     [place] has attributed to pouchitis and possible adhesions. Extensive repeated eval.  EGD 9/13. dr Nolan Perea also following  Repeat egd 11/28/14 and , Pouchoscopy 11/28/14 Dr Enmanuel Graham  EGD- patchy gastritis, repeat EGD 4/16 Dr Valeria Liu 9/15 [facility]    Chronic kidney disease     1.)Creatine clearance 58 05/06 and total urine protein 405. 2.)Dr. Manuelito Holcomb, [address] [place] [address], felt this was secondary to Prograf and rapamune 6/06, 3.)history of hyperkalemia leading to discontinuatuin of ACE inhibitor. 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 Eval Dr. [facility] 2013 recommended watchful waiting. 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  GERD (gastroesophageal reflux disease)     EGD 9/13. dr Nolan Perea also following  Repeat egd 11/28/14 and ,  EGD- patchy gastritis, repeat EGD 4/16 Dr Back Pill Headache(784.0)     Herniated cervical disc 03/2009    C6-C7 with compression of the thecal sac most significantly on MRI. 1.1) Dr. Yonathan almanza recommended a 2nd opinion from neurology. 1.2) Had a 2nd opinion for Dr. Louise Welch who referred to a neurosurgeon in [facility] [place] for another opinion.  1.3)  That surgeon, Dr. Allie Davis felt surgery was not 1.2) Repeat pouchoscopy/colonoscopy, 06, Dr. Rubina Little, with evidence of pouchitis. 1.3) Repeat colonoscopy, Dr. Deshawn Padilla, . 1.4) Repeat colonoscopy , Dr. Craig Tapia. 1.5) Repeat colonoscopy, Dr. Craig Tapia .  Vitamin D deficiency          Past Surgical History:   Procedure Laterality Date    ABDOMINAL HERNIA REPAIR      With extensive revision of scar from the  procedure, recurrent ventral hernia repair     APPENDECTOMY      ATRIAL ABLATION SURGERY  , 12/10/14    had it done twice.  BREAST BIOPSY Left     BREAST SURGERY      Biopsy x2    CATARACT REMOVAL WITH IMPLANT Right 2017    [facility]    CATARACT REMOVAL WITH IMPLANT Left 2017    [facility]     SECTION      three times    CHOLECYSTECTOMY      COLON SURGERY      total colectomy and colostomy with reversal    COLONOSCOPY      EYE SURGERY      laser done on both eyes    HERNIA REPAIR  2014, revision     Dr Sharri Cote. Clinic. Also took done adhesions and removed old sutures. Reexploration     LIVER BIOPSY      LIVER TRANSPLANT      [facility]    CT REMV CATARACT Gib [place]berry LENS Right 2017    Right Phaco w/ IOL  performed by Елена Clay MD at [address] [facility] Left 2017    Left Phaco w/ IOL topical performed by Елена Clay MD at [address] [facility] Bilateral     [facility]    UPPER GASTROINTESTINAL ENDOSCOPY      VENTRAL HERNIA REPAIR      Recurrent repair, plastic surgery, [facility]. Also with extensive lysis of adhesions. Current Outpatient Prescriptions   Medication Sig Dispense Refill    sertraline (ZOLOFT) 100 MG tablet TAKE 1 TABLET DAILY  ALONG WITH A 50 MG TABLET (TOTAL  MG DAILY) 90 tablet 3    clonazePAM (KLONOPIN) 1 MG tablet TAKE 1 TABLET BY MOUTH THREE TIMES DAILY AS NEEDED.  90 tablet 0    levothyroxine (SYNTHROID) 125 MCG tablet take 1 tablet by mouth once daily 30 tablet 11    raloxifene (EVISTA) 60 MG tablet TAKE 1 TABLET DAILY 90 tablet 3    sulfamethoxazole-trimethoprim (BACTRIM DS;SEPTRA DS) 800-160 MG per tablet TAKE ONE-HALF (1/2) TABLET 5 DAYS A WEEK ( MONDAY THROUGH FRIDAY ) 30 tablet 3    gabapentin (NEURONTIN) 800 MG tablet TAKE 1 TABLET THREE TIMES A  tablet 3    omeprazole (PRILOSEC) 20 MG delayed release capsule TAKE 1 CAPSULE TWICE A  capsule 3    famotidine (PEPCID) 40 MG tablet TAKE 1 TABLET NIGHTLY AS NEEDED 90 tablet 3    tinidazole (TINDAMAX) 250 MG tablet Take 250 mg by mouth daily      HYDROmorphone (DILAUDID) 2 MG tablet Take 1 tablet by mouth 3 times daily as needed for Pain . 90 tablet 0    atorvastatin (LIPITOR) 10 MG tablet Take 1 tablet by mouth daily 30 tablet 11    sertraline (ZOLOFT) 50 MG tablet Take 1 tablet by mouth daily Take along with zoloft 100 mg daily (total 150 mg daily) 90 tablet 3    ondansetron (ZOFRAN ODT) 4 MG disintegrating tablet Take 1 tablet by mouth every 8 hours as needed for Nausea 20 tablet 0    glucose blood VI test strips (ASCENSIA AUTODISC VI;ONE TOUCH ULTRA TEST VI) strip One touch ultra test strips. Test up to 4 times a day. 100 each 11    calcium carbonate (OSCAL) 500 MG TABS tablet Take 500 mg by mouth 2 times daily      PROGRAF 1 MG capsule Take 1 mg by mouth 2 times daily.  Cholecalciferol (VITAMIN D3)   Take 1,000 Int'l Units by mouth daily        No current facility-administered medications for this visit.           Allergies   Allergen Reactions    Codeine Shortness Of Breath    Tylenol With Codeine #3 [Acetaminophen-Codeine] Shortness Of Breath    Vicodin [Hydrocodone-Acetaminophen] Shortness Of Breath     Makes her feel crazy    Ace Inhibitors Other (See Comments)     Cough    Aspirin      Makes her heart race      Azulfidine [Sulfasalazine] Hives    Laura-D [Diphenhydramine] Other (See Comments)    Elmiron [Pentosan Polysulfate Sodium] Other (See Comments)     bruising    Fentanyl      Injection only    Medrol [Methylprednisolone]      Tolerates prednisone okay    Makes her stomach hurt. She also stated that Prednisone makes her sick.      Morphine Hives    Other      Mycins (not to have per liver transplant team)    Reglan [Metoclopramide]      Medical induced parkinsons    Tramadol      Makes her feel crazy    Phenergan [Promethazine]      Restless leg         Family History   Problem Relation Age of Onset    Heart Disease Mother     COPD Mother     Diabetes Father     Cirrhosis Other         related to drinking    Cataracts Neg Hx     Glaucoma Neg Hx          Social History     Social History    Marital status:      Spouse name: alex    Number of children: 2    Years of education: N/A     Occupational History    unemployed      Social History Main Topics    Smoking status: Former Smoker     Packs/day: 0.80     Years: 2.00     Quit date: 1/1/1976    Smokeless tobacco: Never Used    Alcohol use No    Drug use: No    Sexual activity: Yes     Partners: Male     Other Topics Concern    Not on file     Social History Narrative    No narrative on file       Vitals:    07/30/18 1534   BP: 126/70   Pulse: 70   SpO2: 99%         Wt Readings from Last 3 Encounters:   07/30/18 155 lb (70.3 kg)   06/25/18 155 lb (70.3 kg)   05/24/18 156 lb 12.8 oz (71.1 kg)         BP Readings from Last 3 Encounters:   07/30/18 126/70   06/25/18 114/62   05/24/18 136/64       Hematology and Coagulation  Lab Results   Component Value Date    WBC 5.2 03/27/2018    RBC 4.13 03/27/2018    HGB 12.4 03/27/2018    HCT 36.8 03/27/2018    MCV 89.1 03/27/2018    MCH 30.0 03/27/2018    MCHC 33.6 03/27/2018    RDW 15.1 03/27/2018     03/27/2018    MPV 7.8 03/27/2018       Chemistries  Lab Results   Component Value Date     03/27/2018    K 4.2 03/27/2018     03/27/2018    CO2 31 03/27/2018    BUN 27 Muscle  Tone  In upper  And  Lower  Extremities  normal              No rigidity. No  Spasticity. Bradykinesia   absent               No  Asterixis. Sustention  Tremor , Resting  Tremor   absent                  No other  Abnormal  Movements noted         D) SENSORY :             light touch, pinprick, position  And  Vibration IMPAIRED  MORE  SO  BOTH   LOWER  EXTREMITIES      E) REFLEXES:                 Deep  Tendon  Reflexes   DECREASED                  No pathological  Reflexes  Bilaterally.                                 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    Encounter for long-term (current) use of other medications    Essential hypertension    Hypothyroidism    Chronic renal failure, stage 3 (moderate)    Diabetes type 2, controlled (HCC)    Major depression, chronic    Cataract, nuclear sclerotic, right eye    Encounter for chronic pain management    Cortical age-related cataract of left eye    Herniated cervical disc    Chronic cerebral ischemia    VBI (vertebrobasilar insufficiency)    Cervical radiculopathy    Bilateral carpal tunnel syndrome     MRI OF THE BRAIN WITHOUT CONTRAST  6/4/2018 3:59 pm       TECHNIQUE:   Multiplanar multisequence MRI of the brain was performed without the   administration of intravenous contrast.       COMPARISON:   May 1, 2015       HISTORY:   ORDERING SYSTEM PROVIDED HISTORY: Atrial fibrillation with RVR [facility])   TECHNOLOGIST PROVIDED HISTORY:   Ordering Physician Provided Reason for Exam: Balance problems and numbness of   right arm. Symptoms for a couple of months or so. No contrast given to   patient due to GFR of 28. Acuity: Chronic   Type of Exam: Initial   Additional signs and symptoms: Chronic cerebral ischemia       FINDINGS:   INTRACRANIAL STRUCTURES/VENTRICLES: There is no acute infarct.  There are 1-2   small foci of abnormal signal noted on ADC and exponential ADC only.  This is   most likely artifactual.  The formal ADC map appears normal.  No mass effect   or midline shift.  No evidence of an acute intracranial hemorrhage.  The   ventricles and sulci are normal in size and configuration.  The   sellar/suprasellar regions appear unremarkable.  The normal signal voids   within the major intracranial vessels appear maintained.       ORBITS: The visualized portion of the orbits demonstrate no acute abnormality.       SINUSES: The visualized paranasal sinuses and mastoid air cells are well   aerated.       BONES/SOFT TISSUES: The bone marrow signal intensity appears normal. The soft   tissues demonstrate no acute abnormality.           Impression   No definite acute infarct.  Probable artifactual signal abnormality noted   left parietal white matter.  Follow-up MRI in 1-2 weeks may be helpful if   clinically warranted.         ULTRASOUND EVALUATION OF THE CAROTID ARTERIES       6/6/2018       COMPARISON:   None.       HISTORY:   ORDERING SYSTEM PROVIDED HISTORY: Atrial fibrillation with RVR ([address]   TECHNOLOGIST PROVIDED HISTORY:   Ordering Physician Provided Reason for Exam: chronic cerebral ischemia, vbi   Acuity: Acute   Type of Exam: Initial       FINDINGS:   RIGHT:       The right common carotid artery demonstrates peak systolic velocities of 90   cm/sec in the proximal and distal segments respectively.       The right internal carotid artery demonstrates the systolic velocities of RADICULOPATHY  AND  PLEXOPATHY      *   PERIPHERAL  NEUROPATHY,   NEUROPATHIC  PAIN         *     BALANCE PROBLMES   AND  FALL            VARIOUS  RISK   FACTORS   WERE  REVIEWED   AND   DISCUSSED. *  PATIENT   HAS  MULTIPLE   MEDICAL, MENTAL HEALTH       NEUROLOGICAL   PROBLEMS . PATIENT'S   MANAGEMENT  IS  CHALLENGING. PLAN:       Ramón Foy  Of  The  Diagnoses,  The  Management & Treatment  Options           AND    Care  plan  Were        Reviewed and   Discussed   With  patient and spouse. * Goals  And  Expectations  Of  The  Therapy  Discussed   And  Reviewed. *   Benefits   And   Side  Effect  Profile  Of  Medication/s   Were   Discussed             * Need   For  Further   Follow up For  The  Various  Problems  Were discussed. * Results  Of  The  Previous  Diagnostic tests were reviewed and questions answered. patient and spouse  understand the same. Medical  Decision  Making  Was  Made  Based on the   Complexity  Of  Above  Mentioned  Diagnoses,    Data reviewed   & diagnostic  Tests  Reviewed,  Risk  Of  Significant   Co morbidities and complicating   Factors. Medical  Decision  Was   High  Complexity  Due   To  The  Patient's  Multiple  Symptoms,  Advancing   Disease,  Complex  Treatment  Regimen,  Multiple medications and   Risk  Of   Side  Effects,  Difficulty  In  Medication  Management  And  Diagnostic  Challenges   In  View  Of  The  Associated   Co  Morbid  Conditions   And  Problems. * FALL   PRECAUTIONS. THESE  REVIEWED   AND  DISCUSSED    *   BE  CAREFUL  WITH  ACTIVITIES   INCLUDING  DRIVING. *   AVOID   NECK  AND/ BACK  STRAINING  ACTIVITIES    *   ADEQUATE   FLUID  INTAKE   AND  ELECTROLYTE  BALANCE         * KEEP  DAIRY  OF   THE  NEUROLOGICAL  SYMPTOMS      RECORDING THE    DURATION  AND  FREQUENCY. *  AVOID    CONDITIONS  AND  FACTORS   THAT  MAKE   NEUROLOGICAL  SYMPTOMS  WORSE.              * Profile,   Blood  Sugar levels  And   Heart  Disease. *   Period   Screening  For  Cancers  Involving  Breast,  Colon,  , lungs  And  Other  Organs  As  Applicable,  In consultation   With  Your  Primary Care Providers. * Second  Neurological  Opinion  And  Evaluations  In  [facility]  Setting  If  Patient  Is  Interested. *  If  The  Patient remains  Neurologically  Stable   Return   To  [facility] Neurology Department   IN   3-6     MONTHS  TIME   FOR  FURTHER  FOLLOW UP.                 *  If   There is  Any  Significant  Worsening   Of  Current  Symptoms  And  Or  If patient  Develops   Any additional  New  Neurological  Symptoms  Or  Significant  Concerns   Should  Call  911 or  Go  To  Emergency  Department  For  Further  Immediate  Evaluation. *   The  Neurological   Findings,  Possible  Diagnosis,  Differential diagnoses   And  Options  For    Further   Investigations   And  management   Are  Discussed  Comprehensively. Medications   And  Prescription   Risks  And  Side effects  Are   Also  Discussed. The  Above  Were  Reviewed  With  patient and spouse and   questions  Answered  In  Detail. More   Than   50% of face  To face Time   Was  Spent  On  Counseling   And   Coordination  Of  Care   Of   Patient's multiple   Neurological  Problems   And   Comorbid  Medical   Conditions. Electronically signed by Elvin Horton MD   Board Certified in  Neurology &  In  [facility] [employer] ([employer])      DISCLAIMER:   Although every effort was made to ensure the accuracy of this  electronic transcription, some errors in transcription may have occurred.       GENERAL PATIENT INSTRUCTIONS:     A Healthy Lifestyle: Care Instructions  Your Care Instructions  A healthy lifestyle can help you feel good, stay at a healthy weight, and have plenty of energy for both

## 2018-08-03 DIAGNOSIS — F41.9 ANXIETY: ICD-10-CM

## 2018-08-04 RX ORDER — CLONAZEPAM 1 MG/1
TABLET ORAL
Qty: 90 TABLET | Refills: 0 | Status: SHIPPED | OUTPATIENT
Start: 2018-08-04 | End: 2018-09-05 | Stop reason: SDUPTHER

## 2018-08-08 ENCOUNTER — OFFICE VISIT (OUTPATIENT)
Dept: OPHTHALMOLOGY | Age: 63
End: 2018-08-08
Payer: COMMERCIAL

## 2018-08-08 DIAGNOSIS — H30.93 CHOROIDAL NEOVASCULARIZATION OF BOTH EYES DUE TO CHORIORETINITIS: Primary | ICD-10-CM

## 2018-08-08 DIAGNOSIS — H35.053 CHOROIDAL NEOVASCULARIZATION OF BOTH EYES DUE TO CHORIORETINITIS: Primary | ICD-10-CM

## 2018-08-08 DIAGNOSIS — E11.9 DIABETES TYPE 2, NO OCULAR INVOLVEMENT (HCC): ICD-10-CM

## 2018-08-08 PROCEDURE — 99214 OFFICE O/P EST MOD 30 MIN: CPT | Performed by: OPHTHALMOLOGY

## 2018-08-08 PROCEDURE — 92134 CPTRZ OPH DX IMG PST SGM RTA: CPT | Performed by: OPHTHALMOLOGY

## 2018-08-08 ASSESSMENT — SLIT LAMP EXAM - LIDS
COMMENTS: MILD BLEPHARITIS. MILD MGD
COMMENTS: MILD BLEPHARITIS. MILD MGD

## 2018-08-08 ASSESSMENT — VISUAL ACUITY
METHOD: SNELLEN - LINEAR
OS_CC: 20/20

## 2018-08-08 ASSESSMENT — TONOMETRY
OS_IOP_MMHG: 15
OD_IOP_MMHG: 12
IOP_METHOD: NON-CONTACT AIR PUFF

## 2018-08-09 ENCOUNTER — OFFICE VISIT (OUTPATIENT)
Dept: PAIN MANAGEMENT | Age: 63
End: 2018-08-09
Payer: COMMERCIAL

## 2018-08-09 VITALS
HEIGHT: 71 IN | DIASTOLIC BLOOD PRESSURE: 78 MMHG | OXYGEN SATURATION: 98 % | HEART RATE: 80 BPM | WEIGHT: 156 LBS | RESPIRATION RATE: 16 BRPM | BODY MASS INDEX: 21.84 KG/M2 | SYSTOLIC BLOOD PRESSURE: 120 MMHG

## 2018-08-09 DIAGNOSIS — G89.29 CHRONIC ABDOMINAL PAIN: ICD-10-CM

## 2018-08-09 DIAGNOSIS — G89.29 ENCOUNTER FOR CHRONIC PAIN MANAGEMENT: Primary | ICD-10-CM

## 2018-08-09 DIAGNOSIS — R10.9 CHRONIC ABDOMINAL PAIN: ICD-10-CM

## 2018-08-09 PROCEDURE — 99213 OFFICE O/P EST LOW 20 MIN: CPT | Performed by: NURSE PRACTITIONER

## 2018-08-09 RX ORDER — HYDROMORPHONE HYDROCHLORIDE 2 MG/1
2 TABLET ORAL EVERY 12 HOURS PRN
Qty: 60 TABLET | Refills: 0 | Status: SHIPPED | OUTPATIENT
Start: 2018-08-09 | End: 2018-09-08

## 2018-08-09 ASSESSMENT — ENCOUNTER SYMPTOMS
DIARRHEA: 1
ABDOMINAL PAIN: 1
CONSTIPATION: 0
NAUSEA: 1
VOMITING: 0

## 2018-08-09 NOTE — PROGRESS NOTES
Subjective:      Patient ID: Dary Armas is a 63 y.o. female. Chief Complaint   Patient presents with    Abdominal Pain     HPI   Here today for routine pain clinic recheck. Pain has recently increased over the past couple weeks, overall doing failry well. Dilaudid 2mg prn, does not take on daily basis. Pain Assessment  Location of Pain: Other (Comment) (stomach )  Location Modifiers: Left, Right  Severity of Pain: 5  Quality of Pain: Aching  Duration of Pain: Persistent  Frequency of Pain: Constant  Aggravating Factors: Bending, Standing, Walking  Limiting Behavior: Yes  Relieving Factors: Rest  Result of Injury: No  Work-Related Injury: No  Are there other pain locations you wish to document?: No    Allergies   Allergen Reactions    Codeine Shortness Of Breath    Tylenol With Codeine #3 [Acetaminophen-Codeine] Shortness Of Breath    Vicodin [Hydrocodone-Acetaminophen] Shortness Of Breath     Makes her feel crazy    Ace Inhibitors Other (See Comments)     Cough    Aspirin      Makes her heart race      Azulfidine [Sulfasalazine] Hives    Laura-D [Diphenhydramine] Other (See Comments)    Elmiron [Pentosan Polysulfate Sodium] Other (See Comments)     bruising    Fentanyl      Injection only    Medrol [Methylprednisolone]      Tolerates prednisone okay    Makes her stomach hurt. She also stated that Prednisone makes her sick.  Morphine Hives    Other      Mycins (not to have per liver transplant team)    Reglan [Metoclopramide]      Medical induced parkinsons    Tramadol      Makes her feel crazy    Phenergan [Promethazine]      Restless leg       Outpatient Prescriptions Marked as Taking for the 8/9/18 encounter (Office Visit) with HALINA Holm - CNP   Medication Sig Dispense Refill    HYDROmorphone (DILAUDID) 2 MG tablet Take 1 tablet by mouth every 12 hours as needed for Pain for up to 30 days. . 60 tablet 0    clonazePAM (KLONOPIN) 1 MG tablet TAKE 1 TABLET BY MOUTH THREE  Migraine headache     evaluation, Dr Bessy Cavazos, [facility] Mitral valve regurgitation     mod on echo 9/14, & 4/16, stable 8/17    Osteoporosis     1.) DEXA, 03/05, T -1.1 spine, T -3.1 hip 2.) DEXA, 12/07, T -0.8 spine, T -1.9 spine 3.) DEXA, 03/10, T -0.2 spine, T -1.7 hip  4) DEXA 7/15 FRAX 6.7% 10 yr risk hip. [facility] prefers no Prolia due to infection risk, and GFR 30    Peripheral neuropathy     emg 2014- severe    Pouchitis ([address] 2006    Versus small bowel bacterial overgrowth, Dr. Bowden [facility]. 1.1) [facility] 8/07 for 12 days with dx being pouchitis. 1.2) panniculitis, 8/07, [facility]. 1.3) Repeat scope, Dr. Marcellus Felipe, done on pouch 6/11 being okay. Repeat pouchoscopy 9/13& 4/16 [facility]    Primary sclerosing cholangitis     1.1) Following with liver transplant team at [facility] and with Dr. Gerardo Jorge, gastroenterologist at [facility]. 1.2) Multiple stent replacements in the biliary tract for this about every 6 months. 1.3) Liver transplant, 11/04. Dr Bessie Braun, Dr Nilo Mccracken, Transplant coordinator for labs etc. 1.4) Liver biopsy, 6/06.  SVT (supraventricular tachycardia) ([address]     ablated [facility] 2004    Ulcerative colitis ([address]     Status post colectomy. 1.1) Colonoscopy 1/04, Dr. Roberto Calvert, with evidence of marked chronic inflammatory changes. 1.2) Repeat pouchoscopy/colonoscopy, 1/4/06, Dr. Roberto Calvert, with evidence of pouchitis. 1.3) Repeat colonoscopy, Dr. Mitchell Oreilly, 6/06. 1.4) Repeat colonoscopy 8/09, Dr. Marcellus Felipe. 1.5) Repeat colonoscopy, Dr. Marcellus Felipe 04/12.  Vitamin D deficiency        Past Surgical History:   Procedure Laterality Date    ABDOMINAL HERNIA REPAIR  08/05    With extensive revision of scar from the 11/04 procedure, recurrent ventral hernia repair 09/06    APPENDECTOMY      ATRIAL ABLATION SURGERY  2004, 12/10/14    had it done twice.      BREAST BIOPSY Left 9-2014    BREAST SURGERY Positive for weakness and numbness. Psychiatric/Behavioral: Positive for sleep disturbance. The patient is nervous/anxious (takes Klonopin BID). Objective:   Physical Exam   Constitutional: She is oriented to person, place, and time. Vital signs are normal. She appears well-developed and well-nourished. She is active and cooperative. Non-toxic appearance. She does not have a sickly appearance. She does not appear ill. No distress. HENT:   Head: Normocephalic and atraumatic. Right Ear: External ear normal.   Left Ear: External ear normal.   Cardiovascular: Normal rate. Pulmonary/Chest: Effort normal. No accessory muscle usage. No respiratory distress. No audible wheezing   Neurological: She is alert and oriented to person, place, and time. She is not disoriented. No cranial nerve deficit. She displays no seizure activity. Coordination normal. GCS eye subscore is 4. GCS verbal subscore is 5. GCS motor subscore is 6. Skin: Skin is warm, dry and intact. She is not diaphoretic. No cyanosis. There is pallor. Nails show no clubbing. Psychiatric: She has a normal mood and affect. Her speech is normal. Judgment normal. Her mood appears not anxious. Her affect is not angry. She is not agitated and not aggressive. Vitals reviewed. Assessment:      1. Encounter for chronic pain management    2. Chronic abdominal pain          Plan:     Continue current pain medications to improve quality of life and function.  Follow up 3 months

## 2018-08-28 DIAGNOSIS — F32.9 MAJOR DEPRESSION, CHRONIC: ICD-10-CM

## 2018-08-28 NOTE — TELEPHONE ENCOUNTER
Pt called requesting a 30 day script for Zoloft 50 mg tabs to go to [facility] in [place] and is also requesting that all other scripts to go [facility]. Please call Pt with any questions or concerns.    Last Appt:  6/25/2018  Next Appt:   9/24/2018  Med verified in Epic

## 2018-09-05 DIAGNOSIS — F41.9 ANXIETY: ICD-10-CM

## 2018-09-05 RX ORDER — CLONAZEPAM 1 MG/1
TABLET ORAL
Qty: 90 TABLET | Refills: 0 | Status: SHIPPED | OUTPATIENT
Start: 2018-09-05 | End: 2018-10-08 | Stop reason: SDUPTHER

## 2018-09-24 ENCOUNTER — OFFICE VISIT (OUTPATIENT)
Dept: INTERNAL MEDICINE | Age: 63
End: 2018-09-24
Payer: COMMERCIAL

## 2018-09-24 ENCOUNTER — HOSPITAL ENCOUNTER (OUTPATIENT)
Dept: LAB | Age: 63
Setting detail: SPECIMEN
Discharge: HOME | End: 2018-09-24
Payer: COMMERCIAL

## 2018-09-24 VITALS
DIASTOLIC BLOOD PRESSURE: 66 MMHG | WEIGHT: 157 LBS | BODY MASS INDEX: 23.25 KG/M2 | HEIGHT: 69 IN | SYSTOLIC BLOOD PRESSURE: 122 MMHG | HEART RATE: 84 BPM

## 2018-09-24 DIAGNOSIS — G89.29 CHRONIC ABDOMINAL PAIN: ICD-10-CM

## 2018-09-24 DIAGNOSIS — I48.91 ATRIAL FIBRILLATION WITH RVR (HCC): ICD-10-CM

## 2018-09-24 DIAGNOSIS — I10 ESSENTIAL HYPERTENSION: ICD-10-CM

## 2018-09-24 DIAGNOSIS — E78.5 DYSLIPIDEMIA: ICD-10-CM

## 2018-09-24 DIAGNOSIS — E55.9 VITAMIN D DEFICIENCY: ICD-10-CM

## 2018-09-24 DIAGNOSIS — K83.01 PRIMARY SCLEROSING CHOLANGITIS: ICD-10-CM

## 2018-09-24 DIAGNOSIS — I34.0 MITRAL VALVE INSUFFICIENCY, UNSPECIFIED ETIOLOGY: ICD-10-CM

## 2018-09-24 DIAGNOSIS — R10.9 CHRONIC ABDOMINAL PAIN: ICD-10-CM

## 2018-09-24 DIAGNOSIS — Z94.4 LIVER TRANSPLANTED (HCC): ICD-10-CM

## 2018-09-24 DIAGNOSIS — E11.21 CONTROLLED TYPE 2 DIABETES MELLITUS WITH DIABETIC NEPHROPATHY, WITHOUT LONG-TERM CURRENT USE OF INSULIN (HCC): ICD-10-CM

## 2018-09-24 DIAGNOSIS — D69.6 THROMBOCYTOPENIA (HCC): ICD-10-CM

## 2018-09-24 DIAGNOSIS — E03.9 HYPOTHYROIDISM, UNSPECIFIED TYPE: ICD-10-CM

## 2018-09-24 DIAGNOSIS — F32.9 MAJOR DEPRESSION, CHRONIC: ICD-10-CM

## 2018-09-24 DIAGNOSIS — N18.4 CHRONIC KIDNEY DISEASE, STAGE 4 (SEVERE) (HCC): Primary | ICD-10-CM

## 2018-09-24 DIAGNOSIS — N82.9 FISTULA INVOLVING FEMALE GENITAL TRACT: ICD-10-CM

## 2018-09-24 DIAGNOSIS — M81.0 OSTEOPOROSIS WITHOUT CURRENT PATHOLOGICAL FRACTURE, UNSPECIFIED OSTEOPOROSIS TYPE: ICD-10-CM

## 2018-09-24 LAB
ABSOLUTE EOS #: 0 K/UL (ref 0–0.4)
ABSOLUTE IMMATURE GRANULOCYTE: ABNORMAL K/UL (ref 0–0.3)
ABSOLUTE LYMPH #: 1.6 K/UL (ref 1–4.8)
ABSOLUTE MONO #: 0.4 K/UL (ref 0.1–1.2)
ALBUMIN SERPL-MCNC: 4.7 G/DL (ref 3.5–5.2)
ALBUMIN/GLOBULIN RATIO: 1.2 (ref 1–2.5)
ALP BLD-CCNC: 195 U/L (ref 35–104)
ALT SERPL-CCNC: 38 U/L (ref 5–33)
ANION GAP SERPL CALCULATED.3IONS-SCNC: 13 MMOL/L (ref 9–17)
AST SERPL-CCNC: 51 U/L
BASOPHILS # BLD: 1 % (ref 0–1)
BASOPHILS ABSOLUTE: 0 K/UL (ref 0–0.2)
BILIRUB SERPL-MCNC: 0.4 MG/DL (ref 0.3–1.2)
BUN BLDV-MCNC: 45 MG/DL (ref 8–23)
BUN/CREAT BLD: 21 (ref 9–20)
CALCIUM SERPL-MCNC: 10.2 MG/DL (ref 8.6–10.4)
CHLORIDE BLD-SCNC: 100 MMOL/L (ref 98–107)
CO2: 25 MMOL/L (ref 20–31)
CREAT SERPL-MCNC: 2.13 MG/DL (ref 0.5–0.9)
DIFFERENTIAL TYPE: ABNORMAL
EOSINOPHILS RELATIVE PERCENT: 1 % (ref 1–7)
ESTIMATED AVERAGE GLUCOSE: 114 MG/DL
GFR AFRICAN AMERICAN: 29 ML/MIN
GFR NON-AFRICAN AMERICAN: 24 ML/MIN
GFR SERPL CREATININE-BSD FRML MDRD: ABNORMAL ML/MIN/{1.73_M2}
GFR SERPL CREATININE-BSD FRML MDRD: ABNORMAL ML/MIN/{1.73_M2}
GLUCOSE BLD-MCNC: 81 MG/DL (ref 70–99)
HBA1C MFR BLD: 5.6 % (ref 4.8–5.9)
HCT VFR BLD CALC: 36.6 % (ref 36–46)
HEMOGLOBIN: 12.5 G/DL (ref 12–16)
IMMATURE GRANULOCYTES: ABNORMAL %
LYMPHOCYTES # BLD: 31 % (ref 16–46)
MAGNESIUM: 1.9 MG/DL (ref 1.6–2.6)
MCH RBC QN AUTO: 30.7 PG (ref 26–34)
MCHC RBC AUTO-ENTMCNC: 34 G/DL (ref 31–37)
MCV RBC AUTO: 90.1 FL (ref 80–100)
MONOCYTES # BLD: 8 % (ref 4–11)
NRBC AUTOMATED: ABNORMAL PER 100 WBC
PDW BLD-RTO: 14.9 % (ref 11–14.5)
PLATELET # BLD: 148 K/UL (ref 140–450)
PLATELET ESTIMATE: ABNORMAL
PMV BLD AUTO: 7.6 FL (ref 6–12)
POTASSIUM SERPL-SCNC: 4.4 MMOL/L (ref 3.7–5.3)
RBC # BLD: 4.07 M/UL (ref 4–5.2)
RBC # BLD: ABNORMAL 10*6/UL
SEDIMENTATION RATE, ERYTHROCYTE: 52 MM (ref 0–30)
SEG NEUTROPHILS: 59 % (ref 43–77)
SEGMENTED NEUTROPHILS ABSOLUTE COUNT: 3.1 K/UL (ref 1.8–7.7)
SODIUM BLD-SCNC: 138 MMOL/L (ref 135–144)
TOTAL CK: 62 U/L (ref 26–192)
TOTAL PROTEIN: 8.6 G/DL (ref 6.4–8.3)
WBC # BLD: 5.2 K/UL (ref 3.5–11)
WBC # BLD: ABNORMAL 10*3/UL

## 2018-09-24 PROCEDURE — 82306 VITAMIN D 25 HYDROXY: CPT

## 2018-09-24 PROCEDURE — 82550 ASSAY OF CK (CPK): CPT

## 2018-09-24 PROCEDURE — 83036 HEMOGLOBIN GLYCOSYLATED A1C: CPT

## 2018-09-24 PROCEDURE — 99214 OFFICE O/P EST MOD 30 MIN: CPT | Performed by: INTERNAL MEDICINE

## 2018-09-24 PROCEDURE — 80053 COMPREHEN METABOLIC PANEL: CPT

## 2018-09-24 PROCEDURE — 90471 IMMUNIZATION ADMIN: CPT | Performed by: INTERNAL MEDICINE

## 2018-09-24 PROCEDURE — 85651 RBC SED RATE NONAUTOMATED: CPT

## 2018-09-24 PROCEDURE — 36415 COLL VENOUS BLD VENIPUNCTURE: CPT

## 2018-09-24 PROCEDURE — 85025 COMPLETE CBC W/AUTO DIFF WBC: CPT

## 2018-09-24 PROCEDURE — 90686 IIV4 VACC NO PRSV 0.5 ML IM: CPT | Performed by: INTERNAL MEDICINE

## 2018-09-24 PROCEDURE — 83735 ASSAY OF MAGNESIUM: CPT

## 2018-09-24 ASSESSMENT — PATIENT HEALTH QUESTIONNAIRE - PHQ9
SUM OF ALL RESPONSES TO PHQ QUESTIONS 1-9: 2
SUM OF ALL RESPONSES TO PHQ QUESTIONS 1-9: 2
SUM OF ALL RESPONSES TO PHQ9 QUESTIONS 1 & 2: 2
1. LITTLE INTEREST OR PLEASURE IN DOING THINGS: 0
2. FEELING DOWN, DEPRESSED OR HOPELESS: 2

## 2018-09-24 NOTE — PROGRESS NOTES
Have you had an allergic reaction to the flu (influenza) shot? no  Are you allergic to eggs or any component of the flu vaccine? no  Do you have a history of Guillain-[place] Syndrome (GBS), which is paralysis after receiving the flu vaccine? no  Are you feeling well today? yes  Flu vaccine given as ordered. Patient tolerated it well. No questions re: VIS information.

## 2018-09-24 NOTE — PATIENT INSTRUCTIONS
Patient Education        Learning About Diabetes Food Guidelines  Your Care Instructions    Meal planning is important to manage diabetes. It helps keep your blood sugar at a target level (which you set with your doctor). You don't have to eat special foods. You can eat what your family eats, including sweets once in a while. But you do have to pay attention to how often you eat and how much you eat of certain foods. You may want to work with a dietitian or a certified diabetes educator (CDE) to help you plan meals and snacks. A dietitian or CDE can also help you lose weight if that is one of your goals. What should you know about eating carbs? Managing the amount of carbohydrate (carbs) you eat is an important part of healthy meals when you have diabetes. Carbohydrate is found in many foods. · Learn which foods have carbs. And learn the amounts of carbs in different foods. ¨ Bread, cereal, pasta, and rice have about 15 grams of carbs in a serving. A serving is 1 slice of bread (1 ounce), ½ cup of cooked cereal, or 1/3 cup of cooked pasta or rice. ¨ Fruits have 15 grams of carbs in a serving. A serving is 1 small fresh fruit, such as an apple or orange; ½ of a banana; ½ cup of cooked or canned fruit; ½ cup of fruit juice; 1 cup of melon or raspberries; or 2 tablespoons of dried fruit. ¨ Milk and no-sugar-added yogurt have 15 grams of carbs in a serving. A serving is 1 cup of milk or 2/3 cup of no-sugar-added yogurt. ¨ Starchy vegetables have 15 grams of carbs in a serving. A serving is ½ cup of mashed potatoes or sweet potato; 1 cup winter squash; ½ of a small baked potato; ½ cup of cooked beans; or ½ cup cooked corn or green peas. · Learn how much carbs to eat each day and at each meal. A dietitian or CDE can teach you how to keep track of the amount of carbs you eat. This is called carbohydrate counting. · If you are not sure how to count carbohydrate grams, use the Plate Method to plan meals.  It is a good, quick way to make sure that you have a balanced meal. It also helps you spread carbs throughout the day. ¨ Divide your plate by types of foods. Put non-starchy vegetables on half the plate, meat or other protein food on one-quarter of the plate, and a grain or starchy vegetable in the final quarter of the plate. To this you can add a small piece of fruit and 1 cup of milk or yogurt, depending on how many carbs you are supposed to eat at a meal.  · Try to eat about the same amount of carbs at each meal. Do not \"save up\" your daily allowance of carbs to eat at one meal.  · Proteins have very little or no carbs per serving. Examples of proteins are beef, chicken, turkey, fish, eggs, tofu, cheese, cottage cheese, and peanut butter. A serving size of meat is 3 ounces, which is about the size of a deck of cards. Examples of meat substitute serving sizes (equal to 1 ounce of meat) are 1/4 cup of cottage cheese, 1 egg, 1 tablespoon of peanut butter, and ½ cup of tofu. How can you eat out and still eat healthy? · Learn to estimate the serving sizes of foods that have carbohydrate. If you measure food at home, it will be easier to estimate the amount in a serving of restaurant food. · If the meal you order has too much carbohydrate (such as potatoes, corn, or baked beans), ask to have a low-carbohydrate food instead. Ask for a salad or green vegetables. · If you use insulin, check your blood sugar before and after eating out to help you plan how much to eat in the future. · If you eat more carbohydrate at a meal than you had planned, take a walk or do other exercise. This will help lower your blood sugar. What else should you know? · Limit saturated fat, such as the fat from meat and dairy products. This is a healthy choice because people who have diabetes are at higher risk of heart disease. So choose lean cuts of meat and nonfat or low-fat dairy products.  Use olive or canola oil instead of butter or shortening when cooking. · Don't skip meals. Your blood sugar may drop too low if you skip meals and take insulin or certain medicines for diabetes. · Check with your doctor before you drink alcohol. Alcohol can cause your blood sugar to drop too low. Alcohol can also cause a bad reaction if you take certain diabetes medicines. Follow-up care is a key part of your treatment and safety. Be sure to make and go to all appointments, and call your doctor if you are having problems. It's also a good idea to know your test results and keep a list of the medicines you take. Where can you learn more? Go to https://[facility]web.[employer]. org and sign in to your [employer] account. Enter H454 in the T[employer] L[employer] box to learn more about \"Learning About Diabetes Food Guidelines. \"     If you do not have an account, please click on the \"Sign Up Now\" link. Current as of: December 7, 2017  Content Version: 11.7  © 7755-3072 [employer]. Care instructions adapted under license by [facility] ([facility]). If you have questions about a medical condition or this instruction, always ask your healthcare professional. [place] [address] any warranty or liability for your use of this information.

## 2018-09-25 LAB — VITAMIN D 25-HYDROXY: 75.6 NG/ML (ref 30–100)

## 2018-09-26 ENCOUNTER — TELEPHONE (OUTPATIENT)
Dept: UROLOGY | Age: 63
End: 2018-09-26

## 2018-09-26 DIAGNOSIS — N18.4 CHRONIC KIDNEY DISEASE, STAGE 4 (SEVERE) (HCC): Primary | ICD-10-CM

## 2018-09-26 NOTE — PROGRESS NOTES
Spoke with  - he states that patient takes calcium 600 with vitmain d3 400 IU BID. She does not take a separate vitamin d supplement. She stopped the vitamin d 1000 after she did the last blood work because the vitamin d was high.

## 2018-09-26 NOTE — TELEPHONE ENCOUNTER
Dr Amparo Pedraza is referring a NEW Patient to  Nephrology for chronic kidney disease, stage 4,. [place] Amanda Moore [place] [facility] [place] what we have available in [place] and they were told that they need to schedule patient in [place]. Are we able to work patient in sooner than scheduled appointment Feb 4 with Dr Suraj Caba? Call patient if we are able to work in sooner.

## 2018-09-27 NOTE — PROGRESS NOTES
Shara Cueto  YOB: 1955    Date of Service:  9/24/2018      HPI:  Mrailee Horan was seen in the internal medicine office today for:  Chief Complaint  Patient presents with  · Alopecia. · Kidney problem. · Liver problem. · Mitral valve disease. · Diabetes. · Cholesterol problem. · and continued evaluation and management of chronic medical problems. We addressed the following new, acute or uncontrolled/unstable issues:    1. We obtained labs following the visit and the creatinine is a concern. It has drifted upward to 2.13 with a GFR of 24. She has not had any recent contrast agents. She really is not on nephrotoxic drugs. The etiology of this is not clear. She has not noted change in urination. 2.  Alopecia. She did consult with a dermatologist who recommended she ask me about Rogaine and we reviewed this. She has had significant hair loss. We addressed the following chronic issues:    · We reviewed the diabetes. HA1c most recently looked good. It has been some time since she has had this checked though and we talked about checking this and the microalbumin. She is not having polyuria or polydipsia. · Vitamin D deficiency  - No trouble with the vitamin D supplements, and recent level reviewed. · Osteoporosis  - Doing OK with the prescription medication, vit D, and Calcium. Working on [employer] bearing exercise. · Dyslipidemia  - She is not having any difficulty with her cholesterol medications. No significant myalgias. · Hypertension  - She is doing well with her antihypertensive meds. No chest pain, dizziness or palpitations. · Thyroid Disease  - She has been doing fine with her thyroid medication and takes it quite regularly. · Depression  - Her mood has been stable. No trouble with the antidepressant medications. She is not having difficulty with the Zoloft. · We talked about her valve disease and possible echo.     Review of Systems:  Constitutional:  Negative for chills, fever, and weight loss. HENT:  Negative for congestion, ear pain, and sore throat. Positive for alopecia. Eyes:  Negative for blurred vision, double vision, pain and discharge. Respiratory:  Negative for cough, shortness of breath, and wheezing. Cardiovascular:  Negative for chest pain, palpitations, and PND. Gastrointestinal:  Negative for abdominal pain, blood in stool, constipation, diarrhea, nausea and vomiting. Genitourinary:  Negative for dysuria, frequency, and hematuria. Musculoskeletal:  Negative for myalgias. Skin:  Negative for rash. Neurological:  Negative for tingling, sensory change, speech change, focal weakness, seizures, and headaches. Endo/Heme/Allergies:  Does not bruise/bleed easily. Psychiatric/Behavioral:  Negative for hallucinations and suicidal ideas.      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    Encounter for long-term (current) use of other medications    Essential hypertension    Hypothyroidism    Chronic renal failure, stage 3 (moderate)    Diabetes type 2, controlled (HCC)    Major depression, chronic    Encounter for chronic pain management    Herniated cervical disc    Chronic cerebral ischemia    VBI (vertebrobasilar insufficiency)    Cervical radiculopathy    Bilateral carpal tunnel syndrome    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 90 tablet 0    sertraline (ZOLOFT) 50 MG tablet Take 1 tablet by mouth daily Take along with zoloft 100 mg daily (total 150 mg daily) 90 tablet 3    sertraline (ZOLOFT) 100 MG tablet TAKE 1 TABLET DAILY  ALONG WITH A 50 MG TABLET (TOTAL  MG DAILY) 90 tablet 3    levothyroxine (SYNTHROID) 125 MCG tablet take 1 tablet by mouth once daily 30 tablet 11    raloxifene (EVISTA) 60 MG tablet TAKE 1 TABLET DAILY 90 tablet 3    sulfamethoxazole-trimethoprim (BACTRIM DS;SEPTRA DS) 800-160 MG per tablet TAKE ONE-HALF (1/2) TABLET 5 DAYS A WEEK ( MONDAY THROUGH FRIDAY ) 30 tablet 3    gabapentin (NEURONTIN) 800 MG tablet TAKE 1 TABLET THREE TIMES A  tablet 3    omeprazole (PRILOSEC) 20 MG delayed release capsule TAKE 1 CAPSULE TWICE A  capsule 3    famotidine (PEPCID) 40 MG tablet TAKE 1 TABLET NIGHTLY AS NEEDED 90 tablet 3    tinidazole (TINDAMAX) 250 MG tablet Take 250 mg by mouth daily      HYDROmorphone (DILAUDID) 2 MG tablet Take 1 tablet by mouth 3 times daily as needed for Pain . 90 tablet 0    atorvastatin (LIPITOR) 10 MG tablet Take 1 tablet by mouth daily 30 tablet 11    ondansetron (ZOFRAN ODT) 4 MG disintegrating tablet Take 1 tablet by mouth every 8 hours as needed for Nausea 20 tablet 0    calcium carbonate (OSCAL) 500 MG TABS tablet Take 500 mg by mouth 2 times daily      PROGRAF 1 MG capsule Take 1 mg by mouth 2 times daily.  glucose blood VI test strips (ASCENSIA AUTODISC VI;ONE TOUCH ULTRA TEST VI) strip One touch ultra test strips. Test up to 4 times a day. 100 each 11     No current facility-administered medications for this visit.         Past Medical History:        Diagnosis Date    Anxiety     Breast disease     History of fibroadenomatous breast disease with biopsy x 2.    Choroidal neovascularization     Peripapillary with hemorrhage, Dr. Stephany Romero, 2012    Chronic abdominal pain     [place] has attributed to pouchitis and possible adhesions. Extensive repeated eval.  EGD 9/13. dr Griselda Mages also following  Repeat egd 11/28/14 and , Pouchoscopy 11/28/14 Dr Yuniel Shah  EGD- patchy gastritis, repeat EGD 4/16 Dr Mounika Kim 9/15 [facility]    Chronic kidney disease     1.)Creatine clearance 58 05/06 and total urine protein 405. 2.)Dr. John Lambert, [address] [place] [address], felt this was secondary to Prograf and rapamune 6/06, 3.)history of hyperkalemia leading to discontinuatuin of ACE inhibitor. 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 Eval Dr. [facility] clinic 2013 recommended watchful waiting. 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  GERD (gastroesophageal reflux disease)     EGD 9/13. dr Griselda Mages also following  Repeat egd 11/28/14 and ,  EGD- patchy gastritis, repeat EGD 4/16 Dr Tyron Knott Headache(784.0)     Herniated cervical disc 03/2009    C6-C7 with compression of the thecal sac most significantly on MRI. 1.1) Dr. Ramón almanza recommended a 2nd opinion from neurology. 1.2) Had a 2nd opinion for Dr. Marino Phillips who referred to a neurosurgeon in [place] for another opinion. 1.3)  That surgeon, Dr. To Wills felt surgery was not warranted and he would recommend PT or possible injection therapy.  History of atrial fibrillation     Stress test negative October 2014. Echo with moderate MR normal ejection fraction September 2014. Ablation performed December 2014.  Hypertension     Hypothyroidism     Interstitial cystitis     Dr. Mikal Pruett    Iron deficiency anemia     Leukocytoclastic vasculitis [facility]     possible    Liver transplanted [facility])     [facility].   Liver Bx 9/13    MGUS (monoclonal gammopathy of unknown significance)     Trace, Dr. Cancino Fess  -SPEP nl 2009    Migraine headache     evaluation, Dr Aidan Ibrahim, [facility] Mitral valve regurgitation     mod on echo 9/14, & 4/16, stable 8/17    Osteoporosis     1.) DEXA, 03/05, T -1.1 spine, T -3.1 hip 2.) DEXA, 12/07, T -0.8 spine, T -1.9 spine 3.) DEXA, 03/10, T -0.2 spine, T -1.7 hip  4) DEXA 7/15 FRAX 6.7% 10 yr risk hip. [facility] prefers no Prolia due to infection risk, and GFR 30    Peripheral neuropathy     emg 2014- severe    Pouchitis ([address] 2006    Versus small bowel bacterial overgrowth, Dr. Yves Maldonado specialist [facility]. 1.1) [facility] 8/07 for 12 days with dx being pouchitis. 1.2) panniculitis, 8/07, [facility]. 1.3) Repeat scope, Dr. Sugey Delgado, done on pouch 6/11 being okay. Repeat pouchoscopy 9/13& 4/16 [facility]    Primary sclerosing cholangitis     1.1) Following with liver transplant team at [facility] and with Dr. Vaishnavi Kurtz, gastroenterologist at [facility]. 1.2) Multiple stent replacements in the biliary tract for this about every 6 months. 1.3) Liver transplant, 11/04. Dr Yong uGajardo, Dr Favio Sim, Transplant coordinator for labs etc. 1.4) Liver biopsy, 6/06.  SVT (supraventricular tachycardia) ([address]     ablated [facility] 2004    Ulcerative colitis ([address]     Status post colectomy. 1.1) Colonoscopy 1/04, Dr. Yola Leigh, with evidence of marked chronic inflammatory changes. 1.2) Repeat pouchoscopy/colonoscopy, 1/4/06, Dr. Yola Leigh, with evidence of pouchitis. 1.3) Repeat colonoscopy, Dr. Sandra Lopez, 6/06. 1.4) Repeat colonoscopy 8/09, Dr. Sugey Delgado. 1.5) Repeat colonoscopy, Dr. Sugey Delgado 04/12.  Vitamin D deficiency        Family Hx & Social HX    family history includes COPD in her mother; Cirrhosis in an other family member; Diabetes in her father; Heart Disease in her mother.   History   Smoking Status    Former Smoker    Packs/day: 0.80    Years: 2.00    Quit date: 1/1/1976 normal mood and affect. Her behavior is normal.  Judgment normal.       Lab Results   Component Value Date    K 4.4 09/24/2018    CREATININE 2.13 09/24/2018    AST 51 09/24/2018    ALT 38 09/24/2018    TSH 0.55 06/18/2018    HCT 36.6 09/24/2018    LABA1C 5.6 09/24/2018    MICROALBUR 23 07/25/2017    GLUCOSE 81 09/24/2018    MG 1.9 09/24/2018    CALCIUM 10.2 09/24/2018    YUTGNTSU58 1734 05/24/2018    VITD25 75.6 09/24/2018      Lab Results   Component Value Date    CHOL 160 03/27/2018    CHOL 143 03/17/2017    TRIG 90 03/27/2018    HDL 68 03/27/2018    LDLCHOLESTEROL 74 03/27/2018     Labs reviewed and discussed with the patient? Yes    Assessment/Plan:    1. Chronic kidney disease, stage 4 (severe) (HCC)  I am concerned with the deteriorating creatinine. I think we need to get her back in to see the kidney specialist.  She had seen the kidney specialist up at [facility] but it has been a number of years, I believe. We will contact the patient and arrange nephrology consultation either here locally or up at [facility] depending on her choice. We will get a renal ultrasound and a UA with micro also as part of the initial evaluation here. 2. Liver transplanted  3. Primary sclerosing cholangitis  4. Chronic abdominal pain  She continues to follow with the specialist up at [facility] and has followup scheduled. She does seem to be doing pretty well here. Her chronic abdominal pain has markedly improved. 5. Controlled type 2 diabetes mellitus with diabetic nephropathy, without long-term current use of insulin ([address]  Doing well here. HA1c and microalbumin pending.  - Microalbumin, Ur; Future  - Hemoglobin A1C; Future    6. Osteoporosis without current pathological fracture, unspecified osteoporosis type  Remains on Evista. DEXA scan will be due November 2019. Try to get weightbearing exercise. 7. Dyslipidemia  Now on a statin. Recent lipids reviewed. No change today.     8. Vitamin D

## 2018-09-28 ENCOUNTER — HOSPITAL ENCOUNTER (OUTPATIENT)
Dept: ULTRASOUND IMAGING | Age: 63
Discharge: HOME | End: 2018-09-30
Payer: COMMERCIAL

## 2018-09-28 DIAGNOSIS — N18.4 CHRONIC KIDNEY DISEASE, STAGE 4 (SEVERE) (HCC): ICD-10-CM

## 2018-09-28 PROCEDURE — 76770 US EXAM ABDO BACK WALL COMP: CPT

## 2018-10-03 ENCOUNTER — TELEPHONE (OUTPATIENT)
Dept: NEPHROLOGY | Age: 63
End: 2018-10-03

## 2018-10-03 ENCOUNTER — HOSPITAL ENCOUNTER (OUTPATIENT)
Dept: NON INVASIVE DIAGNOSTICS | Age: 63
Discharge: HOME | End: 2018-10-03
Payer: COMMERCIAL

## 2018-10-03 LAB
LV EF: 48 %
LVEF MODALITY: NORMAL

## 2018-10-03 PROCEDURE — 93306 TTE W/DOPPLER COMPLETE: CPT

## 2018-10-08 DIAGNOSIS — R93.1 DECREASED CARDIAC EJECTION FRACTION: Primary | ICD-10-CM

## 2018-10-08 DIAGNOSIS — F41.9 ANXIETY: ICD-10-CM

## 2018-10-08 RX ORDER — CLONAZEPAM 1 MG/1
TABLET ORAL
Qty: 90 TABLET | Refills: 0 | Status: SHIPPED | OUTPATIENT
Start: 2018-10-08 | End: 2018-11-15 | Stop reason: SDUPTHER

## 2018-10-18 ENCOUNTER — TELEPHONE (OUTPATIENT)
Dept: INTERNAL MEDICINE | Age: 63
End: 2018-10-18

## 2018-10-18 RX ORDER — AMOXICILLIN 500 MG/1
4 TABLET, FILM COATED ORAL ONCE
Qty: 4 TABLET | Refills: 1 | Status: SHIPPED | OUTPATIENT
Start: 2018-10-18 | End: 2018-10-18

## 2018-10-23 ENCOUNTER — OFFICE VISIT (OUTPATIENT)
Dept: NEPHROLOGY | Age: 63
End: 2018-10-23
Payer: COMMERCIAL

## 2018-10-23 VITALS
BODY MASS INDEX: 23.4 KG/M2 | HEART RATE: 72 BPM | DIASTOLIC BLOOD PRESSURE: 70 MMHG | HEIGHT: 69 IN | WEIGHT: 158 LBS | SYSTOLIC BLOOD PRESSURE: 100 MMHG

## 2018-10-23 DIAGNOSIS — E11.21 CONTROLLED TYPE 2 DIABETES MELLITUS WITH DIABETIC NEPHROPATHY, WITHOUT LONG-TERM CURRENT USE OF INSULIN (HCC): ICD-10-CM

## 2018-10-23 DIAGNOSIS — Z94.4 LIVER TRANSPLANTED (HCC): ICD-10-CM

## 2018-10-23 DIAGNOSIS — E55.9 VITAMIN D DEFICIENCY: ICD-10-CM

## 2018-10-23 DIAGNOSIS — I48.91 ATRIAL FIBRILLATION WITH RVR (HCC): ICD-10-CM

## 2018-10-23 DIAGNOSIS — N18.30 CHRONIC RENAL FAILURE, STAGE 3 (MODERATE) (HCC): Primary | ICD-10-CM

## 2018-10-23 DIAGNOSIS — K83.01 PRIMARY SCLEROSING CHOLANGITIS: ICD-10-CM

## 2018-10-23 DIAGNOSIS — I10 ESSENTIAL HYPERTENSION: ICD-10-CM

## 2018-10-23 PROCEDURE — 99203 OFFICE O/P NEW LOW 30 MIN: CPT | Performed by: INTERNAL MEDICINE

## 2018-10-23 NOTE — PROGRESS NOTES
2.)Dr. Samantha Kwon, [address] [place] [address], felt this was secondary to Prograf and rapamune 6/06, 3.)history of hyperkalemia leading to discontinuatuin of ACE inhibitor. 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 Eval Dr. Basia Mccarthy clinic 2013 recommended watchful waiting. 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  GERD (gastroesophageal reflux disease)     EGD 9/13. dr Keron Ellis also following  Repeat egd 11/28/14 and ,  EGD- patchy gastritis, repeat EGD 4/16 Dr Rubio Denis Headache(784.0)     Herniated cervical disc 03/2009    C6-C7 with compression of the thecal sac most significantly on MRI. 1.1) Dr. Debra almanza recommended a 2nd opinion from neurology. 1.2) Had a 2nd opinion for Dr. Duy Otto who referred to a neurosurgeon in [place] for another opinion. 1.3)  That surgeon, Dr. Shani Milligan felt surgery was not warranted and he would recommend PT or possible injection therapy.  History of atrial fibrillation     Stress test negative October 2014. Echo with moderate MR normal ejection fraction September 2014. Ablation performed December 2014.  Hypertension     Hypothyroidism     Interstitial cystitis     Dr. Angela Lau    Iron deficiency anemia     Leukocytoclastic vasculitis ([facility]     possible    Liver transplanted [facility])     [facility] following.   Liver Bx 9/13    MGUS (monoclonal gammopathy of unknown significance)     Trace[facility]    Migraine headache     evaluation, Dr Cely Tejada, [facility] Mitral valve regurgitation     mod on echo 9/14, & 4/16, stable 8/17    Osteoporosis     1.) DEXA, 03/05, T -1.1 spine, T -3.1 hip 2.) DEXA, 12/07, T -0.8 spine, T -1.9 spine 3.) DEXA, 03/10, T -0.2 spine, T -1.7 hip  4) DEXA 7/15 FRAX 6.7% 10 yr risk hip. [facility] prefers no Prolia due to infection risk, and GFR 30    Peripheral neuropathy     emg - severe    Pouchitis ([address]     Versus small bowel bacterial overgrowth, Dr. Thu De La Rosa specialist [facility]. 1.1) [facility]  for 12 days with dx being pouchitis. 1.2) panniculitis, , [facility]. 1.3) Repeat scope, Dr. José Deras, done on pouch  being okay. Repeat pouchoscopy &  [facility]    Primary sclerosing cholangitis     1.1) Following with liver transplant team at [facility] and with Dr. Brain Barrientos, gastroenterologist at [facility]. 1.2) Multiple stent replacements in the biliary tract for this about every 6 months. 1.3) Liver transplant, . Dr Anselmo Dos Santos, Dr Elzbieta Jones, Transplant coordinator for labs etc. 1.4) Liver biopsy, .  SVT (supraventricular tachycardia) ([address]     ablated [facility]     Ulcerative colitis ([address]     Status post colectomy. 1.1) Colonoscopy , Dr. Bruno Gibson, with evidence of marked chronic inflammatory changes. 1.2) Repeat pouchoscopy/colonoscopy, 06, Dr. Bruno Gibson, with evidence of pouchitis. 1.3) Repeat colonoscopy, Dr. Jesus Lopez, . 1.4) Repeat colonoscopy , Dr. José Deras. 1.5) Repeat colonoscopy, Dr. José Deras .  Vitamin D deficiency        Past Surgical History:        Procedure Laterality Date    ABDOMINAL HERNIA REPAIR      With extensive revision of scar from the  procedure, recurrent ventral hernia repair     APPENDECTOMY      ATRIAL ABLATION SURGERY  , 12/10/14    had it done twice.      BREAST BIOPSY Left     BREAST SURGERY      Biopsy x2    CATARACT REMOVAL WITH IMPLANT Right 2017    [facility]    CATARACT REMOVAL WITH IMPLANT Left 2017    [facility]     SECTION      three times    CHOLECYSTECTOMY      COLON SURGERY      total colectomy and colostomy with reversal    phlegm or wheezing. Abdomen:  No abdominal pain, nausea or vomiting. Neuro:  No focal weakness, abnormal movements orseizure like activity. Skin:   No rashes, no itching. :   No hematuria, no pyuria, no dysuria, no flank pain, some urgency. Extremities:  No swelling or joint pains. Objective:  /70   Pulse 72   Ht 5' 9\" (1.753 m)   Wt 158 lb (71.7 kg)   BMI 23.33 kg/m²     Physical Exam:  General appearance:Awake, alert, in no acute distress  Skin: warm and +ve dry skin, no rash or erythema  Eyes: conjunctivae normal and sclera anicteric  ENT: :no thrush no pharyngeal congestion    Neck: without any JVD.    Pulmonary: lungs are clear and without any wheezing or rhonchi   Cardiovascular:Normal S1 & S2, No Murmur   Abdomen: soft nontender, bowel sounds present, no organomegaly,  No ascites    Extremities: no cyanosis, clubbing or edema    Labs:    CBC:   Lab Results   Component Value Date    WBC 5.2 09/24/2018    RBC 4.07 09/24/2018    HGB 12.5 09/24/2018    HCT 36.6 09/24/2018    MCV 90.1 09/24/2018    RDW 14.9 09/24/2018     09/24/2018    MPV 7.6 09/24/2018      BMP:   Lab Results   Component Value Date     09/24/2018     03/27/2018     02/28/2018    K 4.4 09/24/2018    K 4.2 03/27/2018    K 4.4 02/28/2018     09/24/2018     03/27/2018     02/28/2018    CO2 25 09/24/2018    CO2 31 03/27/2018    CO2 28 02/28/2018    BUN 45 09/24/2018    BUN 27 03/27/2018    BUN 22 02/28/2018    CREATININE 2.13 09/24/2018    CREATININE 1.85 05/24/2018    CREATININE 1.32 03/27/2018    GLUCOSE 81 09/24/2018    GLUCOSE 102 03/27/2018    GLUCOSE 111 02/28/2018    CALCIUM 10.2 09/24/2018    CALCIUM 9.6 03/27/2018    CALCIUM 9.7 02/28/2018      PHOSPHORUS:    Lab Results   Component Value Date    PHOS 3.4 03/27/2018    PHOS 4.0 02/28/2018    PHOS 2.9 03/29/2016     MAGNESIUM:   Lab Results   Component Value Date    MG 1.9 09/24/2018     ALBUMIN:   Lab Results   Component Value

## 2018-10-31 ENCOUNTER — HOSPITAL ENCOUNTER (OUTPATIENT)
Dept: LAB | Age: 63
Discharge: HOME | End: 2018-10-31
Payer: COMMERCIAL

## 2018-10-31 ENCOUNTER — OFFICE VISIT (OUTPATIENT)
Dept: CARDIOLOGY | Age: 63
End: 2018-10-31
Payer: COMMERCIAL

## 2018-10-31 VITALS
DIASTOLIC BLOOD PRESSURE: 64 MMHG | WEIGHT: 155 LBS | SYSTOLIC BLOOD PRESSURE: 104 MMHG | HEIGHT: 69 IN | BODY MASS INDEX: 22.96 KG/M2 | HEART RATE: 76 BPM

## 2018-10-31 DIAGNOSIS — I10 ESSENTIAL HYPERTENSION: ICD-10-CM

## 2018-10-31 DIAGNOSIS — N18.30 CHRONIC RENAL FAILURE, STAGE 3 (MODERATE) (HCC): ICD-10-CM

## 2018-10-31 DIAGNOSIS — Z94.4 LIVER TRANSPLANTED (HCC): ICD-10-CM

## 2018-10-31 DIAGNOSIS — R93.1 ABNORMAL ECHOCARDIOGRAPHY: ICD-10-CM

## 2018-10-31 DIAGNOSIS — I48.91 ATRIAL FIBRILLATION WITH RVR (HCC): Primary | ICD-10-CM

## 2018-10-31 LAB
ABSOLUTE EOS #: 0 K/UL (ref 0–0.4)
ABSOLUTE IMMATURE GRANULOCYTE: ABNORMAL K/UL (ref 0–0.3)
ABSOLUTE LYMPH #: 1.2 K/UL (ref 1–4.8)
ABSOLUTE MONO #: 0.3 K/UL (ref 0.1–1.2)
ALBUMIN SERPL-MCNC: 4.4 G/DL (ref 3.5–5.2)
ALBUMIN/GLOBULIN RATIO: 1.1 (ref 1–2.5)
ALP BLD-CCNC: 189 U/L (ref 35–104)
ALT SERPL-CCNC: 34 U/L (ref 5–33)
ANION GAP SERPL CALCULATED.3IONS-SCNC: 15 MMOL/L (ref 9–17)
AST SERPL-CCNC: 37 U/L
BASOPHILS # BLD: 1 % (ref 0–1)
BASOPHILS ABSOLUTE: 0 K/UL (ref 0–0.2)
BILIRUB SERPL-MCNC: 0.41 MG/DL (ref 0.3–1.2)
BUN BLDV-MCNC: 32 MG/DL (ref 8–23)
BUN/CREAT BLD: 18 (ref 9–20)
CALCIUM SERPL-MCNC: 9.7 MG/DL (ref 8.6–10.4)
CHLORIDE BLD-SCNC: 100 MMOL/L (ref 98–107)
CO2: 23 MMOL/L (ref 20–31)
COMPLEMENT C3: 133 MG/DL (ref 90–180)
COMPLEMENT C4: 25 MG/DL (ref 10–40)
CREAT SERPL-MCNC: 1.77 MG/DL (ref 0.5–0.9)
CREATININE URINE: 115.7 MG/DL (ref 28–217)
DIFFERENTIAL TYPE: ABNORMAL
EOSINOPHILS RELATIVE PERCENT: 1 % (ref 1–7)
FREE KAPPA/LAMBDA RATIO: 2.35 (ref 0.26–1.65)
GFR AFRICAN AMERICAN: 35 ML/MIN
GFR NON-AFRICAN AMERICAN: 29 ML/MIN
GFR SERPL CREATININE-BSD FRML MDRD: ABNORMAL ML/MIN/{1.73_M2}
GFR SERPL CREATININE-BSD FRML MDRD: ABNORMAL ML/MIN/{1.73_M2}
GLUCOSE BLD-MCNC: 102 MG/DL (ref 70–99)
HAV IGM SER IA-ACNC: NONREACTIVE
HCT VFR BLD CALC: 38 % (ref 36–46)
HEMOGLOBIN: 12.4 G/DL (ref 12–16)
HEPATITIS B CORE IGM ANTIBODY: NONREACTIVE
HEPATITIS B SURFACE ANTIGEN: NONREACTIVE
HEPATITIS C ANTIBODY: NONREACTIVE
IMMATURE GRANULOCYTES: ABNORMAL %
KAPPA FREE LIGHT CHAINS QNT: 5.38 MG/DL (ref 0.37–1.94)
LAMBDA FREE LIGHT CHAINS QNT: 2.29 MG/DL (ref 0.57–2.63)
LYMPHOCYTES # BLD: 29 % (ref 16–46)
MCH RBC QN AUTO: 29.4 PG (ref 26–34)
MCHC RBC AUTO-ENTMCNC: 32.7 G/DL (ref 31–37)
MCV RBC AUTO: 89.8 FL (ref 80–100)
MONOCYTES # BLD: 8 % (ref 4–11)
NRBC AUTOMATED: ABNORMAL PER 100 WBC
PDW BLD-RTO: 14.7 % (ref 11–14.5)
PLATELET # BLD: 133 K/UL (ref 140–450)
PLATELET ESTIMATE: ABNORMAL
PMV BLD AUTO: 7.6 FL (ref 6–12)
POTASSIUM SERPL-SCNC: 3.8 MMOL/L (ref 3.7–5.3)
RBC # BLD: 4.23 M/UL (ref 4–5.2)
RBC # BLD: ABNORMAL 10*6/UL
SEG NEUTROPHILS: 61 % (ref 43–77)
SEGMENTED NEUTROPHILS ABSOLUTE COUNT: 2.5 K/UL (ref 1.8–7.7)
SODIUM BLD-SCNC: 138 MMOL/L (ref 135–144)
TOTAL PROTEIN, URINE: 28 MG/DL
TOTAL PROTEIN: 8.3 G/DL (ref 6.4–8.3)
URINE TOTAL PROTEIN CREATININE RATIO: 0.24 (ref 0–0.2)
VITAMIN D 25-HYDROXY: 55.1 NG/ML (ref 30–100)
WBC # BLD: 4 K/UL (ref 3.5–11)
WBC # BLD: ABNORMAL 10*3/UL

## 2018-10-31 PROCEDURE — 84155 ASSAY OF PROTEIN SERUM: CPT

## 2018-10-31 PROCEDURE — 86160 COMPLEMENT ANTIGEN: CPT

## 2018-10-31 PROCEDURE — 84156 ASSAY OF PROTEIN URINE: CPT

## 2018-10-31 PROCEDURE — 82570 ASSAY OF URINE CREATININE: CPT

## 2018-10-31 PROCEDURE — 80053 COMPREHEN METABOLIC PANEL: CPT

## 2018-10-31 PROCEDURE — 99203 OFFICE O/P NEW LOW 30 MIN: CPT | Performed by: INTERNAL MEDICINE

## 2018-10-31 PROCEDURE — 93000 ELECTROCARDIOGRAM COMPLETE: CPT | Performed by: INTERNAL MEDICINE

## 2018-10-31 PROCEDURE — 83516 IMMUNOASSAY NONANTIBODY: CPT

## 2018-10-31 PROCEDURE — 86334 IMMUNOFIX E-PHORESIS SERUM: CPT

## 2018-10-31 PROCEDURE — 84166 PROTEIN E-PHORESIS/URINE/CSF: CPT

## 2018-10-31 PROCEDURE — 86038 ANTINUCLEAR ANTIBODIES: CPT

## 2018-10-31 PROCEDURE — 80074 ACUTE HEPATITIS PANEL: CPT

## 2018-10-31 PROCEDURE — 83883 ASSAY NEPHELOMETRY NOT SPEC: CPT

## 2018-10-31 PROCEDURE — 85025 COMPLETE CBC W/AUTO DIFF WBC: CPT

## 2018-10-31 PROCEDURE — 84165 PROTEIN E-PHORESIS SERUM: CPT

## 2018-10-31 PROCEDURE — 36415 COLL VENOUS BLD VENIPUNCTURE: CPT

## 2018-10-31 PROCEDURE — 82306 VITAMIN D 25 HYDROXY: CPT

## 2018-10-31 NOTE — PROGRESS NOTES
mg by mouth 2 times daily   Yes Historical Provider, MD   PROGRAF 1 MG capsule Take 1 mg by mouth 2 times daily. 6/26/14  Yes Historical Provider, MD       Allergies:  Codeine; Tylenol with codeine #3 [acetaminophen-codeine]; Vicodin [hydrocodone-acetaminophen]; Ace inhibitors; Aspirin; Azulfidine [sulfasalazine]; Lauar-d [diphenhydramine]; Elmiron [pentosan polysulfate sodium]; Fentanyl; Medrol [methylprednisolone]; Morphine; Other; Reglan [metoclopramide]; Tramadol; and Phenergan [promethazine]    Social History:   reports that she quit smoking about 42 years ago. She has a 1.60 pack-year smoking history. She has never used smokeless tobacco. She reports that she does not drink alcohol or use drugs. Review of Systems:  · Constitutional: there has been no unanticipated weight loss. There's been No change in energy level, No change in activity level. · Eyes: No visual changes or diplopia. No scleral icterus. · ENT: No Headaches, hearing loss or vertigo. No mouth sores or sore throat. · Cardiovascular: As above. · Respiratory: No SOB, cough or hemoptysis. · Gastrointestinal: No abdominal pain, appetite loss, blood in stools. No change in bowel or bladder habits. · Genitourinary: No dysuria, trouble voiding, or hematuria. · Musculoskeletal:  No gait disturbance, No weakness or joint complaints. · Integumentary: No rash or pruritis. · Neurological: No headache, diplopia, change in muscle strength, numbness or tingling. No change in gait, balance, coordination, mood, affect, memory, mentation, behavior. · Psychiatric: No anxiety, or depression. · Endocrine: No temperature intolerance. No excessive thirst, fluid intake, or urination. No tremor. · Hematologic/Lymphatic: No abnormal bruising or bleeding, blood clots or swollen lymph nodes. · Allergic/Immunologic: No nasal congestion or hives.     Physical Exam:  /64   Pulse 76   Ht 5' 9\" (1.753 m)   Wt 155 lb (70.3 kg)   BMI 22.89 kg/m²

## 2018-11-01 LAB
ALBUMIN (CALCULATED): 4.8 G/DL (ref 3.2–5.2)
ALBUMIN PERCENT: 65 % (ref 45–65)
ALPHA 1 PERCENT: 2 % (ref 3–6)
ALPHA 2 PERCENT: 8 % (ref 6–13)
ALPHA-1-GLOBULIN: 0.2 G/DL (ref 0.1–0.4)
ALPHA-2-GLOBULIN: 0.6 G/DL (ref 0.5–0.9)
ANTI-NUCLEAR ANTIBODY (ANA): NEGATIVE
BETA GLOBULIN: 0.8 G/DL (ref 0.5–1.1)
BETA PERCENT: 11 % (ref 11–19)
GAMMA GLOBULIN %: 14 % (ref 9–20)
GAMMA GLOBULIN: 1.1 G/DL (ref 0.5–1.5)
PATHOLOGIST: ABNORMAL
PATHOLOGIST: NORMAL
PROTEIN ELECTROPHORESIS, SERUM: ABNORMAL
SERUM IFX INTERP: NORMAL
TOTAL PROT. SUM,%: 100 % (ref 98–102)
TOTAL PROT. SUM: 7.5 G/DL (ref 6.3–8.2)
TOTAL PROTEIN: 7.5 G/DL (ref 6.4–8.3)

## 2018-11-02 LAB
ANCA MYELOPEROXIDASE: 15 AU/ML
ANCA PROTEINASE 3: 20 AU/ML
P E INTERPRETATION, U: NORMAL
PATHOLOGIST: NORMAL
SPECIMEN TYPE: NORMAL
URINE TOTAL PROTEIN: 27 MG/DL

## 2018-11-15 ENCOUNTER — OFFICE VISIT (OUTPATIENT)
Dept: PAIN MANAGEMENT | Age: 63
End: 2018-11-15
Payer: COMMERCIAL

## 2018-11-15 VITALS
RESPIRATION RATE: 16 BRPM | SYSTOLIC BLOOD PRESSURE: 108 MMHG | BODY MASS INDEX: 22.96 KG/M2 | HEART RATE: 76 BPM | OXYGEN SATURATION: 99 % | WEIGHT: 155 LBS | HEIGHT: 69 IN | DIASTOLIC BLOOD PRESSURE: 72 MMHG

## 2018-11-15 DIAGNOSIS — F41.9 ANXIETY: ICD-10-CM

## 2018-11-15 DIAGNOSIS — R10.9 CHRONIC ABDOMINAL PAIN: Primary | ICD-10-CM

## 2018-11-15 DIAGNOSIS — G89.29 ENCOUNTER FOR CHRONIC PAIN MANAGEMENT: ICD-10-CM

## 2018-11-15 DIAGNOSIS — G89.29 CHRONIC ABDOMINAL PAIN: Primary | ICD-10-CM

## 2018-11-15 PROCEDURE — 99213 OFFICE O/P EST LOW 20 MIN: CPT | Performed by: NURSE PRACTITIONER

## 2018-11-15 RX ORDER — CLONAZEPAM 1 MG/1
TABLET ORAL
Qty: 90 TABLET | Refills: 0 | Status: SHIPPED | OUTPATIENT
Start: 2018-11-15 | End: 2018-12-20 | Stop reason: SDUPTHER

## 2018-11-15 ASSESSMENT — ENCOUNTER SYMPTOMS
ABDOMINAL PAIN: 1
VOMITING: 0
NAUSEA: 1
CONSTIPATION: 0
DIARRHEA: 1

## 2018-11-15 ASSESSMENT — PATIENT HEALTH QUESTIONNAIRE - PHQ9
SUM OF ALL RESPONSES TO PHQ QUESTIONS 1-9: 0
2. FEELING DOWN, DEPRESSED OR HOPELESS: 0
SUM OF ALL RESPONSES TO PHQ9 QUESTIONS 1 & 2: 0
1. LITTLE INTEREST OR PLEASURE IN DOING THINGS: 0
SUM OF ALL RESPONSES TO PHQ QUESTIONS 1-9: 0

## 2018-11-15 NOTE — PROGRESS NOTES
spine, T -1.9 spine 3.) DEXA, 03/10, T -0.2 spine, T -1.7 hip  4) DEXA 7/15 FRAX 6.7% 10 yr risk hip. [facility] prefers no Prolia due to infection risk, and GFR 30    Peripheral neuropathy     emg - severe    Pouchitis ([address]     Versus small bowel bacterial overgrowth, Dr. Katy Koehler specialist [facility]. 1.1) [facility]  for 12 days with dx being pouchitis. 1.2) panniculitis, , [facility]. 1.3) Repeat scope, Dr. Fran Garay, done on pouch  being okay. Repeat pouchoscopy &  [facility]    Primary sclerosing cholangitis     1.1) Following with liver transplant team at [facility] and with Dr. Trino Pereira, gastroenterologist at [facility]. 1.2) Multiple stent replacements in the biliary tract for this about every 6 months. 1.3) Liver transplant, . Dr Magdalena Corbin, Dr Tim Bunch, Transplant coordinator for labs etc. 1.4) Liver biopsy, .  SVT (supraventricular tachycardia) ([address]     ablated [facility]     Ulcerative colitis ([address]     Status post colectomy. 1.1) Colonoscopy , Dr. Lanny Dominguez, with evidence of marked chronic inflammatory changes. 1.2) Repeat pouchoscopy/colonoscopy, 06, Dr. Lanny Dominguez, with evidence of pouchitis. 1.3) Repeat colonoscopy, Dr. Martinez Blank, . 1.4) Repeat colonoscopy , Dr. Fran Garay. 1.5) Repeat colonoscopy, Dr. Fran Garay .  Vitamin D deficiency        Past Surgical History:   Procedure Laterality Date    ABDOMINAL HERNIA REPAIR      With extensive revision of scar from the  procedure, recurrent ventral hernia repair     APPENDECTOMY      ATRIAL ABLATION SURGERY  , 12/10/14    had it done twice.      BREAST BIOPSY Left     BREAST SURGERY      Biopsy x2    CATARACT REMOVAL WITH IMPLANT Right 2017    [facility]    CATARACT REMOVAL WITH IMPLANT Left 2017    [facility]     SECTION      three times    CHOLECYSTECTOMY

## 2018-11-19 ENCOUNTER — HOSPITAL ENCOUNTER (OUTPATIENT)
Dept: LAB | Age: 63
Discharge: HOME | End: 2018-11-19
Payer: COMMERCIAL

## 2018-11-19 LAB
ABSOLUTE EOS #: 0.1 K/UL (ref 0–0.4)
ABSOLUTE IMMATURE GRANULOCYTE: ABNORMAL K/UL (ref 0–0.3)
ABSOLUTE LYMPH #: 1.8 K/UL (ref 1–4.8)
ABSOLUTE MONO #: 0.4 K/UL (ref 0.1–1.2)
ALBUMIN SERPL-MCNC: 4.4 G/DL (ref 3.5–5.2)
ALBUMIN/GLOBULIN RATIO: 1.2 (ref 1–2.5)
ALP BLD-CCNC: 158 U/L (ref 35–104)
ALT SERPL-CCNC: 34 U/L (ref 5–33)
ANION GAP SERPL CALCULATED.3IONS-SCNC: 13 MMOL/L (ref 9–17)
AST SERPL-CCNC: 38 U/L
BASOPHILS # BLD: 1 % (ref 0–1)
BASOPHILS ABSOLUTE: 0 K/UL (ref 0–0.2)
BILIRUB SERPL-MCNC: 0.48 MG/DL (ref 0.3–1.2)
BUN BLDV-MCNC: 24 MG/DL (ref 8–23)
BUN/CREAT BLD: 18 (ref 9–20)
CALCIUM SERPL-MCNC: 9.9 MG/DL (ref 8.6–10.4)
CHLORIDE BLD-SCNC: 103 MMOL/L (ref 98–107)
CHOLESTEROL/HDL RATIO: 2.2
CHOLESTEROL: 168 MG/DL
CO2: 26 MMOL/L (ref 20–31)
CREAT SERPL-MCNC: 1.35 MG/DL (ref 0.5–0.9)
DIFFERENTIAL TYPE: ABNORMAL
EOSINOPHILS RELATIVE PERCENT: 1 % (ref 1–7)
GFR AFRICAN AMERICAN: 48 ML/MIN
GFR NON-AFRICAN AMERICAN: 40 ML/MIN
GFR SERPL CREATININE-BSD FRML MDRD: ABNORMAL ML/MIN/{1.73_M2}
GFR SERPL CREATININE-BSD FRML MDRD: ABNORMAL ML/MIN/{1.73_M2}
GGT: 540 U/L (ref 5–36)
GLUCOSE BLD-MCNC: 114 MG/DL (ref 70–99)
HCT VFR BLD CALC: 38.6 % (ref 36–46)
HDLC SERPL-MCNC: 76 MG/DL
HEMOGLOBIN: 12.5 G/DL (ref 12–16)
IMMATURE GRANULOCYTES: ABNORMAL %
LDL CHOLESTEROL: 74 MG/DL (ref 0–130)
LYMPHOCYTES # BLD: 39 % (ref 16–46)
MAGNESIUM: 1.8 MG/DL (ref 1.6–2.6)
MCH RBC QN AUTO: 29.6 PG (ref 26–34)
MCHC RBC AUTO-ENTMCNC: 32.4 G/DL (ref 31–37)
MCV RBC AUTO: 91.2 FL (ref 80–100)
MONOCYTES # BLD: 8 % (ref 4–11)
NRBC AUTOMATED: ABNORMAL PER 100 WBC
PDW BLD-RTO: 14.8 % (ref 11–14.5)
PHOSPHORUS: 3.6 MG/DL (ref 2.6–4.5)
PLATELET # BLD: 141 K/UL (ref 140–450)
PLATELET ESTIMATE: ABNORMAL
PMV BLD AUTO: 8.2 FL (ref 6–12)
POTASSIUM SERPL-SCNC: 3.8 MMOL/L (ref 3.7–5.3)
RBC # BLD: 4.24 M/UL (ref 4–5.2)
RBC # BLD: ABNORMAL 10*6/UL
SEG NEUTROPHILS: 51 % (ref 43–77)
SEGMENTED NEUTROPHILS ABSOLUTE COUNT: 2.4 K/UL (ref 1.8–7.7)
SODIUM BLD-SCNC: 142 MMOL/L (ref 135–144)
TOTAL PROTEIN: 8.1 G/DL (ref 6.4–8.3)
TRIGL SERPL-MCNC: 91 MG/DL
VLDLC SERPL CALC-MCNC: NORMAL MG/DL (ref 1–30)
WBC # BLD: 4.6 K/UL (ref 3.5–11)
WBC # BLD: ABNORMAL 10*3/UL

## 2018-11-19 PROCEDURE — 36415 COLL VENOUS BLD VENIPUNCTURE: CPT

## 2018-11-19 PROCEDURE — 80053 COMPREHEN METABOLIC PANEL: CPT

## 2018-11-19 PROCEDURE — 85025 COMPLETE CBC W/AUTO DIFF WBC: CPT

## 2018-11-19 PROCEDURE — 84100 ASSAY OF PHOSPHORUS: CPT

## 2018-11-19 PROCEDURE — 82977 ASSAY OF GGT: CPT

## 2018-11-19 PROCEDURE — 83735 ASSAY OF MAGNESIUM: CPT

## 2018-11-19 PROCEDURE — 80061 LIPID PANEL: CPT

## 2018-11-20 DIAGNOSIS — F32.9 MAJOR DEPRESSION, CHRONIC: ICD-10-CM

## 2018-11-27 RX ORDER — GABAPENTIN 800 MG/1
TABLET ORAL
Qty: 21 TABLET | Refills: 0 | Status: SHIPPED | OUTPATIENT
Start: 2018-11-27 | End: 2018-11-28 | Stop reason: SDUPTHER

## 2018-11-28 RX ORDER — GABAPENTIN 800 MG/1
TABLET ORAL
Qty: 270 TABLET | Refills: 3 | Status: SHIPPED | OUTPATIENT
Start: 2018-11-28 | End: 2021-06-30 | Stop reason: SDUPTHER

## 2018-12-11 RX ORDER — FAMOTIDINE 40 MG/1
TABLET, FILM COATED ORAL
Qty: 90 TABLET | Refills: 3 | Status: SHIPPED | OUTPATIENT
Start: 2018-12-11 | End: 2021-06-30

## 2018-12-11 NOTE — TELEPHONE ENCOUNTER
Left message for Shannon Vee RN with [facility] Critical access hospital team to return call - checking on status of our request. Ph. [phone]    Next ov 1/9/19 with Dr. Deuce Baker.

## 2018-12-14 ENCOUNTER — HOSPITAL ENCOUNTER (EMERGENCY)
Age: 63
Discharge: HOME | End: 2018-12-14
Attending: EMERGENCY MEDICINE
Payer: COMMERCIAL

## 2018-12-14 ENCOUNTER — APPOINTMENT (OUTPATIENT)
Dept: CT IMAGING | Age: 63
End: 2018-12-14
Payer: COMMERCIAL

## 2018-12-14 VITALS
OXYGEN SATURATION: 97 % | DIASTOLIC BLOOD PRESSURE: 76 MMHG | SYSTOLIC BLOOD PRESSURE: 116 MMHG | RESPIRATION RATE: 16 BRPM | HEART RATE: 72 BPM | TEMPERATURE: 99 F

## 2018-12-14 DIAGNOSIS — S16.1XXA CERVICAL STRAIN, ACUTE, INITIAL ENCOUNTER: ICD-10-CM

## 2018-12-14 DIAGNOSIS — S09.90XA INJURY OF HEAD, INITIAL ENCOUNTER: Primary | ICD-10-CM

## 2018-12-14 PROCEDURE — 99284 EMERGENCY DEPT VISIT MOD MDM: CPT

## 2018-12-14 PROCEDURE — 72125 CT NECK SPINE W/O DYE: CPT

## 2018-12-14 PROCEDURE — 70450 CT HEAD/BRAIN W/O DYE: CPT

## 2018-12-14 RX ORDER — SWAB
SWAB, NON-MEDICATED MISCELLANEOUS
COMMUNITY

## 2018-12-14 ASSESSMENT — PAIN DESCRIPTION - DESCRIPTORS: DESCRIPTORS: THROBBING;SHOOTING

## 2018-12-14 ASSESSMENT — PAIN DESCRIPTION - LOCATION: LOCATION: HEAD

## 2018-12-14 ASSESSMENT — ENCOUNTER SYMPTOMS
COUGH: 0
VOMITING: 0
BLOOD IN STOOL: 0
ABDOMINAL PAIN: 0
DIARRHEA: 0
BACK PAIN: 0
EYE PAIN: 0
SHORTNESS OF BREATH: 0
NAUSEA: 0
CONSTIPATION: 0

## 2018-12-14 ASSESSMENT — PAIN SCALES - GENERAL
PAINLEVEL_OUTOF10: 9
PAINLEVEL_OUTOF10: 8

## 2018-12-14 ASSESSMENT — PAIN DESCRIPTION - PAIN TYPE: TYPE: ACUTE PAIN

## 2018-12-14 ASSESSMENT — PAIN DESCRIPTION - ORIENTATION: ORIENTATION: POSTERIOR

## 2018-12-14 NOTE — ED PROVIDER NOTES
[id number] [facility]  eMERGENCY dEPARTMENT eNCOUnter      Pt Name: Layla Mcduffie  MRN: [id number]  [place] 1955  Date of evaluation: 12/14/2018      CHIEF COMPLAINT       Chief Complaint   Patient presents with   272 Yair Gonzalez is a 66 y.o. female who presents After a fall, she states she was putting up [place] ornaments on her tree and was about countertop height high when she fell landing straight on her head was no loss of consciousness but she was days ago for about 10 minutes complains of pain in the back of her head and upper neck denies any chest pain any bowel pain any numbness tingling or paresthesias she was amateur about herself here      REVIEW OF SYSTEMS         Review of Systems   Constitutional: Negative for chills and fever. HENT: Negative for congestion and ear pain. Eyes: Negative for pain and visual disturbance. Respiratory: Negative for cough and shortness of breath. Cardiovascular: Negative for chest pain, palpitations and leg swelling. Gastrointestinal: Negative for abdominal pain, blood in stool, constipation, diarrhea, nausea and vomiting. Endocrine: Negative for polydipsia and polyuria. Genitourinary: Negative for difficulty urinating, dysuria, frequency, vaginal bleeding and vaginal discharge. Musculoskeletal: Negative for back pain, joint swelling, myalgias, neck pain and neck stiffness. Skin: Negative for rash. Neurological: Positive for headaches. Negative for dizziness and weakness. Hematological: Negative for adenopathy. Does not bruise/bleed easily. Psychiatric/Behavioral: Negative for confusion, self-injury and suicidal ideas. PAST MEDICAL HISTORY    has a past medical history of Anxiety; Breast disease; Choroidal neovascularization; Chronic abdominal pain; Chronic kidney disease; Depression; Diabetes mellitus, type 2 ([address] Diarrhea; Dyslipidemia;  Fistula involving female genital tract; GERD (gastroesophageal reflux disease); Headache(784.0); Herniated cervical disc; History of atrial fibrillation; Hypertension; Hypothyroidism; Interstitial cystitis; Iron deficiency anemia; Leukocytoclastic vasculitis ([facility] [address] Liver transplanted ([address] MGUS (monoclonal gammopathy of unknown significance); Migraine headache; Mitral valve regurgitation; Osteoporosis; Peripheral neuropathy; Pouchitis ([address] Primary sclerosing cholangitis; SVT (supraventricular tachycardia) ([address] Ulcerative colitis ([address] and Vitamin D deficiency. SURGICAL HISTORY      has a past surgical history that includes Colon surgery; Breast surgery; liver biopsy; Liver transplant (); Abdominal hernia repair (); ventral hernia repair (); hernia repair (2014, revision );  section; Colonoscopy; Upper gastrointestinal endoscopy; Cholecystectomy; Appendectomy; Atrial ablation surgery (, 12/10/14); eye surgery; Breast biopsy (Left, ); retinal laser (Bilateral); Cataract removal with implant (Right, 2017); Cataract removal with implant (Left, 2017); pr remv cataract extracap,insert lens (Right, 2017); and pr remv cataract extracap,insert lens (Left, 2017). CURRENT MEDICATIONS       Previous Medications    ATORVASTATIN (LIPITOR) 10 MG TABLET    Take 1 tablet by mouth daily    CALCIUM CARBONATE (OSCAL) 500 MG TABS TABLET    Take 500 mg by mouth 2 times daily    CLONAZEPAM (KLONOPIN) 1 MG TABLET    TAKE 1 TABLET BY MOUTH THREE TIMES DAILY AS NEEDED. FAMOTIDINE (PEPCID) 40 MG TABLET    TAKE 1 TABLET NIGHTLY AS NEEDED    GABAPENTIN (NEURONTIN) 800 MG TABLET    TAKE 1 TABLET THREE TIMES A DAY. GLUCOSE BLOOD VI TEST STRIPS (ASCENSIA AUTODISC VI;ONE TOUCH ULTRA TEST VI) STRIP    One touch ultra test strips. Test up to 4 times a day. HYDROMORPHONE (DILAUDID) 2 MG TABLET    Take 1 tablet by mouth 3 times daily as needed for Pain .     LEVOTHYROXINE (SYNTHROID) 125 MCG TABLET    take 1 tablet by mouth once daily    OMEPRAZOLE (PRILOSEC) 20 MG DELAYED RELEASE CAPSULE    TAKE 1 CAPSULE TWICE A DAY    ONDANSETRON (ZOFRAN ODT) 4 MG DISINTEGRATING TABLET    Take 1 tablet by mouth every 8 hours as needed for Nausea    PROGRAF 1 MG CAPSULE    Take 1 mg by mouth 2 times daily. RALOXIFENE (EVISTA) 60 MG TABLET    TAKE 1 TABLET DAILY    SERTRALINE (ZOLOFT) 100 MG TABLET    TAKE 1 TABLET DAILY  ALONG WITH A 50 MG TABLET (TOTAL  MG DAILY)    SERTRALINE (ZOLOFT) 50 MG TABLET    Take 1 tablet by mouth daily Take along with zoloft 100 mg daily (total 150 mg daily)    SULFAMETHOXAZOLE-TRIMETHOPRIM (BACTRIM DS;SEPTRA DS) 800-160 MG PER TABLET    TAKE ONE-HALF (1/2) TABLET 5 DAYS A WEEK ( MONDAY THROUGH FRIDAY )    TINIDAZOLE (TINDAMAX) 250 MG TABLET    Take 250 mg by mouth daily    VITAMIN D, CHOLECALCIFEROL, 400 UNITS CAPS    Take by mouth       ALLERGIES     is allergic to codeine; tylenol with codeine #3 [acetaminophen-codeine]; vicodin [hydrocodone-acetaminophen]; ace inhibitors; aspirin; azulfidine [sulfasalazine]; roberta-d [diphenhydramine]; elmiron [pentosan polysulfate sodium]; fentanyl; medrol [methylprednisolone]; morphine; other; reglan [metoclopramide]; tramadol; and phenergan [promethazine]. FAMILY HISTORY     indicated that her mother is . She indicated that her father is . She indicated that the status of her other is unknown. She indicated that the status of her neg hx is unknown.      family history includes COPD in her mother; Cirrhosis in an other family member; Diabetes in her father; Heart Disease in her mother. SOCIAL HISTORY      reports that she quit smoking about 42 years ago. She has a 1.60 pack-year smoking history. She has never used smokeless tobacco. She reports that she does not drink alcohol or use drugs. PHYSICAL EXAM     INITIAL VITALS:  temperature is 99 °F (37.2 °C). Her blood pressure is 118/86 and her pulse is 94.  Her respiration is 16 and oxygen

## 2018-12-20 DIAGNOSIS — F41.9 ANXIETY: ICD-10-CM

## 2018-12-20 RX ORDER — CLONAZEPAM 1 MG/1
TABLET ORAL
Qty: 90 TABLET | Refills: 0 | Status: SHIPPED | OUTPATIENT
Start: 2018-12-20 | End: 2018-12-22 | Stop reason: SDUPTHER

## 2018-12-22 DIAGNOSIS — F41.9 ANXIETY: ICD-10-CM

## 2018-12-27 RX ORDER — CLONAZEPAM 1 MG/1
TABLET ORAL
Qty: 90 TABLET | Refills: 0 | Status: SHIPPED | OUTPATIENT
Start: 2018-12-27 | End: 2019-02-05 | Stop reason: SDUPTHER

## 2019-01-17 RX ORDER — SULFAMETHOXAZOLE AND TRIMETHOPRIM 800; 160 MG/1; MG/1
TABLET ORAL
Qty: 30 TABLET | Refills: 3 | Status: SHIPPED | OUTPATIENT
Start: 2019-01-17

## 2019-02-05 DIAGNOSIS — F41.9 ANXIETY: ICD-10-CM

## 2019-02-05 RX ORDER — CLONAZEPAM 1 MG/1
TABLET ORAL
Qty: 90 TABLET | Refills: 0 | Status: SHIPPED | OUTPATIENT
Start: 2019-02-05 | End: 2019-03-22 | Stop reason: SDUPTHER

## 2019-03-04 ENCOUNTER — OFFICE VISIT (OUTPATIENT)
Dept: NEPHROLOGY | Age: 64
End: 2019-03-04
Payer: COMMERCIAL

## 2019-03-04 VITALS
HEIGHT: 69 IN | WEIGHT: 156.6 LBS | HEART RATE: 70 BPM | BODY MASS INDEX: 23.19 KG/M2 | DIASTOLIC BLOOD PRESSURE: 70 MMHG | SYSTOLIC BLOOD PRESSURE: 108 MMHG

## 2019-03-04 DIAGNOSIS — Z94.4 LIVER TRANSPLANTED (HCC): ICD-10-CM

## 2019-03-04 DIAGNOSIS — I10 ESSENTIAL HYPERTENSION: ICD-10-CM

## 2019-03-04 DIAGNOSIS — N18.30 CHRONIC RENAL FAILURE, STAGE 3 (MODERATE) (HCC): Primary | ICD-10-CM

## 2019-03-04 DIAGNOSIS — K83.01 PRIMARY SCLEROSING CHOLANGITIS: ICD-10-CM

## 2019-03-04 DIAGNOSIS — E55.9 VITAMIN D DEFICIENCY: ICD-10-CM

## 2019-03-04 DIAGNOSIS — E11.21 CONTROLLED TYPE 2 DIABETES MELLITUS WITH DIABETIC NEPHROPATHY, WITHOUT LONG-TERM CURRENT USE OF INSULIN (HCC): ICD-10-CM

## 2019-03-04 PROCEDURE — 99213 OFFICE O/P EST LOW 20 MIN: CPT | Performed by: INTERNAL MEDICINE

## 2019-03-22 DIAGNOSIS — F41.9 ANXIETY: ICD-10-CM

## 2019-03-22 RX ORDER — CLONAZEPAM 1 MG/1
TABLET ORAL
Qty: 90 TABLET | Refills: 0 | Status: SHIPPED | OUTPATIENT
Start: 2019-03-22 | End: 2019-05-06 | Stop reason: SDUPTHER

## 2019-05-06 DIAGNOSIS — F41.9 ANXIETY: ICD-10-CM

## 2019-05-06 RX ORDER — CLONAZEPAM 1 MG/1
TABLET ORAL
Qty: 90 TABLET | Refills: 0 | Status: SHIPPED | OUTPATIENT
Start: 2019-05-06 | End: 2019-07-18 | Stop reason: SDUPTHER

## 2019-05-06 NOTE — TELEPHONE ENCOUNTER
Controlled Substances Monitoring    The prescription monitoring report for this patient has been reviewed today. No signs of potential drug abuse or diversion identified.

## 2019-05-14 ENCOUNTER — TELEPHONE (OUTPATIENT)
Dept: INTERNAL MEDICINE | Age: 64
End: 2019-05-14

## 2019-05-14 ENCOUNTER — OFFICE VISIT (OUTPATIENT)
Dept: INTERNAL MEDICINE | Age: 64
End: 2019-05-14
Payer: COMMERCIAL

## 2019-05-14 VITALS
SYSTOLIC BLOOD PRESSURE: 106 MMHG | HEART RATE: 84 BPM | WEIGHT: 153 LBS | BODY MASS INDEX: 22.66 KG/M2 | DIASTOLIC BLOOD PRESSURE: 64 MMHG | HEIGHT: 69 IN

## 2019-05-14 DIAGNOSIS — R93.1 DECREASED CARDIAC EJECTION FRACTION: ICD-10-CM

## 2019-05-14 DIAGNOSIS — I48.91 ATRIAL FIBRILLATION WITH RVR (HCC): ICD-10-CM

## 2019-05-14 DIAGNOSIS — I34.0 MITRAL VALVE INSUFFICIENCY, UNSPECIFIED ETIOLOGY: ICD-10-CM

## 2019-05-14 DIAGNOSIS — E78.5 DYSLIPIDEMIA: ICD-10-CM

## 2019-05-14 DIAGNOSIS — N18.4 CHRONIC KIDNEY DISEASE, STAGE 4 (SEVERE) (HCC): ICD-10-CM

## 2019-05-14 DIAGNOSIS — E03.9 HYPOTHYROIDISM, UNSPECIFIED TYPE: ICD-10-CM

## 2019-05-14 DIAGNOSIS — I10 ESSENTIAL HYPERTENSION: ICD-10-CM

## 2019-05-14 DIAGNOSIS — E55.9 VITAMIN D DEFICIENCY: ICD-10-CM

## 2019-05-14 DIAGNOSIS — R11.0 NAUSEA: ICD-10-CM

## 2019-05-14 DIAGNOSIS — D69.6 THROMBOCYTOPENIA (HCC): ICD-10-CM

## 2019-05-14 DIAGNOSIS — F32.9 MAJOR DEPRESSION, CHRONIC: ICD-10-CM

## 2019-05-14 DIAGNOSIS — Z94.4 LIVER TRANSPLANTED (HCC): Primary | ICD-10-CM

## 2019-05-14 DIAGNOSIS — K83.01 PRIMARY SCLEROSING CHOLANGITIS: ICD-10-CM

## 2019-05-14 DIAGNOSIS — M81.0 OSTEOPOROSIS WITHOUT CURRENT PATHOLOGICAL FRACTURE, UNSPECIFIED OSTEOPOROSIS TYPE: ICD-10-CM

## 2019-05-14 DIAGNOSIS — N82.9 FISTULA INVOLVING FEMALE GENITAL TRACT: ICD-10-CM

## 2019-05-14 DIAGNOSIS — E11.21 CONTROLLED TYPE 2 DIABETES MELLITUS WITH DIABETIC NEPHROPATHY, WITHOUT LONG-TERM CURRENT USE OF INSULIN (HCC): ICD-10-CM

## 2019-05-14 DIAGNOSIS — F41.9 ANXIETY: ICD-10-CM

## 2019-05-14 PROCEDURE — 99214 OFFICE O/P EST MOD 30 MIN: CPT | Performed by: INTERNAL MEDICINE

## 2019-05-14 NOTE — PATIENT INSTRUCTIONS
Patient Education        Learning About Diabetes Food Guidelines  Your Care Instructions    Meal planning is important to manage diabetes. It helps keep your blood sugar at a target level (which you set with your doctor). You don't have to eat special foods. You can eat what your family eats, including sweets once in a while. But you do have to pay attention to how often you eat and how much you eat of certain foods. You may want to work with a dietitian or a certified diabetes educator (CDE) to help you plan meals and snacks. A dietitian or CDE can also help you lose weight if that is one of your goals. What should you know about eating carbs? Managing the amount of carbohydrate (carbs) you eat is an important part of healthy meals when you have diabetes. Carbohydrate is found in many foods. · Learn which foods have carbs. And learn the amounts of carbs in different foods. ? Bread, cereal, pasta, and rice have about 15 grams of carbs in a serving. A serving is 1 slice of bread (1 ounce), ½ cup of cooked cereal, or 1/3 cup of cooked pasta or rice. ? Fruits have 15 grams of carbs in a serving. A serving is 1 small fresh fruit, such as an apple or orange; ½ of a banana; ½ cup of cooked or canned fruit; ½ cup of fruit juice; 1 cup of melon or raspberries; or 2 tablespoons of dried fruit. ? Milk and no-sugar-added yogurt have 15 grams of carbs in a serving. A serving is 1 cup of milk or 2/3 cup of no-sugar-added yogurt. ? Starchy vegetables have 15 grams of carbs in a serving. A serving is ½ cup of mashed potatoes or sweet potato; 1 cup winter squash; ½ of a small baked potato; ½ cup of cooked beans; or ½ cup cooked corn or green peas. · Learn how much carbs to eat each day and at each meal. A dietitian or CDE can teach you how to keep track of the amount of carbs you eat. This is called carbohydrate counting. · If you are not sure how to count carbohydrate grams, use the Plate Method to plan meals.  It is a when cooking. · Don't skip meals. Your blood sugar may drop too low if you skip meals and take insulin or certain medicines for diabetes. · Check with your doctor before you drink alcohol. Alcohol can cause your blood sugar to drop too low. Alcohol can also cause a bad reaction if you take certain diabetes medicines. Follow-up care is a key part of your treatment and safety. Be sure to make and go to all appointments, and call your doctor if you are having problems. It's also a good idea to know your test results and keep a list of the medicines you take. Where can you learn more? Go to [email or web address].[email or web address] org and sign in to your [employer] account. Enter O648 in the Codefied box to learn more about \"Learning About Diabetes Food Guidelines. \"     If you do not have an account, please click on the \"Sign Up Now\" link. Current as of: July 25, 2018  Content Version: 12.0  © 6047-2365 [employer]. Care instructions adapted under license by [facility] If you have questions about a medical condition or this instruction, always ask your healthcare professional. [place] [address] any warranty or liability for your use of this information.

## 2019-05-14 NOTE — PROGRESS NOTES
Kate received counseling on the following healthy behaviors: exercise  Reviewed prior labs and health maintenance  Continue current medications except where noted below, diet and exercise. Discussed use, benefit, and side effects of prescribed medications. Barriers to medication compliance addressed. Patient given educational materials - see patient instructions  Was a self-tracking handout given in paper form or via [employer]? Yes    Requested Prescriptions      No prescriptions requested or ordered in this encounter       All patient questions answered. Patient voiced understanding. Quality Measures    Body mass index is 22.59 kg/m². Normal. Weight control planned discussed: Healthy diet and regular exercise    BP: 106/64 Blood pressure is normal. Treatment plan consists of: see progress note below.       Lab Results   Component Value Date    LDLCALC 61 03/17/2017    LDLCHOLESTEROL 74 11/19/2018    (goal LDL reduction with dx if diabetes is 50% LDL reduction)      PHQ Scores 11/15/2018 9/24/2018 11/27/2017 5/24/2016   PHQ2 Score 0 2 0 2   PHQ9 Score 0 2 0 2     Interpretation of Total Score Depression Severity: 1-4 = Minimal depression, 5-9 = Mild depression, 10-14 = Moderate depression, 15-19 = Moderately severe depression, 20-27 = Severe depression

## 2019-05-20 NOTE — PROGRESS NOTES
antidepressant medications. · Thyroid Disease  - She has been doing fine with her thyroid medication and takes it quite regularly. Review of Systems:  Constitutional:  Negative for chills, fever, and weight loss. HENT:  Negative for congestion, ear pain, and sore throat. Eyes:  Negative for blurred vision, double vision, pain and discharge. Respiratory:  Negative for cough, shortness of breath, and wheezing. Cardiovascular:  Negative for chest pain, palpitations, and PND. Gastrointestinal:  Negative for abdominal pain, blood in stool, constipation, diarrhea, and vomiting. Positive for nausea. Genitourinary:  Negative for dysuria, frequency, and hematuria. Musculoskeletal:  Negative for myalgias. Skin:  Negative for rash. Neurological:  Negative for tingling, sensory change, speech change, focal weakness, seizures, and headaches. Endo/Heme/Allergies:  Does not bruise/bleed easily. Psychiatric/Behavioral:  Negative for hallucinations and suicidal ideas.      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    Pseudophakia of both eyes    Diabetes type 2, no ocular involvement ([facility])    Chronic kidney disease, stage 4 (severe) (HCC)       Medications:    Current Outpatient Medications   Medication Sig Dispense Refill    clonazePAM (KLONOPIN) 1 MG tablet take 1 tablet by mouth three times a day if needed 90 tablet 0    sulfamethoxazole-trimethoprim (BACTRIM DS;SEPTRA DS) 800-160 MG per tablet TAKE ONE-HALF (1/2) TABLET 5 DAYS A WEEK ( MONDAY THROUGH FRIDAY ) 30 tablet 3    omeprazole (PRILOSEC) 20 MG delayed release capsule TAKE 1 CAPSULE TWICE A  capsule 0    Vitamin D, Cholecalciferol, 400 units CAPS Take by mouth      famotidine (PEPCID) 40 MG tablet TAKE 1 TABLET NIGHTLY AS NEEDED 90 tablet 3    gabapentin (NEURONTIN) 800 MG tablet TAKE 1 TABLET THREE TIMES A DAY. 270 tablet 3    sertraline (ZOLOFT) 50 MG tablet Take 1 tablet by mouth daily Take along with zoloft 100 mg daily (total 150 mg daily) 90 tablet 3    sertraline (ZOLOFT) 100 MG tablet TAKE 1 TABLET DAILY  ALONG WITH A 50 MG TABLET (TOTAL  MG DAILY) 90 tablet 3    levothyroxine (SYNTHROID) 125 MCG tablet take 1 tablet by mouth once daily 30 tablet 11    raloxifene (EVISTA) 60 MG tablet TAKE 1 TABLET DAILY 90 tablet 3    tinidazole (TINDAMAX) 250 MG tablet Take 250 mg by mouth daily      ondansetron (ZOFRAN ODT) 4 MG disintegrating tablet Take 1 tablet by mouth every 8 hours as needed for Nausea 20 tablet 0    calcium carbonate (OSCAL) 500 MG TABS tablet Take 500 mg by mouth 2 times daily      PROGRAF 1 MG capsule Take 1 mg by mouth 2 times daily.  glucose blood VI test strips (ASCENSIA AUTODISC VI;ONE TOUCH ULTRA TEST VI) strip One touch ultra test strips. Test up to 4 times a day. 100 each 11     No current facility-administered medications for this visit.         Past Medical History:        Diagnosis Date    Anxiety     Breast disease     History of fibroadenomatous breast disease with biopsy x 2.    Choroidal neovascularization     Peripapillary with hemorrhage, Dr. Arya Perla, 2012    Chronic abdominal pain     [place] has attributed to pouchitis and possible adhesions. Extensive repeated eval.  EGD 9/13. dr Cathy Bustamante also following  Repeat egd 11/28/14 and , Pouchoscopy 11/28/14 Dr Kyle Carrasco  EGD- patchy gastritis, repeat EGD 4/16 Dr Maria De Jesus Albert 9/15 [facility]    Chronic kidney disease     1.)Creatine clearance 58 05/06 and total urine protein 405. 2.)Dr. Kye Frederick, [address] [place] [address], felt this was secondary to Prograf and rapamune 6/06, 3.)history of hyperkalemia leading to discontinuatuin of ACE inhibitor. 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 Eval Dr. Aby Ortiz clinic 2013 recommended watchful waiting. 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  GERD (gastroesophageal reflux disease)     EGD 9/13. dr Cathy Bustamante also following  Repeat egd 11/28/14 and ,  EGD- patchy gastritis, repeat EGD 4/16 Dr Corina Martin Headache(784.0)     Herniated cervical disc 03/2009    C6-C7 with compression of the thecal sac most significantly on MRI. 1.1) Dr. Julissa almanza recommended a 2nd opinion from neurology. 1.2) Had a 2nd opinion for Dr. Timi Hood who referred to a neurosurgeon in [place] for another opinion. 1.3)  That surgeon, Dr. Sundeep Rosas felt surgery was not warranted and he would recommend PT or possible injection therapy.  History of atrial fibrillation     Stress test negative October 2014. Echo with moderate MR normal ejection fraction September 2014. Ablation performed December 2014.  Hypertension     Hypothyroidism     Interstitial cystitis     Dr. Jess Graham    Iron deficiency anemia     Leukocytoclastic vasculitis ([address]     possible    Liver transplanted [facility])     [facility] following.   Liver Bx 9/13    MGUS (monoclonal gammopathy of unknown significance) [place], Dr. Brock PALACIOS nl 2009    Migraine headache     evaluation, Dr Zarina Aguilar, [facility] Mitral valve regurgitation     mod on echo 9/14, & 4/16, stable 8/17    Osteoporosis     1.) DEXA, 03/05, T -1.1 spine, T -3.1 hip 2.) DEXA, 12/07, T -0.8 spine, T -1.9 spine 3.) DEXA, 03/10, T -0.2 spine, T -1.7 hip  4) DEXA 7/15 FRAX 6.7% 10 yr risk hip. [facility] prefers no Prolia due to infection risk, and GFR 30    Peripheral neuropathy     emg 2014- severe    Pouchitis ([address] 2006    Versus small bowel bacterial overgrowth, Dr. Dewey Snider specialist [facility]. 1.1) [facility] 8/07 for 12 days with dx being pouchitis. 1.2) panniculitis, 8/07, [facility]. 1.3) Repeat scope, Dr. Anup Pavon, done on pouch 6/11 being okay. Repeat pouchoscopy 9/13& 4/16 [facility]    Primary sclerosing cholangitis     1.1) Following with liver transplant team at [facility] and with Dr. Cholo Cash, gastroenterologist at [facility]. 1.2) Multiple stent replacements in the biliary tract for this about every 6 months. 1.3) Liver transplant, 11/04. Dr Juan Armenta, Dr Debbie Boyd, Transplant coordinator for labs etc. 1.4) Liver biopsy, 6/06.  SVT (supraventricular tachycardia) ([address]     ablated [facility] 2004    Ulcerative colitis ([address]     Status post colectomy. 1.1) Colonoscopy 1/04, Dr. Bindu Owusu, with evidence of marked chronic inflammatory changes. 1.2) Repeat pouchoscopy/colonoscopy, 1/4/06, Dr. Bindu Owusu, with evidence of pouchitis. 1.3) Repeat colonoscopy, Dr. Ravi Sood, 6/06. 1.4) Repeat colonoscopy 8/09, Dr. Anup Pavon. 1.5) Repeat colonoscopy, Dr. Anup Pavon 04/12.  Vitamin D deficiency        Family Hx & Social HX    family history includes COPD in her mother; Cirrhosis in an other family member; Diabetes in her father; Heart Disease in her mother.   Social History     Tobacco Use   Smoking Status Former Smoker    Packs/day: 0.80    Years: 2.00    Pack years: 1.60    Last attempt to quit: 1976    Years since quittin.4   Smokeless Tobacco Never Used     Social History     Substance and Sexual Activity   Alcohol Use No       Vitals:    19 0805   BP: 106/64   Site: Left Upper Arm   Position: Sitting   Cuff Size: Large Adult   Pulse: 84   Weight: 153 lb (69.4 kg)   Height: 5' 9\" (1.753 m)        Vitals Reviewed. Body mass index is 22.59 kg/m². Wt Readings from Last 3 Encounters:   19 153 lb (69.4 kg)   19 156 lb 9.6 oz (71 kg)   11/15/18 155 lb (70.3 kg)     BP Readings from Last 3 Encounters:   19 106/64   19 108/70   18 116/76     Physical Examination:  Constitutional:  In general, this is a very pleasant 66 y.o. female in no acute distress. HENT:  Head: Normocephalic and atraumatic. Right Ear:  External ear normal.  Left Ear:  External ear normal.  Nose:  Nose normal.  Mouth/Throat:  Oropharynx is clear and moist.  Eyes: Conjunctivae and EOM are normal.  Pupils are equal, round, and reactive to light. Right eye exhibits no discharge. Left eye exhibits no discharge. No scleral icterus. Neck:  Normal range of motion. Neck supple. No JVD present. No tracheal deviation present. No thyromegaly present. No carotid bruit. Cardiovascular:  Regular rate and rhythm. Normal heart sounds, and intact distal pulses. Exam reveals no gallop. Pulmonary/Chest:  Effort normal and breath sounds normal.  No respiratory distress. She has no wheezes. She has no rales. Abdominal:  Soft. Normal aorta and bowel sounds are normal.  She exhibits no distension and no mass. There is no hepatosplenomegaly. There is no tenderness. There is no rebound and no guarding. Musculoskeletal:  She exhibits no edema or tenderness. Diabetic Foot Exam:  Monofilament testing was normal.  Posterior tibial and dorsalis pedis pulses 2+. Integrity of skin intact. Lymphadenopathy:    She has no cervical adenopathy. Neurological:  She is alert.   She has normal strength. She displays normal reflexes. No cranial nerve deficit or sensory deficit. She exhibits normal muscle tone. Skin:  Skin is warm and dry. No rash noted. She is not diaphoretic. No pallor. Psychiatric:  She has a normal mood and affect. Her behavior is normal.  Judgment normal.       Lab Results   Component Value Date    K 3.8 11/19/2018    CREATININE 1.35 11/19/2018    AST 38 11/19/2018    ALT 34 11/19/2018    TSH 0.55 06/18/2018    HCT 38.6 11/19/2018    LABA1C 5.6 09/24/2018    MICROALBUR 23 07/25/2017    GLUCOSE 114 11/19/2018    MG 1.8 11/19/2018    CALCIUM 9.9 11/19/2018    FLRRSZXK21 1734 05/24/2018    VITD25 55.1 10/31/2018      Lab Results   Component Value Date    CHOL 168 11/19/2018    CHOL 143 03/17/2017    TRIG 91 11/19/2018    HDL 76 11/19/2018    LDLCHOLESTEROL 74 11/19/2018     Labs reviewed and discussed with the patient? Yes    Assessment/Plan:    1. Liver transplanted  2. Primary sclerosing cholangitis  3. Nausea  She had been reasonably stable. Following with Dr. Zuleyka Rg, the GI specialist at [facility], and Dr. Giovanna Rollins who is the transplant specialty doctor. She needs to have the lab work done. We have a Chem-12, CBC, TSH, and HA1c all pending and she is going to send [facility] the Prograf level as well when she has that lab done. She also is due for followup in [facility] and with the chronic nausea I would recommend she get up there for further evaluation. She is in agreement with this plan. Additionally, the computer is triggering that she needs the hepatitis B vaccination. She is certain she had this. We are going to see if we can obtain this from [facility] for our record. - Comprehensive Metabolic Panel; Future  - CBC Auto Differential; Future  - TSH without Reflex; Future    4. Chronic kidney disease, stage 4 (severe) (HCC)  Stable. Labs pending. Also following with Dr. Leila Orellana, the nephrologist here, for the chronic kidney disease.     5. Major depression, chronic  Doing well on the SSRI. No change. 6. Controlled type 2 diabetes mellitus with diabetic nephropathy, without long-term current use of insulin ([facility] [address]  HA1c pending. Appears to be doing well. -  DIABETES FOOT EXAM  - Hemoglobin A1C; Future    7. Osteoporosis without current pathological fracture, unspecified osteoporosis type  On Evista, calcium and vitamin D.  DEXA scan will be due November 2019.    8. Dyslipidemia  Doing well on the statin. Recent lipids reviewed. 9. Vitamin D deficiency  Continue supplement. Continue to monitor. 10. Decreased cardiac ejection fraction  11. Mitral valve insufficiency, unspecified etiology  12. Atrial fibrillation with RVR ([facility] [address]  Recent echo reviewed from September 2018. Appears stable from this standpoint. She has followup with Dr. Sharmila Murray. 13. Essential hypertension  Stable. Continue to monitor. 15. 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 Stable. 15. Thrombocytopenia ([facility] [address]  Stable. Continue to monitor. CBC pending. 16. Hypothyroidism, unspecified type  Doing well on Synthroid. TSH pending. 17. Anxiety  Stable. Continue to monitor. 18.  Try to get the record of the hepatitis B vaccine from [facility]. 19.  We discussed my departure from [facility]. Although she is a complex case following with the specialists at [address] [place] [address] think there is significant value for her having a primary provider here at [facility] and after discussion she would like to establish with Dr. Kingston Shelby and we are going to see if we can get that set up for her. She will call if any problems prior to return. No orders of the defined types were placed in this encounter.        Tj Montes am personally transcribing for Duke Pollock MD 5/20/19 at 11:17 AM.    I, Duke Pollock MD, personally performed the services described in this document as transcribed by Julienne Alejo, and it is both accurate and complete.     Electronically signed by Patrice Perez MD 5/20/2019 at 2:45 PM.

## 2019-05-30 DIAGNOSIS — E03.9 HYPOTHYROIDISM, UNSPECIFIED TYPE: Primary | ICD-10-CM

## 2019-05-30 RX ORDER — LEVOTHYROXINE SODIUM 137 UG/1
137 TABLET ORAL DAILY
Qty: 30 TABLET | Refills: 5 | Status: SHIPPED | OUTPATIENT
Start: 2019-05-30 | End: 2021-06-30

## 2019-07-18 DIAGNOSIS — F41.9 ANXIETY: ICD-10-CM

## 2019-07-18 RX ORDER — CLONAZEPAM 1 MG/1
TABLET ORAL
Qty: 90 TABLET | Refills: 0 | OUTPATIENT
Start: 2019-07-18 | End: 2022-08-10

## 2021-06-30 ENCOUNTER — OFFICE VISIT (OUTPATIENT)
Dept: CARDIOLOGY | Age: 66
End: 2021-06-30
Payer: COMMERCIAL

## 2021-06-30 VITALS
BODY MASS INDEX: 22.48 KG/M2 | WEIGHT: 157 LBS | HEIGHT: 70 IN | SYSTOLIC BLOOD PRESSURE: 116 MMHG | HEART RATE: 67 BPM | DIASTOLIC BLOOD PRESSURE: 58 MMHG

## 2021-06-30 DIAGNOSIS — I48.91 ATRIAL FIBRILLATION, UNSPECIFIED TYPE (HCC): ICD-10-CM

## 2021-06-30 DIAGNOSIS — R07.9 CHEST PAIN, UNSPECIFIED TYPE: Primary | ICD-10-CM

## 2021-06-30 DIAGNOSIS — I34.0 MITRAL VALVE INSUFFICIENCY, UNSPECIFIED ETIOLOGY: ICD-10-CM

## 2021-06-30 PROCEDURE — 93000 ELECTROCARDIOGRAM COMPLETE: CPT | Performed by: INTERNAL MEDICINE

## 2021-06-30 PROCEDURE — 99204 OFFICE O/P NEW MOD 45 MIN: CPT | Performed by: INTERNAL MEDICINE

## 2021-06-30 RX ORDER — BIOTIN 10000 MCG
CAPSULE ORAL
COMMUNITY

## 2021-06-30 RX ORDER — MYCOPHENOLATE MOFETIL 250 MG/1
CAPSULE ORAL
COMMUNITY
Start: 2021-06-21

## 2021-06-30 RX ORDER — PREDNISONE 10 MG/1
10 TABLET ORAL DAILY
COMMUNITY

## 2021-06-30 RX ORDER — INSULIN HUMAN 100 [IU]/ML
INJECTION, SUSPENSION SUBCUTANEOUS
COMMUNITY
Start: 2021-04-16

## 2021-06-30 RX ORDER — PANTOPRAZOLE SODIUM 40 MG/1
40 TABLET, DELAYED RELEASE ORAL DAILY
COMMUNITY

## 2021-06-30 RX ORDER — INSULIN LISPRO 100 [IU]/ML
INJECTION, SOLUTION INTRAVENOUS; SUBCUTANEOUS
COMMUNITY
Start: 2021-04-12

## 2021-06-30 RX ORDER — LEVOTHYROXINE SODIUM 137 UG/1
TABLET ORAL DAILY
COMMUNITY

## 2021-06-30 RX ORDER — SERTRALINE HYDROCHLORIDE 100 MG/1
100 TABLET, FILM COATED ORAL DAILY
COMMUNITY

## 2021-06-30 NOTE — PROGRESS NOTES
08/02/2017); Cataract removal with implant (Left, 08/30/2017); pr xcapsl ctrc rmvl insj io lens prosth w/o ecp (Right, 08/02/2017); pr xcapsl ctrc rmvl insj io lens prosth w/o ecp (Left, 08/30/2017); and Kidney transplant. Home Medications:  Prior to Admission medications    Medication Sig Start Date End Date Taking? Authorizing Provider   pantoprazole (PROTONIX) 40 MG tablet Take 40 mg by mouth daily   Yes Historical Provider, MD   levothyroxine (SYNTHROID) 150 MCG tablet Take 150 mcg by mouth daily   Yes Historical Provider, MD   sertraline (ZOLOFT) 100 MG tablet Take 100 mg by mouth daily   Yes Historical Provider, MD   Multiple Vitamins-Iron (MULTIVITAMIN/IRON PO) Take by mouth daily   Yes Historical Provider, MD   Biotin 10 MG CAPS Take by mouth   Yes Historical Provider, MD   HUMALOG KWIKPEN 100 UNIT/ML SOPN inject INSULIN every morning and at bedtime PER SLIDING SCALE:151...  (REFER TO PRESCRIPTION NOTES).  4/12/21  Yes Historical Provider, MD   HUMULIN N KWIKPEN 100 UNIT/ML injection pen INJECT 14 UNITS SUBCUTANEOUSLY ONCE DAILY 4/16/21  Yes Historical Provider, MD   metoprolol tartrate (LOPRESSOR) 25 MG tablet take 1/2 tablet by mouth twice a day 6/21/21  Yes Historical Provider, MD   mycophenolate (CELLCEPT) 250 MG capsule take 4 capsules by mouth every morning and 4 capsules every evening 6/21/21  Yes Historical Provider, MD   predniSONE (DELTASONE) 10 MG tablet Take 10 mg by mouth daily   Yes Historical Provider, MD   clonazePAM (KLONOPIN) 1 MG tablet take 1 tablet by mouth three times a day if needed 7/18/19 6/30/21 Yes HALINA Juarez - CNP   sulfamethoxazole-trimethoprim (BACTRIM DS;SEPTRA DS) 800-160 MG per tablet TAKE ONE-HALF (1/2) TABLET 5 DAYS A WEEK ( [address] ) 1/17/19  Yes Elke Macdonald MD   Vitamin D, Cholecalciferol, 400 units CAPS Take by mouth   Yes Historical Provider, MD   ondansetron (ZOFRAN ODT) 4 MG disintegrating tablet Take 1 tablet by mouth every 8 hours as needed for Nausea 2/28/17  Yes Kala Nelson, DO   glucose blood VI test strips (ASCENSIA AUTODISC VI;ONE TOUCH ULTRA TEST VI) strip One touch ultra test strips. Test up to 4 times a day. 5/23/16  Yes Marcos Arana MD   PROGRAF 1 MG capsule Take 1 mg by mouth 8 times daily  6/26/14  Yes Historical Provider, MD       Allergies:  Codeine, Tylenol with codeine #3 [acetaminophen-codeine], Vicodin [hydrocodone-acetaminophen], Ace inhibitors, Aspirin, Azulfidine [sulfasalazine], Laura-d [diphenhydramine], Elmiron [pentosan polysulfate sodium], Fentanyl, Medrol [methylprednisolone], Morphine, Other, Reglan [metoclopramide], Tramadol, and Phenergan [promethazine]    Social History:   reports that she quit smoking about 45 years ago. She has a 1.60 pack-year smoking history. She has never used smokeless tobacco. She reports that she does not drink alcohol and does not use drugs. Family History: family history includes COPD in her mother; Cirrhosis in an other family member; Diabetes in her father; Heart Disease in her mother. No h/o sudden cardiac death. No for premature CAD    REVIEW OF SYSTEMS:    · Constitutional: there has been no unanticipated weight loss. There's been No change in energy level, No change in activity level. · Eyes: No visual changes or diplopia. No scleral icterus. · ENT: No Headaches, hearing loss or vertigo. No mouth sores or sore throat. · Cardiovascular: see above  · Respiratory: see above  · Gastrointestinal: No abdominal pain, appetite loss, blood in stools. · Genitourinary: No dysuria, trouble voiding, or hematuria. · Musculoskeletal:  No gait disturbance, No weakness or joint complaints. · Integumentary: No rash or pruritis. · Neurological: No headache or diplopia. No tingling  · Psychiatric: No anxiety, or depression. · Endocrine: No temperature intolerance.    · Hematologic/Lymphatic: No abnormal bruising or bleeding, blood clots or swollen lymph nodes.  · Allergic/Immunologic: No nasal congestion or hives. PHYSICAL EXAM:      BP (!) 116/58   Pulse 67   Ht 5' 9.5\" (1.765 m)   Wt 157 lb (71.2 kg)   BMI 22.85 kg/m²    Constitutional and General Appearance: alert, cooperative, no distress and appears stated age  [de-identified]: PERRL, no cervical lymphadenopathy. No masses palpable. Normal oral mucosa  Respiratory:  · Normal excursion and expansion without use of accessory muscles  · Resp Auscultation: Good respiratory effort. No for increased work of breathing. On auscultation: clear to auscultation bilaterally  Cardiovascular:  · Heart tones are crisp and normal. regular S1 and S2.  · Jugular venous pulsation Normal  · The carotid upstroke is normal in amplitude and contour without delay or bruit   Abdomen:   · soft  · Bowel sounds present  Extremities:  ·  No edema  Neurological:  · Alert and oriented. Cardiac Data:  EKG: NSR, NL ECG    Labs:     CBC: No results for input(s): WBC, HGB, HCT, PLT in the last 72 hours. BMP: No results for input(s): NA, K, CO2, BUN, CREATININE, LABGLOM, GLUCOSE in the last 72 hours. PT/INR: No results for input(s): PROTIME, INR in the last 72 hours. FASTING LIPID PANEL:  Lab Results   Component Value Date    HDL 76 11/19/2018    LDLCALC 61 03/17/2017    TRIG 91 11/19/2018     LIVER PROFILE:No results for input(s): AST, ALT, LABALBU in the last 72 hours.     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    Encounter for long-term (current) use of other medications    Essential hypertension    Hypothyroidism    Chronic renal failure, stage 3 (moderate) (HCC)    Diabetes type 2, controlled [facility]    Major depression, chronic    Encounter for chronic pain management    Herniated cervical disc    Chronic cerebral ischemia    VBI (vertebrobasilar insufficiency)    Cervical radiculopathy    Bilateral carpal tunnel syndrome    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 Grade I (mild) left ventricular diastolic dysfunction. Mild to moderate mitral regurgitation. Normal tricuspid valve leaflets. Trivial tricuspid regurgitation. Estimated right ventricular systolic pressure is 26 mmHg. No significant pericardial effusion is seen. TTE 11/20/19 at [address] [place] [address]:   - Exam indication: Nonsustained atrial fibrillation   - The left ventricle is normal in size. Left ventricular systolic function is   normal. EF = 57 ± 5% (2D biplane)   - The right ventricle is normal in size. Right ventricular systolic function is   normal.   - The left atrial cavity is mildly dilated. - There is moderate (2+) holosystolic mitral valve regurgitation. Regurgitant   orifice area (PISA) is 0.15 cm². - Exam was compared with the prior  echocardiographic exam performed on 8/23/19.    MR is increased on today's study. Stress echo 7/1/19- Negative for ischemia. Moderate MR. Assessment and plan:    - Chest pressure. Cor risk factors, DM on insulin. Obtain lexiscan stress test.  -H/o atrial fibrillation with ablation 12/10/2014.  -Moderate MR- Obtain TTE.  -DM- on insulin for 12 year.  -Hypothyroidism on replacement. -GERD- On omeprazole and famotidine  -Depression- on zoloft. Management by PCP  -CKD in transplanted kidney- biopsy scheduled 7/1/21  -S/p liver and kidney transplant in 2019.  This was her second liver transplant. First transplant lasted 15 years. Underlying diagnosis is primary sclerosing cholangitis. On prograf and Cellcept  -Tremors from transplant medications. -RTC 6 months.     Ramandeep Rosenberg, [facility]  [phone]

## 2021-07-19 ENCOUNTER — HOSPITAL ENCOUNTER (OUTPATIENT)
Dept: NUCLEAR MEDICINE | Age: 66
Discharge: HOME | End: 2021-07-21
Payer: COMMERCIAL

## 2021-07-19 ENCOUNTER — HOSPITAL ENCOUNTER (OUTPATIENT)
Dept: NON INVASIVE DIAGNOSTICS | Age: 66
Discharge: HOME | End: 2021-07-19
Payer: COMMERCIAL

## 2021-07-19 DIAGNOSIS — R07.9 CHEST PAIN, UNSPECIFIED TYPE: ICD-10-CM

## 2021-07-19 DIAGNOSIS — I48.91 ATRIAL FIBRILLATION, UNSPECIFIED TYPE (HCC): ICD-10-CM

## 2021-07-19 DIAGNOSIS — I34.0 MITRAL VALVE INSUFFICIENCY, UNSPECIFIED ETIOLOGY: ICD-10-CM

## 2021-07-19 LAB
LV EF: 60 %
LVEF MODALITY: NORMAL

## 2021-07-19 PROCEDURE — 93017 CV STRESS TEST TRACING ONLY: CPT

## 2021-07-19 PROCEDURE — 6360000002 HC RX W HCPCS: Performed by: INTERNAL MEDICINE

## 2021-07-19 PROCEDURE — 78452 HT MUSCLE IMAGE SPECT MULT: CPT

## 2021-07-19 PROCEDURE — 3430000000 HC RX DIAGNOSTIC RADIOPHARMACEUTICAL: Performed by: INTERNAL MEDICINE

## 2021-07-19 PROCEDURE — A9500 TC99M SESTAMIBI: HCPCS | Performed by: INTERNAL MEDICINE

## 2021-07-19 PROCEDURE — 93306 TTE W/DOPPLER COMPLETE: CPT

## 2021-07-19 RX ADMIN — TETRAKIS(2-METHOXYISOBUTYLISOCYANIDE)COPPER(I) TETRAFLUOROBORATE 30 MILLICURIE: 1 INJECTION, POWDER, LYOPHILIZED, FOR SOLUTION INTRAVENOUS at 10:37

## 2021-07-19 RX ADMIN — REGADENOSON 0.4 MG: 0.08 INJECTION, SOLUTION INTRAVENOUS at 09:27

## 2021-07-19 RX ADMIN — TETRAKIS(2-METHOXYISOBUTYLISOCYANIDE)COPPER(I) TETRAFLUOROBORATE 10 MILLICURIE: 1 INJECTION, POWDER, LYOPHILIZED, FOR SOLUTION INTRAVENOUS at 10:37

## 2021-07-19 NOTE — PROCEDURES
José Miguel [address]                 [address] [place] [id number]                              CARDIAC STRESS TEST    PATIENT NAME: Raman Munoz                      :        1955  MED REC NO:   [id number]                             ROOM:  ACCOUNT NO:   [de-identified]                           ADMIT DATE: 2021  PROVIDER:     Mehnaz Arenas    DATE OF STUDY:  2021    STRESS TEST    Ordering Provider: Paul Dior MD    Primary Care Provider: Amanda Unger MD    Patient's Age: 63     Height: 5 feet, 9 inches  Weight: 157 pounds    INDICATION:  Chest pain. Lexiscan 0.4 mg injected over 10 seconds. IV Cardiolite injected 20 seconds post Lexiscan injection. Heart Rate: 58 Resting blood pressure:  151/68              HR   BP  1 min          84   123/65  2 min  3 min          83   124/61  4 min  5 min          80   122/60  6 min  7 min          76   121/61  8 min  9 min          75   120/60  10 min    Symptoms:  Chest Pain: No.  Nausea: No.  Headache:  No.  Shortness of breath: No.  Other: Yes. Pressure in shoulders. Resting EKG:  Sinus bradycardia, otherwise normal ECG. Arrhythmias: None. EKG changes:  No significant change from baseline after Lexiscan  injection to suggest ischemia. INTERPRETATION:  1. Negative ECG portion of stress test for ischemia. 2. Nuclear scan report to follow. Nuclear Myocardial Perfusion Imaging (SPECT)    TESTING METHOD  STRESS:   Lexiscan  AGENT:    Cardiolite  REST:          Injection Date:  21  Time:  810  Amount:  10.42 mCi  STRESS:   Injection Date:  21  Time:  925  Amount:  31.6 mCi  IMAGE TIME:    Rest:  853  Stress:  1030    EF:  61%  TID:  0.90  LHR:  0.26    FINDINGS:  Ischemia (Reversible Defect):           No.  Infarction (Irreversible Defect):       No.  Ejection fraction Calculated:           Yes, 61%. Segmental wall motion:                  Yes.     Low risk

## 2021-07-19 NOTE — PROGRESS NOTES
Patient Name:  Brain Galicia MRN:  [id number]   :  1955  Age:  63 y.o. Sex: female   Ordering Provider: Rebekah Gan MD  Referring Provider: Estefany Paige MD  Primary Care Provider:  Estefany Paige MD     Indications: Chest Pain     Medications:     Current Outpatient Medications:     pantoprazole (PROTONIX) 40 MG tablet, Take 40 mg by mouth daily, Disp: , Rfl:     levothyroxine (SYNTHROID) 150 MCG tablet, Take 150 mcg by mouth daily, Disp: , Rfl:     sertraline (ZOLOFT) 100 MG tablet, Take 100 mg by mouth daily, Disp: , Rfl:     Multiple Vitamins-Iron (MULTIVITAMIN/IRON PO), Take by mouth daily, Disp: , Rfl:     Biotin 10 MG CAPS, Take by mouth, Disp: , Rfl:     HUMALOG KWIKPEN 100 UNIT/ML SOPN, inject INSULIN every morning and at bedtime PER SLIDING SCALE:151...  (REFER TO PRESCRIPTION NOTES). , Disp: , Rfl:     HUMULIN N KWIKPEN 100 UNIT/ML injection pen, INJECT 14 UNITS SUBCUTANEOUSLY ONCE DAILY, Disp: , Rfl:     metoprolol tartrate (LOPRESSOR) 25 MG tablet, take 1/2 tablet by mouth twice a day, Disp: , Rfl:     mycophenolate (CELLCEPT) 250 MG capsule, take 4 capsules by mouth every morning and 4 capsules every evening, Disp: , Rfl:     predniSONE (DELTASONE) 10 MG tablet, Take 10 mg by mouth daily, Disp: , Rfl:     clonazePAM (KLONOPIN) 1 MG tablet, take 1 tablet by mouth three times a day if needed, Disp: 90 tablet, Rfl: 0    sulfamethoxazole-trimethoprim (BACTRIM DS;SEPTRA DS) 800-160 MG per tablet, TAKE ONE-HALF (1/2) TABLET 5 DAYS A WEEK ( [address] ), Disp: 30 tablet, Rfl: 3    Vitamin D, Cholecalciferol, 400 units CAPS, Take by mouth, Disp: , Rfl:     ondansetron (ZOFRAN ODT) 4 MG disintegrating tablet, Take 1 tablet by mouth every 8 hours as needed for Nausea, Disp: 20 tablet, Rfl: 0    glucose blood VI test strips (ASCENSIA AUTODISC VI;ONE TOUCH ULTRA TEST VI) strip, One touch ultra test strips.  Test up to 4 times a day., Disp: 100 each, Rfl: 11    PROGRAF 1 MG capsule, Take 1 mg by mouth 8 times daily , Disp: , Rfl:     Current Facility-Administered Medications:     regadenoson (LEXISCAN) injection 0.4 mg, 0.4 mg, Intravenous, Once, Vanesa Spencer MD    Facility-Administered Medications Ordered in Other Encounters:     technetium sestamibi (CARDIOLITE) injection 30 millicurie, 30 millicurie, Intravenous, ONCE PRNShanthi MD    technetium sestamibi (CARDIOLITE) injection 10 millicurie, 10 millicurie, Intravenous, ONCE PRN, Shanthi Allen MD      ----------------------------------------------------------------------------------------------------------                Tesfaye Mercado 0.4 mg injected over 10 seconds. IV Cardiolite injected 20 seconds post Lexiscan injection. Heart Rate:  58  Resting Blood Pressure:  151/68   ----------------------------------------------------------------------------------------------------------     HR BP   1 min 84 123/65   2 min     3 min 83 124/61   4 min     5 min 80 122/60   6 min     7 min 76 121/61   8 min     9 min 75 120/60   10 min       Symptoms:  Chest Pain:  No      Nausea:  No     Headache:  No    Shortness of Breath:  No     Other:  Yes  Pressure in shoulders. Electronically signed by Murtaza Hudson RN on 7/19/21 at 9:16 AM EDT  ----------------------------------------------------------------------------------------------------------  Resting EKG: Sinus bradycardia, otherwise normal ECG    Arrhythmias: None    EKG Changes: No significant change from baseline after Lexiscan injection to suggest ischemia    Interpretation:    1. Negative ECG portion of stress test for ischemia  2.   Nuclear scan report to follow      Supervising Physician:  Flor Manjarrez MD [facility]

## 2021-07-20 ENCOUNTER — TELEPHONE (OUTPATIENT)
Dept: CARDIOLOGY | Age: 66
End: 2021-07-20

## 2021-09-22 ENCOUNTER — HOSPITAL ENCOUNTER (EMERGENCY)
Age: 66
Discharge: HOME | End: 2021-09-22
Attending: EMERGENCY MEDICINE
Payer: COMMERCIAL

## 2021-09-22 ENCOUNTER — APPOINTMENT (OUTPATIENT)
Dept: CT IMAGING | Age: 66
End: 2021-09-22
Payer: COMMERCIAL

## 2021-09-22 VITALS
BODY MASS INDEX: 23.4 KG/M2 | OXYGEN SATURATION: 100 % | SYSTOLIC BLOOD PRESSURE: 132 MMHG | TEMPERATURE: 99.3 F | DIASTOLIC BLOOD PRESSURE: 71 MMHG | HEART RATE: 65 BPM | HEIGHT: 69 IN | RESPIRATION RATE: 16 BRPM | WEIGHT: 158 LBS

## 2021-09-22 DIAGNOSIS — E86.0 DEHYDRATION: ICD-10-CM

## 2021-09-22 DIAGNOSIS — R10.30 LOWER ABDOMINAL PAIN: Primary | ICD-10-CM

## 2021-09-22 DIAGNOSIS — R19.7 DIARRHEA, UNSPECIFIED TYPE: ICD-10-CM

## 2021-09-22 LAB
-: ABNORMAL
ABSOLUTE EOS #: 0 K/UL (ref 0–0.44)
ABSOLUTE IMMATURE GRANULOCYTE: 0.07 K/UL (ref 0–0.3)
ABSOLUTE LYMPH #: 0.48 K/UL (ref 1.1–3.7)
ABSOLUTE MONO #: 0.35 K/UL (ref 0.1–1.2)
ALBUMIN SERPL-MCNC: 4.4 G/DL (ref 3.5–5.2)
ALBUMIN/GLOBULIN RATIO: 1.7 (ref 1–2.5)
ALP BLD-CCNC: 75 U/L (ref 35–104)
ALT SERPL-CCNC: 14 U/L (ref 5–33)
AMORPHOUS: ABNORMAL
AMYLASE: 89 U/L (ref 28–100)
ANION GAP SERPL CALCULATED.3IONS-SCNC: 12 MMOL/L (ref 9–17)
AST SERPL-CCNC: 16 U/L
BACTERIA: ABNORMAL
BASOPHILS # BLD: 0 % (ref 0–2)
BASOPHILS ABSOLUTE: 0 K/UL (ref 0–0.2)
BILIRUB SERPL-MCNC: 0.22 MG/DL (ref 0.3–1.2)
BILIRUBIN URINE: NEGATIVE
BUN BLDV-MCNC: 31 MG/DL (ref 8–23)
BUN/CREAT BLD: 14 (ref 9–20)
CALCIUM SERPL-MCNC: 9.2 MG/DL (ref 8.6–10.4)
CASTS UA: ABNORMAL /LPF (ref 0–2)
CASTS UA: ABNORMAL /LPF (ref 0–2)
CHLORIDE BLD-SCNC: 102 MMOL/L (ref 98–107)
CO2: 18 MMOL/L (ref 20–31)
COLOR: NORMAL
COMMENT UA: NORMAL
CREAT SERPL-MCNC: 2.29 MG/DL (ref 0.5–0.9)
CRYSTALS, UA: ABNORMAL /HPF
DIFFERENTIAL TYPE: ABNORMAL
EOSINOPHILS RELATIVE PERCENT: 0 % (ref 1–4)
EPITHELIAL CELLS UA: ABNORMAL /HPF (ref 0–5)
GFR AFRICAN AMERICAN: 26 ML/MIN
GFR NON-AFRICAN AMERICAN: 22 ML/MIN
GFR SERPL CREATININE-BSD FRML MDRD: ABNORMAL ML/MIN/{1.73_M2}
GFR SERPL CREATININE-BSD FRML MDRD: ABNORMAL ML/MIN/{1.73_M2}
GLUCOSE BLD-MCNC: 199 MG/DL (ref 70–99)
GLUCOSE URINE: NEGATIVE
HCT VFR BLD CALC: 33.6 % (ref 36.3–47.1)
HEMOGLOBIN: 10.5 G/DL (ref 11.9–15.1)
IMMATURE GRANULOCYTES: 1 %
KETONES, URINE: NEGATIVE
LACTIC ACID, SEPSIS WHOLE BLOOD: NORMAL MMOL/L (ref 0.5–1.9)
LACTIC ACID, SEPSIS: 1.2 MMOL/L (ref 0.5–1.9)
LEUKOCYTE ESTERASE, URINE: NEGATIVE
LIPASE: 86 U/L (ref 13–60)
LYMPHOCYTES # BLD: 7 % (ref 24–43)
MCH RBC QN AUTO: 26.4 PG (ref 25.2–33.5)
MCHC RBC AUTO-ENTMCNC: 31.3 G/DL (ref 25.2–33.5)
MCV RBC AUTO: 84.4 FL (ref 82.6–102.9)
MONOCYTES # BLD: 5 % (ref 3–12)
MORPHOLOGY: ABNORMAL
MORPHOLOGY: ABNORMAL
MUCUS: ABNORMAL
NITRITE, URINE: NEGATIVE
NRBC AUTOMATED: 0 PER 100 WBC
OTHER OBSERVATIONS UA: ABNORMAL
PDW BLD-RTO: 14.9 % (ref 11.8–14.4)
PH UA: 6 (ref 5–6)
PLATELET # BLD: 180 K/UL (ref 138–453)
PLATELET ESTIMATE: ABNORMAL
PMV BLD AUTO: 8.9 FL (ref 8.1–13.5)
POTASSIUM SERPL-SCNC: 4.6 MMOL/L (ref 3.7–5.3)
PROTEIN UA: NEGATIVE
RBC # BLD: 3.98 M/UL (ref 3.95–5.11)
RBC # BLD: ABNORMAL 10*6/UL
RBC UA: ABNORMAL /HPF (ref 0–4)
RENAL EPITHELIAL, UA: ABNORMAL /HPF
SEG NEUTROPHILS: 87 % (ref 36–65)
SEGMENTED NEUTROPHILS ABSOLUTE COUNT: 6 K/UL (ref 1.5–8.1)
SODIUM BLD-SCNC: 132 MMOL/L (ref 135–144)
SPECIFIC GRAVITY UA: 1.02 (ref 1.01–1.02)
TOTAL PROTEIN: 7 G/DL (ref 6.4–8.3)
TRICHOMONAS: ABNORMAL
TURBIDITY: NORMAL
URINE HGB: NEGATIVE
UROBILINOGEN, URINE: NORMAL
WBC # BLD: 6.9 K/UL (ref 3.5–11.3)
WBC # BLD: ABNORMAL 10*3/UL
WBC UA: ABNORMAL /HPF (ref 0–4)
YEAST: ABNORMAL

## 2021-09-22 PROCEDURE — 96361 HYDRATE IV INFUSION ADD-ON: CPT

## 2021-09-22 PROCEDURE — 83605 ASSAY OF LACTIC ACID: CPT

## 2021-09-22 PROCEDURE — 2580000003 HC RX 258: Performed by: EMERGENCY MEDICINE

## 2021-09-22 PROCEDURE — 81001 URINALYSIS AUTO W/SCOPE: CPT

## 2021-09-22 PROCEDURE — 87086 URINE CULTURE/COLONY COUNT: CPT

## 2021-09-22 PROCEDURE — 99285 EMERGENCY DEPT VISIT HI MDM: CPT

## 2021-09-22 PROCEDURE — 82150 ASSAY OF AMYLASE: CPT

## 2021-09-22 PROCEDURE — 83690 ASSAY OF LIPASE: CPT

## 2021-09-22 PROCEDURE — 96360 HYDRATION IV INFUSION INIT: CPT

## 2021-09-22 PROCEDURE — 74176 CT ABD & PELVIS W/O CONTRAST: CPT

## 2021-09-22 PROCEDURE — 80053 COMPREHEN METABOLIC PANEL: CPT

## 2021-09-22 PROCEDURE — 85025 COMPLETE CBC W/AUTO DIFF WBC: CPT

## 2021-09-22 RX ORDER — 0.9 % SODIUM CHLORIDE 0.9 %
500 INTRAVENOUS SOLUTION INTRAVENOUS ONCE
Status: COMPLETED | OUTPATIENT
Start: 2021-09-22 | End: 2021-09-22

## 2021-09-22 RX ORDER — 0.9 % SODIUM CHLORIDE 0.9 %
1000 INTRAVENOUS SOLUTION INTRAVENOUS ONCE
Status: COMPLETED | OUTPATIENT
Start: 2021-09-22 | End: 2021-09-22

## 2021-09-22 RX ORDER — ONDANSETRON 4 MG/1
4 TABLET, FILM COATED ORAL EVERY 8 HOURS PRN
Qty: 20 TABLET | Refills: 0 | Status: SHIPPED | OUTPATIENT
Start: 2021-09-22 | End: 2022-08-10 | Stop reason: SDUPTHER

## 2021-09-22 RX ORDER — 0.9 % SODIUM CHLORIDE 0.9 %
1000 INTRAVENOUS SOLUTION INTRAVENOUS ONCE
Status: DISCONTINUED | OUTPATIENT
Start: 2021-09-22 | End: 2021-09-22

## 2021-09-22 RX ADMIN — SODIUM CHLORIDE 1000 ML: 9 INJECTION, SOLUTION INTRAVENOUS at 18:56

## 2021-09-22 RX ADMIN — SODIUM CHLORIDE 500 ML: 9 INJECTION, SOLUTION INTRAVENOUS at 20:09

## 2021-09-22 ASSESSMENT — PAIN DESCRIPTION - DESCRIPTORS: DESCRIPTORS: ACHING;CRAMPING

## 2021-09-22 ASSESSMENT — PAIN SCALES - GENERAL: PAINLEVEL_OUTOF10: 8

## 2021-09-22 ASSESSMENT — PAIN DESCRIPTION - LOCATION: LOCATION: ABDOMEN

## 2021-09-22 NOTE — ED PROVIDER NOTES
[address]      Pt Name: Raj Meza  MRN: [id number]  [place] 1955  Date of evaluation: 9/22/2021      CHIEF COMPLAINT       Chief Complaint   Patient presents with    Abdominal Pain    Diarrhea         HISTORY OF PRESENT ILLNESS      The patient presents with diarrhea and abdominal discomfort. She has had diarrhea for about a week. 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 The patient had a negative Covid swab swab from the home Covid test today. She called her doctor at [facility] and they admonished her to go to the hospital to make sure she was getting dehydrated. She says she has been able to keep up with fluids by drinking them. She denies blood in her stool or emesis. She denies cough or sore throat. She was vaccinated against COVID-19. She denies chest pain or shortness of breath. Nothing makes her symptoms better or worse otherwise. REVIEW OF SYSTEMS       All systems reviewed and negative unless noted in HPI. The patient denies fever or constitutional symptoms. Denies vision change. Denies any sore throat or rhinorrhea. Denies any neck pain or stiffness. Denies chest pain or shortness of breath. Mild nausea. Mild abdominal pain. Diarrhea without blood that has been [employer] water\". Denies any dysuria. Denies urinary frequency or hematuria. Denies musculoskeletal injury or pain. Denies any weakness, numbness or focal neurologic deficit. Denies any skin rash or edema. No recent psychiatric issues. No easy bruising or bleeding. History of kidney and liver transplant.       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 SURGICAL HISTORY      has a past surgical history that includes Colon surgery; Breast surgery; liver biopsy; Liver transplant (2004); Abdominal hernia repair (2005); ventral hernia repair (2008); hernia repair (2014, revision );  section; Colonoscopy; Upper gastrointestinal endoscopy; Cholecystectomy; Appendectomy; Atrial ablation surgery (, 12/10/14); eye surgery; Breast biopsy (Left, 2014); retinal laser (Bilateral); Cataract removal with implant (Right, 2017); Cataract removal with implant (Left, 2017); pr xcapsl ctrc rmvl insj io lens prosth w/o ecp (Right, 2017); pr xcapsl ctrc rmvl insj io lens prosth w/o ecp (Left, 2017); and Kidney transplant. CURRENT MEDICATIONS       Previous Medications    BIOTIN 10 MG CAPS    Take by mouth    CLONAZEPAM (KLONOPIN) 1 MG TABLET    take 1 tablet by mouth three times a day if needed    GLUCOSE BLOOD VI TEST STRIPS (ASCENSIA AUTODISC VI;ONE TOUCH ULTRA TEST VI) STRIP    One touch ultra test strips. Test up to 4 times a day. HUMALOG KWIKPEN 100 UNIT/ML SOPN    inject INSULIN every morning and at bedtime PER SLIDING SCALE:151...  (REFER TO PRESCRIPTION NOTES).     HUMULIN N KWIKPEN 100 UNIT/ML INJECTION PEN    INJECT 14 UNITS SUBCUTANEOUSLY ONCE DAILY    LEVOTHYROXINE (SYNTHROID) 150 MCG TABLET    Take 150 mcg by mouth daily    METOPROLOL TARTRATE (LOPRESSOR) 25 MG TABLET    take 1/2 tablet by mouth twice a day    MULTIPLE VITAMINS-IRON (MULTIVITAMIN/IRON PO)    Take by mouth daily    MYCOPHENOLATE (CELLCEPT) 250 MG CAPSULE    take 4 capsules by mouth every morning and 4 capsules every evening    ONDANSETRON (ZOFRAN ODT) 4 MG DISINTEGRATING TABLET    Take 1 tablet by mouth every 8 hours as needed for Nausea    PANTOPRAZOLE (PROTONIX) 40 MG TABLET    Take 40 mg by mouth daily    PREDNISONE (DELTASONE) 10 MG TABLET    Take 10 mg by mouth daily    PROGRAF 1 MG CAPSULE    Take 1 mg by mouth 8 times daily     SERTRALINE (ZOLOFT) 100 MG TABLET    Take 100 mg by mouth daily    SULFAMETHOXAZOLE-TRIMETHOPRIM (BACTRIM DS;SEPTRA DS) 800-160 MG PER TABLET    TAKE ONE-HALF (1/2) TABLET 5 DAYS A WEEK ( MONDAY THROUGH FRIDAY )    VITAMIN D, CHOLECALCIFEROL, 400 UNITS CAPS    Take by mouth       ALLERGIES     is allergic to codeine, tylenol with codeine #3 [acetaminophen-codeine], vicodin [hydrocodone-acetaminophen], ace inhibitors, aspirin, azulfidine [sulfasalazine], roberta-d [diphenhydramine], elmiron [pentosan polysulfate sodium], fentanyl, medrol [methylprednisolone], morphine, other, reglan [metoclopramide], tramadol, and phenergan [promethazine]. FAMILY HISTORY     She indicated that her mother is . She indicated that her father is . She indicated that the status of her other is unknown. She indicated that the status of her neg hx is unknown.     family history includes COPD in her mother; Cirrhosis in an other family member; Diabetes in her father; Heart Disease in her mother. SOCIAL HISTORY      reports that she quit smoking about 45 years ago. She has a 1.60 pack-year smoking history. She has never used smokeless tobacco. She reports that she does not drink alcohol and does not use drugs. PHYSICAL EXAM     INITIAL VITALS:  height is 5' 9\" (1.753 m) and weight is 158 lb (71.7 kg). Her tympanic temperature is 99.3 °F (37.4 °C). Her blood pressure is 123/69 and her pulse is 76.  Her Absolute Eos # PENDING k/uL    Basophils Absolute PENDING 0.0 - 0.2 k/uL    Absolute Immature Granulocyte PENDING 0.00 - 0.30 k/uL    WBC Morphology NOT REPORTED     RBC Morphology NOT REPORTED     Platelet Estimate NOT REPORTED          EMERGENCY DEPARTMENT COURSE:   Vitals:    Vitals:    09/22/21 1835   BP: 123/69   Pulse: 76   Resp: 18   Temp: 99.3 °F (37.4 °C)   TempSrc: Tympanic   SpO2: 99%   Weight: 158 lb (71.7 kg)   Height: 5' 9\" (1.753 m)     -------------------------  BP: 123/69, Temp: 99.3 °F (37.4 °C), Pulse: 76, Resp: 18      Re-evaluation Notes      The patient will be endorsed to Dr. Eduar eKndrick secondary to end of shift. Please refer to her documentation. FINAL IMPRESSION    No diagnosis found. DISPOSITION/PLAN   DISPOSITION        Condition on Disposition    stable    PATIENT REFERRED TO:  No follow-up provider specified.     DISCHARGE MEDICATIONS:  New Prescriptions    No medications on file       (Please note that portions of this note were completed with a voice recognition program.  Efforts were made to edit the dictations but occasionally words are mis-transcribed.)    Jael Ferrara MD,, MD   Attending Emergency Physician         Yun Ochoa MD  09/22/21 0088

## 2021-09-23 ENCOUNTER — HOSPITAL ENCOUNTER (OUTPATIENT)
Age: 66
Setting detail: SPECIMEN
Discharge: HOME | End: 2021-09-23
Payer: COMMERCIAL

## 2021-09-23 DIAGNOSIS — R19.7 DIARRHEA, UNSPECIFIED TYPE: ICD-10-CM

## 2021-09-23 DIAGNOSIS — R10.30 LOWER ABDOMINAL PAIN: ICD-10-CM

## 2021-09-23 DIAGNOSIS — E86.0 DEHYDRATION: ICD-10-CM

## 2021-09-23 LAB
C DIFF AG + TOXIN: NEGATIVE
SPECIMEN DESCRIPTION: NORMAL

## 2021-09-23 PROCEDURE — 87209 SMEAR COMPLEX STAIN: CPT

## 2021-09-23 PROCEDURE — 87328 CRYPTOSPORIDIUM AG IA: CPT

## 2021-09-23 PROCEDURE — 87329 GIARDIA AG IA: CPT

## 2021-09-23 PROCEDURE — 87506 IADNA-DNA/RNA PROBE TQ 6-11: CPT

## 2021-09-23 PROCEDURE — 87210 SMEAR WET MOUNT SALINE/INK: CPT

## 2021-09-23 PROCEDURE — 87449 NOS EACH ORGANISM AG IA: CPT

## 2021-09-23 PROCEDURE — 87015 SPECIMEN INFECT AGNT CONCNTJ: CPT

## 2021-09-23 PROCEDURE — 87324 CLOSTRIDIUM AG IA: CPT

## 2021-09-23 NOTE — ED PROVIDER NOTES
ADDENDUM:      Care of this patient was assumed from Dr. Nnamdi Alberto. The patient was seen for Abdominal Pain and Diarrhea  . The patient's initial evaluation and plan have been discussed with the prior provider who initially evaluated the patient. Nursing Notes, Past Medical Hx, Past Surgical Hx, Social Hx, Family Hx, Medications and Allergies, and  were all reviewed. Diagnostic Results     EKG     None    RADIOLOGY:    CT ABDOMEN PELVIS WO CONTRAST Additional Contrast? None    Result Date: 9/22/2021  EXAMINATION: CT OF THE ABDOMEN AND PELVIS WITHOUT CONTRAST 9/22/2021 4:05 pm TECHNIQUE: CT of the abdomen and pelvis was performed without the administration of intravenous contrast. Multiplanar reformatted images are provided for review. Dose modulation, iterative reconstruction, and/or weight based adjustment of the mA/kV was utilized to reduce the radiation dose to as low as reasonably achievable. COMPARISON: 11/23/2016 HISTORY: ORDERING SYSTEM PROVIDED HISTORY: diarrhea TECHNOLOGIST PROVIDED HISTORY: diarrhea Decision Support Exception - unselect if not a suspected or confirmed emergency medical condition->Emergency Medical Condition (MA) Reason for Exam: Lower abd pain and diarrhea for 1.5 weeks, hx 2 liver transplants most recent in 2019, kidney transplant in 2019 Acuity: Acute Type of Exam: Initial FINDINGS: Lower Chest: 5 mm nodule within the anterior lingula is unchanged since 2016 and considered benign. A requires no additional follow-up. 1 lungs are otherwise clear. Noncontrast imaging of the base of the heart is unremarkable. Lack of intravenous contrast limits evaluation of the solid abdominal viscera, the hollow abdominal viscera, and the vascular structures. Organs: Having said that, no acute or suspicious hepatic abnormality identified. Borderline splenomegaly is noted, similar to mildly increased when compared to the previous exam.  Spleen is otherwise unremarkable.   No acute or suspicious adrenal abnormality. Pancreas is unremarkable in appearance. Patient's native kidneys are atrophic. A right pelvic transplant kidney is noted. GI/Bowel: Patient appears to be status post colectomy, with anastomotic staple line seen near the anorectal verge. No focal mural thickening is identified within the residual bowel. There is no evidence of obstruction. Overall, no acute bowel abnormalities detected. Pelvis: Uterus and adnexa regions are unremarkable. Urinary bladder unremarkable. No free pelvic fluid. Peritoneum/Retroperitoneum: Abdominal aorta normal in caliber. No retroperitoneal lymphadenopathy is seen. Bones/Soft Tissues: No acute or suspicious bony abnormality identified. Atrophy of the abdominal wall soft tissues is seen on the right, with mild laxity. No acute abnormality identified.        LABS:   Results for orders placed or performed during the hospital encounter of 09/22/21   Lactate, Sepsis   Result Value Ref Range    Lactic Acid, Sepsis 1.2 0.5 - 1.9 mmol/L    Lactic Acid, Sepsis, Whole Blood NOT REPORTED 0.5 - 1.9 mmol/L   CBC Auto Differential   Result Value Ref Range    WBC 6.9 3.5 - 11.3 k/uL    RBC 3.98 3.95 - 5.11 m/uL    Hemoglobin 10.5 (L) 11.9 - 15.1 g/dL    Hematocrit 33.6 (L) 36.3 - 47.1 %    MCV 84.4 82.6 - 102.9 fL    MCH 26.4 25.2 - 33.5 pg    MCHC 31.3 25.2 - 33.5 g/dL    RDW 14.9 (H) 11.8 - 14.4 %    Platelets 756 820 - 518 k/uL    MPV 8.9 8.1 - 13.5 fL    NRBC Automated 0.0 0.0 per 100 WBC    Differential Type NOT REPORTED     WBC Morphology NOT REPORTED     RBC Morphology NOT REPORTED     Platelet Estimate NOT REPORTED     Monocytes 5 3 - 12 %    Lymphocytes 7 (L) 24 - 43 %    Seg Neutrophils 87 (H) 36 - 65 %    Eosinophils % 0 (L) 1 - 4 %    Basophils 0 0 - 2 %    Immature Granulocytes 1 (H) 0 %    Absolute Mono # 0.35 0.10 - 1.20 k/uL    Absolute Lymph # 0.48 (L) 1.10 - 3.70 k/uL    Segs Absolute 6.00 1.50 - 8.10 k/uL    Absolute Eos # 0.00 0.00 - 0.44 k/uL    Basophils Absolute 0.00 0.00 - 0.20 k/uL    Absolute Immature Granulocyte 0.07 0.00 - 0.30 k/uL    Morphology ANISOCYTOSIS PRESENT     Morphology Platelet count adequate    Comprehensive Metabolic Panel   Result Value Ref Range    Glucose 199 (H) 70 - 99 mg/dL    BUN 31 (H) 8 - 23 mg/dL    CREATININE 2.29 (H) 0.50 - 0.90 mg/dL    Bun/Cre Ratio 14 9 - 20    Calcium 9.2 8.6 - 10.4 mg/dL    Sodium 132 (L) 135 - 144 mmol/L    Potassium 4.6 3.7 - 5.3 mmol/L    Chloride 102 98 - 107 mmol/L    CO2 18 (L) 20 - 31 mmol/L    Anion Gap 12 9 - 17 mmol/L    Alkaline Phosphatase 75 35 - 104 U/L    ALT 14 5 - 33 U/L    AST 16 <32 U/L    Total Bilirubin 0.22 (L) 0.3 - 1.2 mg/dL    Total Protein 7.0 6.4 - 8.3 g/dL    Albumin 4.4 3.5 - 5.2 g/dL    Albumin/Globulin Ratio 1.7 1.0 - 2.5    GFR Non-African American 22 (L) >60 mL/min    GFR  26 (L) >60 mL/min    GFR Comment          GFR Staging NOT REPORTED    Lipase   Result Value Ref Range    Lipase 86 (H) 13 - 60 U/L   Amylase   Result Value Ref Range    Amylase 89 28 - 100 U/L   Urinalysis Reflex to Culture    Specimen: Urine, clean catch   Result Value Ref Range    Color, UA NOT REPORTED YELLOW    Turbidity UA NOT REPORTED CLEAR    Glucose, Ur NEGATIVE NEGATIVE    Bilirubin Urine NEGATIVE NEGATIVE    Ketones, Urine NEGATIVE NEGATIVE    Specific Gravity, UA 1.020 1.010 - 1.025    Urine Hgb NEGATIVE NEGATIVE    pH, UA 6.0 5.0 - 6.0    Protein, UA NEGATIVE NEGATIVE    Urobilinogen, Urine Normal Normal    Nitrite, Urine NEGATIVE NEGATIVE    Leukocyte Esterase, Urine NEGATIVE NEGATIVE    Urinalysis Comments NOT REPORTED    Microscopic Urinalysis   Result Value Ref Range    -          WBC, UA 0 TO 4 0 - 4 /HPF    RBC, UA 0 TO 4 0 - 4 /HPF    Casts UA 5 TO 10 0 - 2 /LPF    Casts UA FINE GRANULAR 0 - 2 /LPF    Crystals, UA NOT REPORTED None /HPF    Epithelial Cells UA 5 TO 10 0 - 5 /HPF    Renal Epithelial, UA NOT REPORTED 0 /HPF    Bacteria, UA 1+ (A) None    Mucus, UA 1+ (A) None Trichomonas, UA NOT REPORTED None    Amorphous, UA 1+ (A) None    Other Observations UA NOT REPORTED NOT REQ. Yeast, UA NOT REPORTED None       RECENT VITALS:  BP: 132/71, Temp: 99.3 °F (37.4 °C), Pulse: 65, Resp: 16     ED Course     The patient was given the following medications:  Orders Placed This Encounter   Medications    0.9 % sodium chloride bolus    DISCONTD: 0.9 % sodium chloride bolus    0.9 % sodium chloride bolus    ondansetron (ZOFRAN) 4 MG tablet     Sig: Take 1 tablet by mouth every 8 hours as needed for Nausea     Dispense:  20 tablet     Refill:  0     Medical Decision Making      Case signed out to me by Dr. Shaheen Swartz. Patient is referred in from her transplant team for roughly 10 days of diarrhea and some lower abdominal pain and cramping. CT results were pending. Patient has a history of a sending cholangitis is subsequently had liver and multiple kidney transplants. Last kidney transplant was around 2018 or 19. Patient does have a mildly elevated troponin at 2.29. I was able to find some labs in February of this year her creatinine was 2.0 from an outside lab. She is not sure if she has had a more recent creatinine result since that time. Patient was given a total of 2 L of fluid for hydration. Repeat examination she has mild lower abdominal tenderness but without peritoneal signs. Patient has had nearly a complete colectomy remotely as well. I will give her an updated prescription for Zofran if she only has a few tablets left at home. She is trying to hydrate at home. CT scan did not show any acute abdominal or pelvic pathology. Urinalysis with only 1+ bacteria will send for formal culture but will not treat with antibiotics at this time. Patient is to follow-up with her primary care physician within 1 to 2 days. I have discussed with her and recommended she discuss with her doctor to have a repeat creatinine drawn given her history of kidney transplant.   She was educated on the warning signs which return to the emergency department and at this point there is no objective evidence in the history physical or work-up to require further acute intervention or hospitalization, and I feel that she is appropriate for outpatient management. It was emphasized to the patient that if she is having worsening symptoms that she should return to the emergency department or if she has any other acute concerns. I have also given orders for stool studies as a stool sample was not obtained in the emergency department. 7:05 AM EDT: Case was discussed with the on-call nurse practitioner for the patient's PCP Megan Weldon NP to inform of the stool studies that were ordered in case the patient drops his off at an outside lab the results may not be received by our facility. She agreed to follow-up these results. I also updated her on the patient's work-up in the emergency department and mildly elevated creatinine. Disposition     FINAL IMPRESSION      1. Lower abdominal pain    2. Diarrhea, unspecified type    3.  Dehydration          DISPOSITION/PLAN   DISPOSITION Decision To Discharge 09/22/2021 09:13:51 PM      PATIENT REFERRED TO:  Ana Lilia Baker MD  [facility] [phone]    In 2 days        DISCHARGE MEDICATIONS:  Discharge Medication List as of 9/22/2021  9:15 PM      START taking these medications    Details   ondansetron (ZOFRAN) 4 MG tablet Take 1 tablet by mouth every 8 hours as needed for Nausea, Disp-20 tablet, R-0Normal                   (Please note that portions of this note were completed with a voice recognition program.  Efforts were made to edit the dictations but occasionally words are mis-transcribed.)    Jacques Dick MD, [facility]  Attending Emergency Medicine Physician                  Jacques Dick MD  09/23/21 Jeffry Caal MD  09/23/21 1408

## 2021-09-24 LAB
CAMPYLOBACTER PCR: NORMAL
CULTURE: NO GROWTH
E COLI ENTEROTOXIGENIC PCR: NORMAL
Lab: NORMAL
PLESIOMONAS SHIGELLOIDES PCR: NORMAL
SALMONELLA PCR: NORMAL
SHIGATOXIN GENE PCR: NORMAL
SHIGELLA SP PCR: NORMAL
SPECIMEN DESCRIPTION: NORMAL
SPECIMEN DESCRIPTION: NORMAL
VIBRIO PCR: NORMAL
YERSINIA ENTEROCOLITICA PCR: NORMAL

## 2021-09-27 LAB
DIRECT EXAM: NORMAL
DIRECT EXAM: NORMAL
Lab: NORMAL
SPECIMEN DESCRIPTION: NORMAL

## 2021-12-03 ENCOUNTER — HOSPITAL ENCOUNTER (EMERGENCY)
Age: 66
Discharge: HOME | End: 2021-12-03
Attending: EMERGENCY MEDICINE
Payer: COMMERCIAL

## 2021-12-03 ENCOUNTER — APPOINTMENT (OUTPATIENT)
Dept: CT IMAGING | Age: 66
End: 2021-12-03
Payer: COMMERCIAL

## 2021-12-03 VITALS
BODY MASS INDEX: 23.55 KG/M2 | SYSTOLIC BLOOD PRESSURE: 141 MMHG | TEMPERATURE: 98.8 F | RESPIRATION RATE: 16 BRPM | DIASTOLIC BLOOD PRESSURE: 67 MMHG | WEIGHT: 159 LBS | HEIGHT: 69 IN | OXYGEN SATURATION: 98 % | HEART RATE: 71 BPM

## 2021-12-03 DIAGNOSIS — R10.9 ABDOMINAL PAIN, UNSPECIFIED ABDOMINAL LOCATION: Primary | ICD-10-CM

## 2021-12-03 LAB
-: NORMAL
ABSOLUTE EOS #: 0 K/UL (ref 0–0.44)
ABSOLUTE IMMATURE GRANULOCYTE: 0 K/UL (ref 0–0.3)
ABSOLUTE LYMPH #: 0.55 K/UL (ref 1.1–3.7)
ABSOLUTE MONO #: 0.25 K/UL (ref 0.1–1.2)
ALBUMIN SERPL-MCNC: 4.2 G/DL (ref 3.5–5.2)
ALBUMIN/GLOBULIN RATIO: 2 (ref 1–2.5)
ALP BLD-CCNC: 53 U/L (ref 35–104)
ALT SERPL-CCNC: 28 U/L (ref 5–33)
AMORPHOUS: NORMAL
AMYLASE: 66 U/L (ref 28–100)
ANION GAP SERPL CALCULATED.3IONS-SCNC: 12 MMOL/L (ref 9–17)
AST SERPL-CCNC: 24 U/L
BACTERIA: NORMAL
BASOPHILS # BLD: 0 % (ref 0–2)
BASOPHILS ABSOLUTE: 0 K/UL (ref 0–0.2)
BILIRUB SERPL-MCNC: 0.25 MG/DL (ref 0.3–1.2)
BILIRUBIN DIRECT: 0.11 MG/DL
BILIRUBIN URINE: NEGATIVE
BILIRUBIN, INDIRECT: 0.14 MG/DL (ref 0–1)
BUN BLDV-MCNC: 19 MG/DL (ref 8–23)
BUN/CREAT BLD: 12 (ref 9–20)
CALCIUM SERPL-MCNC: 9.4 MG/DL (ref 8.6–10.4)
CASTS UA: NORMAL /LPF (ref 0–2)
CHLORIDE BLD-SCNC: 103 MMOL/L (ref 98–107)
CO2: 23 MMOL/L (ref 20–31)
COLOR: ABNORMAL
COMMENT UA: ABNORMAL
CREAT SERPL-MCNC: 1.58 MG/DL (ref 0.5–0.9)
CRYSTALS, UA: NORMAL /HPF
DIFFERENTIAL TYPE: ABNORMAL
EOSINOPHILS RELATIVE PERCENT: 0 % (ref 1–4)
EPITHELIAL CELLS UA: NORMAL /HPF (ref 0–5)
GFR AFRICAN AMERICAN: 40 ML/MIN
GFR NON-AFRICAN AMERICAN: 33 ML/MIN
GFR SERPL CREATININE-BSD FRML MDRD: ABNORMAL ML/MIN/{1.73_M2}
GFR SERPL CREATININE-BSD FRML MDRD: ABNORMAL ML/MIN/{1.73_M2}
GLOBULIN: 2.1 G/DL (ref 1.5–3.8)
GLUCOSE BLD-MCNC: 137 MG/DL (ref 70–99)
GLUCOSE URINE: NEGATIVE
HCT VFR BLD CALC: 30 % (ref 36.3–47.1)
HEMOGLOBIN: 9.6 G/DL (ref 11.9–15.1)
IMMATURE GRANULOCYTES: 0 %
KETONES, URINE: NEGATIVE
LACTIC ACID, SEPSIS WHOLE BLOOD: NORMAL MMOL/L (ref 0.5–1.9)
LACTIC ACID, SEPSIS: 0.8 MMOL/L (ref 0.5–1.9)
LEUKOCYTE ESTERASE, URINE: NEGATIVE
LIPASE: 31 U/L (ref 13–60)
LYMPHOCYTES # BLD: 11 % (ref 24–43)
MCH RBC QN AUTO: 27.4 PG (ref 25.2–33.5)
MCHC RBC AUTO-ENTMCNC: 32 G/DL (ref 25.2–33.5)
MCV RBC AUTO: 85.7 FL (ref 82.6–102.9)
MONOCYTES # BLD: 5 % (ref 3–12)
MORPHOLOGY: ABNORMAL
MORPHOLOGY: ABNORMAL
MUCUS: NORMAL
NITRITE, URINE: NEGATIVE
NRBC AUTOMATED: 0 PER 100 WBC
OTHER OBSERVATIONS UA: NORMAL
PDW BLD-RTO: 15.2 % (ref 11.8–14.4)
PH UA: 6 (ref 5–6)
PLATELET # BLD: 164 K/UL (ref 138–453)
PLATELET ESTIMATE: ABNORMAL
PMV BLD AUTO: 9 FL (ref 8.1–13.5)
POTASSIUM SERPL-SCNC: 4.4 MMOL/L (ref 3.7–5.3)
PROTEIN UA: NEGATIVE
RBC # BLD: 3.5 M/UL (ref 3.95–5.11)
RBC # BLD: ABNORMAL 10*6/UL
RBC UA: NORMAL /HPF (ref 0–4)
RENAL EPITHELIAL, UA: NORMAL /HPF
SEG NEUTROPHILS: 84 % (ref 36–65)
SEGMENTED NEUTROPHILS ABSOLUTE COUNT: 4.2 K/UL (ref 1.5–8.1)
SODIUM BLD-SCNC: 138 MMOL/L (ref 135–144)
SPECIFIC GRAVITY UA: 1 (ref 1.01–1.02)
TOTAL PROTEIN: 6.3 G/DL (ref 6.4–8.3)
TRICHOMONAS: NORMAL
TURBIDITY: ABNORMAL
URINE HGB: NEGATIVE
UROBILINOGEN, URINE: NORMAL
WBC # BLD: 5 K/UL (ref 3.5–11.3)
WBC # BLD: ABNORMAL 10*3/UL
WBC UA: NORMAL /HPF (ref 0–4)
YEAST: NORMAL

## 2021-12-03 PROCEDURE — 96374 THER/PROPH/DIAG INJ IV PUSH: CPT

## 2021-12-03 PROCEDURE — 80076 HEPATIC FUNCTION PANEL: CPT

## 2021-12-03 PROCEDURE — 99284 EMERGENCY DEPT VISIT MOD MDM: CPT

## 2021-12-03 PROCEDURE — 83605 ASSAY OF LACTIC ACID: CPT

## 2021-12-03 PROCEDURE — 6360000002 HC RX W HCPCS: Performed by: EMERGENCY MEDICINE

## 2021-12-03 PROCEDURE — 81001 URINALYSIS AUTO W/SCOPE: CPT

## 2021-12-03 PROCEDURE — 2580000003 HC RX 258: Performed by: EMERGENCY MEDICINE

## 2021-12-03 PROCEDURE — 83690 ASSAY OF LIPASE: CPT

## 2021-12-03 PROCEDURE — 36415 COLL VENOUS BLD VENIPUNCTURE: CPT

## 2021-12-03 PROCEDURE — 80048 BASIC METABOLIC PNL TOTAL CA: CPT

## 2021-12-03 PROCEDURE — 74176 CT ABD & PELVIS W/O CONTRAST: CPT

## 2021-12-03 PROCEDURE — 85025 COMPLETE CBC W/AUTO DIFF WBC: CPT

## 2021-12-03 PROCEDURE — 96375 TX/PRO/DX INJ NEW DRUG ADDON: CPT

## 2021-12-03 PROCEDURE — 82150 ASSAY OF AMYLASE: CPT

## 2021-12-03 RX ORDER — OXYCODONE HYDROCHLORIDE AND ACETAMINOPHEN 5; 325 MG/1; MG/1
1-2 TABLET ORAL EVERY 6 HOURS PRN
Qty: 18 TABLET | Refills: 0 | Status: SHIPPED | OUTPATIENT
Start: 2021-12-03 | End: 2021-12-10

## 2021-12-03 RX ORDER — GABAPENTIN 400 MG/1
400 CAPSULE ORAL 2 TIMES DAILY
COMMUNITY

## 2021-12-03 RX ORDER — 0.9 % SODIUM CHLORIDE 0.9 %
500 INTRAVENOUS SOLUTION INTRAVENOUS ONCE
Status: COMPLETED | OUTPATIENT
Start: 2021-12-03 | End: 2021-12-03

## 2021-12-03 RX ORDER — ONDANSETRON 2 MG/ML
4 INJECTION INTRAMUSCULAR; INTRAVENOUS ONCE
Status: COMPLETED | OUTPATIENT
Start: 2021-12-03 | End: 2021-12-03

## 2021-12-03 RX ADMIN — SODIUM CHLORIDE 500 ML: 9 INJECTION, SOLUTION INTRAVENOUS at 17:38

## 2021-12-03 RX ADMIN — HYDROMORPHONE HYDROCHLORIDE 0.5 MG: 1 INJECTION, SOLUTION INTRAMUSCULAR; INTRAVENOUS; SUBCUTANEOUS at 17:39

## 2021-12-03 RX ADMIN — ONDANSETRON 4 MG: 2 INJECTION INTRAMUSCULAR; INTRAVENOUS at 17:14

## 2021-12-03 ASSESSMENT — PAIN SCALES - GENERAL
PAINLEVEL_OUTOF10: 10
PAINLEVEL_OUTOF10: 8

## 2021-12-03 ASSESSMENT — PAIN DESCRIPTION - ORIENTATION
ORIENTATION: MID
ORIENTATION: MID

## 2021-12-03 ASSESSMENT — PAIN DESCRIPTION - DESCRIPTORS
DESCRIPTORS: CRAMPING;SHARP
DESCRIPTORS: CRAMPING;SHARP

## 2021-12-03 ASSESSMENT — PAIN DESCRIPTION - FREQUENCY: FREQUENCY: INTERMITTENT

## 2021-12-03 ASSESSMENT — PAIN DESCRIPTION - PROGRESSION: CLINICAL_PROGRESSION: GRADUALLY WORSENING

## 2021-12-03 ASSESSMENT — PAIN DESCRIPTION - LOCATION
LOCATION: ABDOMEN
LOCATION: ABDOMEN

## 2021-12-03 ASSESSMENT — PAIN DESCRIPTION - PAIN TYPE
TYPE: ACUTE PAIN;CHRONIC PAIN
TYPE: ACUTE PAIN;CHRONIC PAIN

## 2021-12-03 ASSESSMENT — PAIN DESCRIPTION - ONSET: ONSET: GRADUAL

## 2021-12-03 NOTE — ED PROVIDER NOTES
[facility] ED  [address]  [place] [address]  Phone: [phone]  Eric      Pt Name: oR Wheatley  MRN: [id number]  [place] 1955  Date of evaluation: 12/3/2021    CHIEF COMPLAINT       Chief Complaint   Patient presents with    Abdominal Pain    Diarrhea       HISTORY OF PRESENT ILLNESS    Ro Wheatley is a 61 y.o. female who presents to the emergency room complaining of abdominal pain across her abdomen for the past several months off and on worse over the past couple days. Seen here back in September ultimately an unremarkable work-up. She has been seen twice in [place] for the same and has been admitted. She has had an upper and lower scope which were unremarkable. She is status post liver transplant in 2019 secondary to sclerosing cholangitis as well as kidney transplant around the same time due to kidney failure. Work-up at [facility] resulted in the possible diagnosis of pain secondary more likely to scar tissue but no significant pathology was found. She continues to have pain off and on as she rates 10 out of 10. Denies any fevers or chills admits to diarrhea no blood in her stool. No chest pain or shortness of breath.     REVIEW OF SYSTEMS       Constitutional: No fevers or chills   HEENT: No sore throat, rhinorrhea, or earache   Eyes: No blurry vision or double vision no drainage   Cardiovascular: No chest pain or tachycardia   Respiratory: No wheezing or shortness of breath no cough   Gastrointestinal: Positive nausea and diarrhea positive abdominal pain  : No hematuria or dysuria   Musculoskeletal: No swelling or pain   Skin: No rash   Neurological: No focal neurologic complaints, paresthesias, weakness, or headache     PAST MEDICAL HISTORY    has a past medical history of Anxiety, Breast disease, Choroidal neovascularization, Chronic abdominal pain, Chronic kidney disease, Depression, Diabetes mellitus, type 2 ([address] Diarrhea, Dyslipidemia, 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 SURGICAL HISTORY      has a past surgical history that includes Colon surgery; Breast surgery; liver biopsy; Liver transplant (2004); Abdominal hernia repair (2005); ventral hernia repair (2008); hernia repair (2014, revision );  section; Colonoscopy; Upper gastrointestinal endoscopy; Cholecystectomy; Appendectomy; Atrial ablation surgery (, 12/10/14); eye surgery; Breast biopsy (Left, 2014); retinal laser (Bilateral); Cataract removal with implant (Right, 2017); Cataract removal with implant (Left, 2017); pr xcapsl ctrc rmvl insj io lens prosth w/o ecp (Right, 2017); pr xcapsl ctrc rmvl insj io lens prosth w/o ecp (Left, 2017); and Kidney transplant. CURRENT MEDICATIONS       Discharge Medication List as of 12/3/2021  6:38 PM      CONTINUE these medications which have NOT CHANGED    Details   gabapentin (NEURONTIN) 400 MG capsule Take 400 mg by mouth 2 times daily. Historical Med      pantoprazole (PROTONIX) 40 MG tablet Take 40 mg by mouth dailyHistorical Med      levothyroxine (SYNTHROID) 150 MCG tablet Take 150 mcg by mouth dailyHistorical Med      sertraline (ZOLOFT) 100 MG tablet Take 100 mg by mouth dailyHistorical Med      Multiple Vitamins-Iron (MULTIVITAMIN/IRON PO) Take by mouth dailyHistorical Med      HUMALOG KWIKPEN 100 UNIT/ML SOPN inject INSULIN every morning and at other is unknown. She indicated that the status of her neg hx is unknown.     family history includes COPD in her mother; Cirrhosis in an other family member; Diabetes in her father; Heart Disease in her mother. SOCIAL HISTORY      reports that she quit smoking about 45 years ago. She has a 1.60 pack-year smoking history. She has never used smokeless tobacco. She reports that she does not drink alcohol and does not use drugs. PHYSICAL EXAM       ED Triage Vitals [12/03/21 1618]   BP Temp Temp Source Pulse Resp SpO2 Height Weight   (!) 141/67 98.8 °F (37.1 °C) Tympanic 82 16 98 % 5' 9\" (1.753 m) 159 lb (72.1 kg)     Constitutional: Alert, oriented x3, nontoxic, answering questions appropriately, acting properly for age, in no acute distress   HEENT: Extraocular muscles intact, mucus membranes dry no posterior pharyngeal erythema or exudates, Pupils equal, round, reactive to light,   Neck: Trachea midline   Cardiovascular: Regular rhythm and rate no murmurs   Respiratory: Clear to auscultation bilaterally no wheezes, rhonchi, rales, no respiratory distress no tachypnea no retractions no hypoxia  Gastrointestinal: Soft, mild diffuse tenderness, nondistended, diminished bowel sounds. No rebound, rigidity, or guarding. Positive hepatomegaly  Musculoskeletal: No extremity pain or swelling no calf tenderness or asymmetry  Neurologic: Moving all 4 extremities without difficulty there are no gross focal neurologic deficits   Skin: Warm and dry     DIFFERENTIAL DIAGNOSIS/ MDM:     IV fluids and labs. Pain control. CT abdomen and pelvis.     DIAGNOSTIC RESULTS     EKG: All EKG's are interpreted by the Emergency Department Physician who either signs or Co-signs this chart in the absence of a cardiologist.        Not indicated unless otherwise documented above    LABS:  Results for orders placed or performed during the hospital encounter of 12/03/21   CBC Auto Differential   Result Value Ref Range    WBC 5.0 3.5 - 11.3 k/uL    RBC 3.50 (L) 3.95 - 5.11 m/uL    Hemoglobin 9.6 (L) 11.9 - 15.1 g/dL    Hematocrit 30.0 (L) 36.3 - 47.1 %    MCV 85.7 82.6 - 102.9 fL    MCH 27.4 25.2 - 33.5 pg    MCHC 32.0 25.2 - 33.5 g/dL    RDW 15.2 (H) 11.8 - 14.4 %    Platelets 755 269 - 066 k/uL    MPV 9.0 8.1 - 13.5 fL    NRBC Automated 0.0 0.0 per 100 WBC    Differential Type NOT REPORTED     WBC Morphology NOT REPORTED     RBC Morphology NOT REPORTED     Platelet Estimate NOT REPORTED     Monocytes 5 3 - 12 %    Lymphocytes 11 (L) 24 - 43 %    Seg Neutrophils 84 (H) 36 - 65 %    Eosinophils % 0 (L) 1 - 4 %    Basophils 0 0 - 2 %    Immature Granulocytes 0 0 %    Absolute Mono # 0.25 0.10 - 1.20 k/uL    Absolute Lymph # 0.55 (L) 1.10 - 3.70 k/uL    Segs Absolute 4.20 1.50 - 8.10 k/uL    Absolute Eos # 0.00 0.00 - 0.44 k/uL    Basophils Absolute 0.00 0.00 - 0.20 k/uL    Absolute Immature Granulocyte 0.00 0.00 - 0.30 k/uL    Morphology Platelet count adequate     Morphology ANISOCYTOSIS PRESENT    Basic Metabolic Panel   Result Value Ref Range    Glucose 137 (H) 70 - 99 mg/dL    BUN 19 8 - 23 mg/dL    CREATININE 1.58 (H) 0.50 - 0.90 mg/dL    Bun/Cre Ratio 12 9 - 20    Calcium 9.4 8.6 - 10.4 mg/dL    Sodium 138 135 - 144 mmol/L    Potassium 4.4 3.7 - 5.3 mmol/L    Chloride 103 98 - 107 mmol/L    CO2 23 20 - 31 mmol/L    Anion Gap 12 9 - 17 mmol/L    GFR Non-African American 33 (L) >60 mL/min    GFR African American 40 (L) >60 mL/min    GFR Comment          GFR Staging NOT REPORTED    Amylase   Result Value Ref Range    Amylase 66 28 - 100 U/L   Lipase   Result Value Ref Range    Lipase 31 13 - 60 U/L   Hepatic Function Panel   Result Value Ref Range    Albumin 4.2 3.5 - 5.2 g/dL    Alkaline Phosphatase 53 35 - 104 U/L    ALT 28 5 - 33 U/L    AST 24 <32 U/L    Total Bilirubin 0.25 (L) 0.3 - 1.2 mg/dL    Bilirubin, Direct 0.11 <0.31 mg/dL    Bilirubin, Indirect 0.14 0.00 - 1.00 mg/dL    Total Protein 6.3 (L) 6.4 - 8.3 g/dL    Globulin 2.1 1.5 - 3.8 g/dL Albumin/Globulin Ratio 2.0 1.0 - 2.5   Lactate, Sepsis   Result Value Ref Range    Lactic Acid, Sepsis 0.8 0.5 - 1.9 mmol/L    Lactic Acid, Sepsis, Whole Blood NOT REPORTED 0.5 - 1.9 mmol/L   Urinalysis Reflex to Culture    Specimen: Urine, clean catch   Result Value Ref Range    Color, UA NOT REPORTED Yellow    Turbidity UA NOT REPORTED Clear    Glucose, Ur NEGATIVE NEGATIVE    Bilirubin Urine NEGATIVE NEGATIVE    Ketones, Urine NEGATIVE NEGATIVE    Specific Gravity, UA 1.005 (L) 1.010 - 1.025    Urine Hgb NEGATIVE NEGATIVE    pH, UA 6.0 5.0 - 6.0    Protein, UA NEGATIVE NEGATIVE    Urobilinogen, Urine Normal Normal    Nitrite, Urine NEGATIVE NEGATIVE    Leukocyte Esterase, Urine NEGATIVE NEGATIVE    Urinalysis Comments NOT REPORTED    Microscopic Urinalysis   Result Value Ref Range    -          WBC, UA None 0 - 4 /HPF    RBC, UA 0 TO 4 0 - 4 /HPF    Casts UA NOT REPORTED 0 - 2 /LPF    Crystals, UA NOT REPORTED None /HPF    Epithelial Cells UA None 0 - 5 /HPF    Renal Epithelial, UA NOT REPORTED 0 /HPF    Bacteria, UA None None    Mucus, UA NOT REPORTED None    Trichomonas, UA NOT REPORTED None    Amorphous, UA NOT REPORTED None    Other Observations UA NOT REPORTED NOT REQ. Yeast, UA NOT REPORTED None       Not indicated unless otherwise documented above    RADIOLOGY:   I reviewed the radiologist interpretations:    CT ABDOMEN PELVIS WO CONTRAST Additional Contrast? None   Final Result   Limited noncontrast examination. Extensive postsurgical changes of the abdomen and pelvis as above including   liver and right pelvis kidney transplants as well as presumed colectomy. Left anterior abdominal wall hernia with partial protrusion of the small   bowel loops, which is borderline prominent measuring 3 cm with air-fluid   level. This may reflect slow transit without belinda upstream obstruction seen   at this time. Fluid seen within the rectum, which may reflect underlying diarrheal disease. Not indicated unless otherwise documented above    EMERGENCY DEPARTMENT COURSE:     The patient was given the following medications:  Orders Placed This Encounter   Medications    0.9 % sodium chloride bolus    ondansetron (ZOFRAN) injection 4 mg    HYDROmorphone (DILAUDID) injection 0.5 mg    oxyCODONE-acetaminophen (PERCOCET) 5-325 MG per tablet     Sig: Take 1-2 tablets by mouth every 6 hours as needed for Pain for up to 7 days. OARRS Reviewed: ICD-10-CM Diagnosis Code R52 : Pain     Dispense:  18 tablet     Refill:  0        Vitals:   -------------------------  BP (!) 141/67   Pulse 71   Temp 98.8 °F (37.1 °C) (Tympanic)   Resp 16   Ht 5' 9\" (1.753 m)   Wt 72.1 kg (159 lb)   SpO2 98%   BMI 23.48 kg/m²     6:20 PM normal electrolytes normal liver function n chronic anemia. Normal white blood cell count. CT abdomen and pelvis shows extensive postsurgical changes nothing acute. Patient still having discomfort has multiple allergies cannot really take much at home. She is willing to be discharged home her abdomen is nonsurgical.  She can return if worsening symptoms or any other concerns. Normal lactic acid level. The patient understands that at this time there is no evidence for a more malignant underlying process, but also understands that early in the process of an illness or injury, an emergency department workup can be falsely reassuring. Routine discharge counseling was given, and it is understood that worsening, changing or persistent symptoms should prompt an immediate call or follow up with their primary physician or return to the emergency department. The importance of appropriate follow up was also discussed. I have reviewed the disposition diagnosis. I have answered the questions and given discharge instructions. There was voiced understanding of these instructions and no further questions or complaints.     CRITICAL CARE:    None    CONSULTS:    None    PROCEDURES:    None      OARRS Report if indicated    Periodic Controlled Substance Monitoring: No signs of potential drug abuse or diversion identified. (Radhames Bronson DO)        FINAL IMPRESSION      1. Abdominal pain, unspecified abdominal location          DISPOSITION/PLAN   DISPOSITION Decision To Discharge 12/03/2021 06:23:32 PM        CONDITION ON DISPOSITION: STABLE       PATIENT REFERRED TO:  MD [address]  [id number]  [phone]    Schedule an appointment as soon as possible for a visit in 3 days        DISCHARGE MEDICATIONS:  Discharge Medication List as of 12/3/2021  6:38 PM      START taking these medications    Details   oxyCODONE-acetaminophen (PERCOCET) 5-325 MG per tablet Take 1-2 tablets by mouth every 6 hours as needed for Pain for up to 7 days.  OARRS Reviewed: ICD-10-CM Diagnosis Code R52 : Pain, Disp-18 tablet, R-0Normal             (Please note that portions of this note were completed with a voice recognition program.  Efforts were made to edit the dictations but occasionally words are mis-transcribed.)    Raz Stephenson DO   Attending Emergency Physician     Raz Stephenson,   12/03/21 [address],   12/06/21 [id number]

## 2022-01-13 NOTE — PROGRESS NOTES
Angela Roldan is a 63 y.o. female here for a complete eye exam.      Chief Complaint   Patient presents with    Annual Exam       HPI     Patient is here today to establish care  Last complete exam:06/07/17  Last glasses rx:09/27/17  Cataract sx:OU-2017  Diabetes: NIDDM  HgbA1c:5.4-6/18/18          ROS      Main Ophthalmology Exam     Slit Lamp Exam       Right Left    Lids/Lashes Mild Blepharitis. Mild MGD Mild Blepharitis. Mild MGD    Conjunctiva/Sclera Clear and white Clear and white    Cornea Clear Clear    Anterior Chamber Deep, no cells, no flare Deep, no cells, no flare    Iris Round and reactive Round and reactive    Lens PCIOL PCIOL          Fundus Exam       Right Left    Vitreous Vitreous syneresis Vitreous syneresis    Disc No edema and no pallor. Peripapillary CRS. No edema and no pallor. Peripapillary CRS. C/D Ratio Vertical 0.35 0.35    C/D Ratio Horizontal 0.35 0.25    Macula Loss of foveal architecture Loss of foveal architecture    Vessels Normal course and caliber Normal course and caliber    Periphery No breaks, tears, or detachments No breaks, tears, or detachments                   Tonometry     Tonometry (Non-contact air puff, 3:07 PM)       Right Left    Pressure 12 14              Visual Acuity     Visual Acuity (Snellen - Linear)       Right Left    Dist cc 20/20 2 20/20    Correction:  Glasses               Not recorded          Not recorded          Not recorded         Not recorded          Past Medical History:   Diagnosis Date    Anxiety     Breast disease     History of fibroadenomatous breast disease with biopsy x 2.    Choroidal neovascularization     Peripapillary with hemorrhage, Dr. Mellissa Evans, 2012    Chronic abdominal pain     Ada has attributed to pouchitis and possible adhesions. Extensive repeated eval.  EGD 9/13.   dr Clay Ernst also following  Repeat egd 11/28/14 and , Pouchoscopy 11/28/14 Dr Stephania Gaytan  EGD- patchy gastritis, repeat EGD 4/16 Dr Carla Ferrara 9/15 Last ov 11/19  Next ov 02/14 [address] [place] [address]    Chronic kidney disease     1.)Creatine clearance 58 05/06 and total urine protein 405. 2.)Dr. Dk Dahl, [address] [place] [address], felt this was secondary to Prograf and rapamune 6/06, 3.)history of hyperkalemia leading to discontinuatuin of ACE inhibitor. 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 Eval Dr. Sanam Estrada clinic 2013 recommended watchful waiting. 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  GERD (gastroesophageal reflux disease)     EGD 9/13. dr Jeff Montalvo also following  Repeat egd 11/28/14 and ,  EGD- patchy gastritis, repeat EGD 4/16 Dr Merrick Meek Headache(784.0)     Herniated cervical disc 03/2009    C6-C7 with compression of the thecal sac most significantly on MRI. 1.1) Dr. Shannon almanza recommended a 2nd opinion from neurology. 1.2) Had a 2nd opinion for Dr. Puja Georges who referred to a neurosurgeon in [address] [place] [address] for another opinion. 1.3)  That surgeon, Dr. Yuni Wolfe felt surgery was not warranted and he would recommend PT or possible injection therapy.  History of atrial fibrillation     Stress test negative October 2014. Echo with moderate MR normal ejection fraction September 2014. Ablation performed December 2014.  Hypertension     Hypothyroidism     Interstitial cystitis     Dr. Laura Theodore    Iron deficiency anemia     Leukocytoclastic vasculitis ([facility]     possible    Liver transplanted [facility])     [facility] following.   Liver Bx 9/13    MGUS (monoclonal gammopathy of unknown significance)     Trace, Dr. Dk Dahl  -SPEP nl 2009    Migraine headache     evaluation, Dr Chidi Rader, [facility] Mitral valve regurgitation     mod on echo 9/14, & 4/16, stable 8/17    Osteoporosis     1.) DEXA, 03/05, T -1.1 spine, T -3.1 hip 2.) DEXA, 12/07, T -0.8 spine, T -1.9 spine 3.) DEXA, 03/10, T -0.2 spine, T -1.7 hip  4) DEXA 7/15 FRAX 6.7% 10 yr risk hip. [facility] prefers no Prolia due to infection risk, and GFR 30    Peripheral neuropathy ([address]     emg 2014- severe    Pouchitis ([address] 2006    Versus small bowel bacterial overgrowth, Dr. Mauricio Holt specialist [facility]. 1.1) [facility] 8/07 for 12 days with dx being pouchitis. 1.2) panniculitis, 8/07, [facility]. 1.3) Repeat scope, Dr. Tracie Eli, done on pouch 6/11 being okay. Repeat pouchoscopy 9/13& 4/16 [facility]    Primary sclerosing cholangitis     1.1) Following with liver transplant team at [facility] and with Dr. Heidi Gardiner, gastroenterologist at [facility]. 1.2) Multiple stent replacements in the biliary tract for this about every 6 months. 1.3) Liver transplant, 11/04. Dr Alyssa Joyner, Dr Tamera Navas, Transplant coordinator for labs etc. 1.4) Liver biopsy, 6/06.  SVT (supraventricular tachycardia) ([facility]     ablated [facility] 2004    Ulcerative colitis ([address]     Status post colectomy. 1.1) Colonoscopy 1/04, Dr. Nicolasa Pritchett, with evidence of marked chronic inflammatory changes. 1.2) Repeat pouchoscopy/colonoscopy, 1/4/06, Dr. Nicolasa Pritchett, with evidence of pouchitis. 1.3) Repeat colonoscopy, Dr. Avani Obrien, 6/06. 1.4) Repeat colonoscopy 8/09, Dr. Tracie Eli. 1.5) Repeat colonoscopy, Dr. Tracie Eli 04/12.     Vitamin D deficiency           Current Outpatient Prescriptions:     sertraline (ZOLOFT) 100 MG tablet, TAKE 1 TABLET DAILY  ALONG WITH A 50 MG TABLET (TOTAL  MG DAILY), Disp: 90 tablet, Rfl: 3    clonazePAM (KLONOPIN) 1 MG tablet, TAKE 1 TABLET BY MOUTH THREE TIMES DAILY AS NEEDED., Disp: 90 tablet, Rfl: 0    levothyroxine (SYNTHROID) 125 MCG tablet, take 1 tablet by mouth once daily, Disp: 30 tablet, Rfl: 11    raloxifene (EVISTA) 60 MG tablet, TAKE 1 TABLET DAILY, Disp: 90 tablet, Rfl: 3  

## 2022-02-09 ENCOUNTER — OFFICE VISIT (OUTPATIENT)
Dept: CARDIOLOGY | Age: 67
End: 2022-02-09
Payer: COMMERCIAL

## 2022-02-09 VITALS
HEART RATE: 78 BPM | WEIGHT: 164 LBS | DIASTOLIC BLOOD PRESSURE: 53 MMHG | BODY MASS INDEX: 24.29 KG/M2 | HEIGHT: 69 IN | SYSTOLIC BLOOD PRESSURE: 100 MMHG

## 2022-02-09 DIAGNOSIS — I48.91 ATRIAL FIBRILLATION, UNSPECIFIED TYPE (HCC): Primary | ICD-10-CM

## 2022-02-09 PROCEDURE — 93000 ELECTROCARDIOGRAM COMPLETE: CPT | Performed by: INTERNAL MEDICINE

## 2022-02-09 PROCEDURE — 99214 OFFICE O/P EST MOD 30 MIN: CPT | Performed by: INTERNAL MEDICINE

## 2022-02-09 NOTE — PROGRESS NOTES
Today's Date: 2022  Patient's Name: Ghazal Chappell  Patient's age: 61 y.o., 1955    Subjective:  Ghazal Chappell is being seen in clinic today regarding h/o afib    she is here for follow up. She denies any chest pain or dyspnea. No PND, no syncope or pre-syncope, no orthopnea. 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 Past Surgical History:   has a past surgical history that includes Colon surgery; Breast surgery; liver biopsy; Liver transplant (2004); Abdominal hernia repair (2005); ventral hernia repair (2008); hernia repair (2014, revision );  section; Colonoscopy; Upper gastrointestinal endoscopy; Cholecystectomy; Appendectomy; Atrial ablation surgery (, 12/10/14); eye surgery; Breast biopsy (Left, 2014); retinal laser (Bilateral); Cataract removal with implant (Right, 2017); Cataract removal with implant (Left, 2017); pr xcapsl ctrc rmvl insj io lens prosth w/o ecp (Right, 2017); pr xcapsl ctrc rmvl insj io lens prosth w/o ecp (Left, 2017); and Kidney transplant. Home Medications:  Prior to Admission medications    Medication Sig Start Date End Date Taking? Authorizing Provider   gabapentin (NEURONTIN) 400 MG capsule Take 400 mg by mouth 2 times daily. Historical Provider, MD   ondansetron (ZOFRAN) 4 MG tablet Take 1 tablet by mouth every 8 hours as needed for Nausea 9/22/21   Izabela Mcgee MD   pantoprazole (PROTONIX) 40 MG tablet Take 40 mg by mouth daily    Historical Provider, MD   levothyroxine (SYNTHROID) 150 MCG tablet Take 150 mcg by mouth daily    Historical Provider, MD   sertraline (ZOLOFT) 100 MG tablet Take 100 mg by mouth daily    Historical Provider, MD   Multiple Vitamins-Iron (MULTIVITAMIN/IRON PO) Take by mouth daily    Historical Provider, MD   Biotin 10 MG CAPS Take by mouth    Historical Provider, MD   HUMALOG KWIKPEN 100 UNIT/ML SOPN inject INSULIN every morning and at bedtime PER SLIDING SCALE:151...  (REFER TO PRESCRIPTION NOTES).  4/12/21   Historical Provider, MD   HUMULIN N KWIKPEN 100 UNIT/ML injection pen INJECT [address] [facility] DAILY 4/16/21   Historical Provider, MD   metoprolol tartrate (LOPRESSOR) 25 MG tablet take 1/2 tablet by mouth twice a day 6/21/21   Historical Provider, MD   mycophenolate (CELLCEPT) 250 MG capsule take 4 capsules by mouth every morning and 4 capsules every evening 6/21/21   Historical Provider, MD   predniSONE (DELTASONE) 10 MG tablet Take 10 mg by mouth daily    Historical Provider, MD   clonazePAM (KLONOPIN) 1 MG tablet take 1 tablet by mouth three times a day if needed 7/18/19 12/3/21  HALINA Tellez - CNP   sulfamethoxazole-trimethoprim (BACTRIM DS;SEPTRA DS) 800-160 MG per tablet TAKE ONE-HALF (1/2) TABLET 5 DAYS A WEEK ( [address] ) 1/17/19   Robinson Baca MD   Vitamin D, Cholecalciferol, 400 units CAPS Take by mouth    Historical Provider, MD   ondansetron (ZOFRAN ODT) 4 MG disintegrating tablet Take 1 tablet by mouth every 8 hours as needed for Nausea 2/28/17   Rohan Mixon, DO   glucose blood VI test strips (ASCENSIA AUTODISC VI;ONE TOUCH ULTRA TEST VI) strip One touch ultra test strips. Test up to 4 times a day. 5/23/16   Darryn Garcia MD   PROGRAF 1 MG capsule Take 1 mg by mouth 8 times daily  6/26/14   Historical Provider, MD       Allergies:  Codeine, Tylenol with codeine #3 [acetaminophen-codeine], Vicodin [hydrocodone-acetaminophen], Ace inhibitors, Aspirin, Azulfidine [sulfasalazine], Laura-d [diphenhydramine], Elmiron [pentosan polysulfate sodium], Fentanyl, Medrol [methylprednisolone], Morphine, Other, Reglan [metoclopramide], Tramadol, and Phenergan [promethazine]    Social History:   reports that she quit smoking about 46 years ago. She has a 1.60 pack-year smoking history. She has never used smokeless tobacco. She reports that she does not drink alcohol and does not use drugs. Family History: family history includes COPD in her mother; Cirrhosis in an other family member; Diabetes in her father; Heart Disease in her mother. No h/o sudden cardiac death. No for premature CAD    REVIEW OF SYSTEMS:    · Constitutional: there has been no unanticipated weight loss. There's been No change in energy level, No change in activity level. · Eyes: No visual changes or diplopia. No scleral icterus. · ENT: No Headaches, hearing loss or vertigo. No mouth sores or sore throat. · Cardiovascular: see above  · Respiratory: see above  · Gastrointestinal: No abdominal pain, appetite loss, blood in stools. · Genitourinary: No dysuria, trouble voiding, or hematuria. · Musculoskeletal:  No gait disturbance, No weakness or joint complaints. · Integumentary: No rash or pruritis. · Neurological: No headache or diplopia. No tingling  · Psychiatric: No anxiety, or depression. · Endocrine: No temperature intolerance. · Hematologic/Lymphatic: No abnormal bruising or bleeding, blood clots or swollen lymph nodes. · Allergic/Immunologic: No nasal congestion or hives. PHYSICAL EXAM:      There were no vitals taken for this visit.    Constitutional and General Appearance: alert, cooperative, no distress and appears stated age  [de-identified]: PERRL, no cervical lymphadenopathy. No masses palpable. Normal oral mucosa  Respiratory:  · Normal excursion and expansion without use of accessory muscles  · Resp Auscultation: Good respiratory effort. No for increased work of breathing. On auscultation: clear to auscultation bilaterally  Cardiovascular:  · Heart tones are crisp and normal. regular S1 and S2.  · Jugular venous pulsation Normal  · The carotid upstroke is normal in amplitude and contour without delay or bruit   Abdomen:   · soft  · Bowel sounds present  Extremities:  ·  No edema  Neurological:  · Alert and oriented. Cardiac Data:  EKG: Ectopic atrial rhythm,. Seen previously as well. Labs:     CBC: No results for input(s): WBC, HGB, HCT, PLT in the last 72 hours. BMP: No results for input(s): NA, K, CO2, BUN, CREATININE, LABGLOM, GLUCOSE in the last 72 hours. PT/INR: No results for input(s): PROTIME, INR in the last 72 hours. FASTING LIPID PANEL:  Lab Results   Component Value Date    HDL 76 11/19/2018    LDLCALC 61 03/17/2017    TRIG 91 11/19/2018     LIVER PROFILE:No results for input(s): AST, ALT, LABALBU in the last 72 hours.     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    Encounter for long-term (current) use of other medications    Essential hypertension    Hypothyroidism    Chronic renal failure, stage 3 (moderate) (HCC)    Diabetes type 2, controlled ([facility]    Major depression, chronic    Encounter for chronic pain management    Herniated cervical disc    Chronic cerebral ischemia    VBI (vertebrobasilar insufficiency)    Cervical radiculopathy  Bilateral carpal tunnel syndrome    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 Grade I (mild) left ventricular diastolic dysfunction. Mild to moderate mitral regurgitation. Normal tricuspid valve leaflets. Trivial tricuspid regurgitation. Estimated right ventricular systolic pressure is 26 mmHg. No significant pericardial effusion is seen.     TTE 11/20/19 at [facility]:   - Exam indication: Nonsustained atrial fibrillation   - The left ventricle is normal in size. Left ventricular systolic function is   normal. EF = 57 ± 5% (2D biplane)   - The right ventricle is normal in size. Right ventricular systolic function is   normal.   - The left atrial cavity is mildly dilated. - There is moderate (2+) holosystolic mitral valve regurgitation. Regurgitant   orifice area (PISA) is 0.15 cm². - Exam was compared with the prior  echocardiographic exam performed on 8/23/19.    MR is increased on today's study.       Stress echo 7/1/19- Negative for ischemia. Moderate MR.     TTE 7/19/21  Summary  Left ventricular systolic function is normal.  Estimated Left ventricular ejection fraction 60 %. No doppler evidence of diastolic dysfunction. Aortic valve sclerosis without stenosis. Mild to Moderate mitral regurgitation. Normal function of other valves. No pericardial effusion. Nuclear stress test 7/19/21  IMPRESSION:  1. No fixed or reversible perfusion defect to suggest infarct or  ischemia. 2. Normal left ventricular ejection fraction. 3. Low risk study.     Assessment and plan:     - Atypical Chest pressure- evaluated by negative stress test 7/19/21. Stable.  No chest pain reported. -H/o atrial fibrillation with ablation 12/10/2014. She is allergic to ASA. Ectopic atrial rhythm also seen previously. Stable and asymptomatic.  -Mild to moderate mitral regurgitation on TTE 7/19/21- Stable. -DM- on insulin for 12 year.  -Hypothyroidism on replacement. -GERD- On omeprazole and famotidine  -Depression- on zoloft. 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 This was her second liver transplant. First transplant lasted 15 years. Underlying diagnosis is primary sclerosing cholangitis. On prograf and Cellcept  -Tremors from transplant medications. -RTC 6 months.         Susan Neil [id number]  [phone]

## 2022-06-02 ENCOUNTER — HOSPITAL ENCOUNTER (EMERGENCY)
Age: 67
Discharge: HOME | End: 2022-06-02
Attending: EMERGENCY MEDICINE
Payer: COMMERCIAL

## 2022-06-02 VITALS
SYSTOLIC BLOOD PRESSURE: 130 MMHG | WEIGHT: 163 LBS | HEART RATE: 84 BPM | BODY MASS INDEX: 24.14 KG/M2 | OXYGEN SATURATION: 95 % | HEIGHT: 69 IN | DIASTOLIC BLOOD PRESSURE: 87 MMHG | RESPIRATION RATE: 16 BRPM

## 2022-06-02 DIAGNOSIS — R10.9 CHRONIC ABDOMINAL PAIN: Primary | ICD-10-CM

## 2022-06-02 DIAGNOSIS — N39.0 URINARY TRACT INFECTION WITH HEMATURIA, SITE UNSPECIFIED: ICD-10-CM

## 2022-06-02 DIAGNOSIS — R31.9 URINARY TRACT INFECTION WITH HEMATURIA, SITE UNSPECIFIED: ICD-10-CM

## 2022-06-02 DIAGNOSIS — G89.29 CHRONIC ABDOMINAL PAIN: Primary | ICD-10-CM

## 2022-06-02 LAB
-: ABNORMAL
ABSOLUTE EOS #: 0 K/UL (ref 0–0.4)
ABSOLUTE IMMATURE GRANULOCYTE: 0 K/UL (ref 0–0.3)
ABSOLUTE LYMPH #: 0.34 K/UL (ref 1–4.8)
ABSOLUTE MONO #: 0.2 K/UL (ref 0.1–1.2)
ALBUMIN SERPL-MCNC: 4 G/DL (ref 3.5–5.2)
ALBUMIN/GLOBULIN RATIO: 1.4 (ref 1–2.5)
ALP BLD-CCNC: 76 U/L (ref 35–104)
ALT SERPL-CCNC: 21 U/L (ref 5–33)
AST SERPL-CCNC: 18 U/L
BACTERIA: ABNORMAL
BASOPHILS # BLD: 0 % (ref 0–1)
BASOPHILS ABSOLUTE: 0 K/UL (ref 0–0.2)
BILIRUB SERPL-MCNC: 0.25 MG/DL (ref 0.3–1.2)
BILIRUBIN DIRECT: 0.11 MG/DL
BILIRUBIN URINE: NEGATIVE
BILIRUBIN, INDIRECT: 0.14 MG/DL (ref 0–1)
EOSINOPHILS RELATIVE PERCENT: 0 % (ref 1–7)
EPITHELIAL CELLS UA: ABNORMAL /HPF (ref 0–5)
GLOBULIN: 2.9 G/DL (ref 1.5–3.8)
GLUCOSE URINE: NEGATIVE
HCT VFR BLD CALC: 31.2 % (ref 36.3–47.1)
HEMOGLOBIN: 9.9 G/DL (ref 11.9–15.1)
IMMATURE GRANULOCYTES: 0 %
KETONES, URINE: NEGATIVE
LEUKOCYTE ESTERASE, URINE: ABNORMAL
LIPASE: 41 U/L (ref 13–60)
LYMPHOCYTES # BLD: 5 % (ref 16–46)
MCH RBC QN AUTO: 26.9 PG (ref 25.2–33.5)
MCHC RBC AUTO-ENTMCNC: 31.7 G/DL (ref 25.2–33.5)
MCV RBC AUTO: 84.8 FL (ref 82.6–102.9)
MONOCYTES # BLD: 3 % (ref 4–11)
MORPHOLOGY: ABNORMAL
NITRITE, URINE: POSITIVE
NRBC AUTOMATED: 0 PER 100 WBC
OTHER OBSERVATIONS UA: ABNORMAL
PDW BLD-RTO: 14.3 % (ref 11.8–14.4)
PH UA: 6 (ref 5–6)
PLATELET # BLD: 189 K/UL (ref 138–453)
PMV BLD AUTO: 9.2 FL (ref 8.1–13.5)
PROTEIN UA: NEGATIVE
RBC # BLD: 3.68 M/UL (ref 3.95–5.11)
RBC UA: ABNORMAL /HPF (ref 0–4)
SEG NEUTROPHILS: 92 % (ref 43–77)
SEGMENTED NEUTROPHILS ABSOLUTE COUNT: 6.26 K/UL (ref 1.5–8.1)
SPECIFIC GRAVITY UA: 1.01 (ref 1.01–1.02)
TOTAL PROTEIN: 6.9 G/DL (ref 6.4–8.3)
URINE HGB: ABNORMAL
UROBILINOGEN, URINE: NORMAL
WBC # BLD: 6.8 K/UL (ref 3.5–11.3)
WBC UA: ABNORMAL /HPF (ref 0–4)

## 2022-06-02 PROCEDURE — 36415 COLL VENOUS BLD VENIPUNCTURE: CPT

## 2022-06-02 PROCEDURE — 83690 ASSAY OF LIPASE: CPT

## 2022-06-02 PROCEDURE — 87186 SC STD MICRODIL/AGAR DIL: CPT

## 2022-06-02 PROCEDURE — 6360000002 HC RX W HCPCS: Performed by: EMERGENCY MEDICINE

## 2022-06-02 PROCEDURE — 87077 CULTURE AEROBIC IDENTIFY: CPT

## 2022-06-02 PROCEDURE — 87086 URINE CULTURE/COLONY COUNT: CPT

## 2022-06-02 PROCEDURE — 80076 HEPATIC FUNCTION PANEL: CPT

## 2022-06-02 PROCEDURE — 85025 COMPLETE CBC W/AUTO DIFF WBC: CPT

## 2022-06-02 PROCEDURE — 6370000000 HC RX 637 (ALT 250 FOR IP): Performed by: EMERGENCY MEDICINE

## 2022-06-02 PROCEDURE — 81001 URINALYSIS AUTO W/SCOPE: CPT

## 2022-06-02 PROCEDURE — 99284 EMERGENCY DEPT VISIT MOD MDM: CPT

## 2022-06-02 PROCEDURE — 96372 THER/PROPH/DIAG INJ SC/IM: CPT

## 2022-06-02 RX ORDER — NORTRIPTYLINE HYDROCHLORIDE 25 MG/1
25 CAPSULE ORAL NIGHTLY
COMMUNITY
End: 2022-08-10

## 2022-06-02 RX ORDER — NITROFURANTOIN 25; 75 MG/1; MG/1
100 CAPSULE ORAL 2 TIMES DAILY
Qty: 14 CAPSULE | Refills: 0 | Status: SHIPPED | OUTPATIENT
Start: 2022-06-02 | End: 2022-06-09

## 2022-06-02 RX ORDER — NITROFURANTOIN 25; 75 MG/1; MG/1
100 CAPSULE ORAL ONCE
Status: COMPLETED | OUTPATIENT
Start: 2022-06-02 | End: 2022-06-02

## 2022-06-02 RX ORDER — ONDANSETRON 4 MG/1
4 TABLET, ORALLY DISINTEGRATING ORAL ONCE
Status: COMPLETED | OUTPATIENT
Start: 2022-06-02 | End: 2022-06-02

## 2022-06-02 RX ORDER — DICYCLOMINE HCL 20 MG
20 TABLET ORAL EVERY 6 HOURS
COMMUNITY
End: 2022-08-10

## 2022-06-02 RX ADMIN — ONDANSETRON 4 MG: 4 TABLET, ORALLY DISINTEGRATING ORAL at 19:50

## 2022-06-02 RX ADMIN — HYDROMORPHONE HYDROCHLORIDE 0.5 MG: 1 INJECTION, SOLUTION INTRAMUSCULAR; INTRAVENOUS; SUBCUTANEOUS at 19:50

## 2022-06-02 RX ADMIN — NITROFURANTOIN MONOHYDRATE/MACROCRYSTALS 100 MG: 75; 25 CAPSULE ORAL at 21:11

## 2022-06-02 ASSESSMENT — PAIN DESCRIPTION - DESCRIPTORS: DESCRIPTORS: CRAMPING;STABBING

## 2022-06-02 ASSESSMENT — PAIN SCALES - GENERAL
PAINLEVEL_OUTOF10: 9
PAINLEVEL_OUTOF10: 10
PAINLEVEL_OUTOF10: 10

## 2022-06-02 ASSESSMENT — PAIN - FUNCTIONAL ASSESSMENT: PAIN_FUNCTIONAL_ASSESSMENT: 0-10

## 2022-06-02 ASSESSMENT — PAIN DESCRIPTION - LOCATION: LOCATION: ABDOMEN

## 2022-06-03 NOTE — ED PROVIDER NOTES
eMERGENCY dEPARTMENT eNCOUnter      Pt Name: Cecy Fox  MRN: [id number]  [place] 1955  Date of evaluation: 6/2/2022      CHIEF COMPLAINT       Chief Complaint   Patient presents with    Abdominal Pain     pt reports \"off and on\" for a while, but this particular episode began 6-1-22, worse after eating    Nausea    Headache     began 5-31-22         HISTORY OF PRESENT ILLNESS    Cecy Fox is a 61 y.o. female who presents with abdominal pain. Patient states she has had a history of chronic abdominal pain since November is being seen at [place] [facility] has had multiple work-ups but they are uncertain what is going on it started up again yesterday she denies any fever chills she complains of some nausea no vomiting mild headache no exacerbating relieving factors pain is typical of what she has been having        REVIEW OF SYSTEMS       Review of systems are all reviewed and negative except stated above in HPI    [address]    has a past medical history of Anxiety, Breast disease, Choroidal neovascularization, Chronic abdominal pain, Chronic kidney disease, Depression, Diabetes mellitus, type 2 ([address] Diarrhea, Dyslipidemia, 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 SURGICAL HISTORY      has a past surgical history that includes Colon surgery; Breast surgery; liver biopsy; Liver transplant (11/2004); Abdominal hernia repair (08/2005); ventral hernia repair (06/2008); hernia repair (2014, revision );  section; Colonoscopy; Upper gastrointestinal endoscopy; Cholecystectomy; Appendectomy; Atrial ablation surgery (2004, 12/10/14); eye surgery; Breast biopsy (Left, 2014); retinal laser (Bilateral); Cataract removal with implant (Right, 2017); Cataract removal with implant (Left, 2017); pr xcapsl ctrc rmvl insj io lens prosth w/o ecp (Right, 2017); pr xcapsl ctrc rmvl insj io lens prosth w/o ecp (Left, 2017); and Kidney transplant. CURRENT MEDICATIONS       Previous Medications    BIOTIN 10 MG CAPS    Take by mouth    CLONAZEPAM (KLONOPIN) 1 MG TABLET    take 1 tablet by mouth three times a day if needed    DICYCLOMINE (BENTYL) 20 MG TABLET    Take 20 mg by mouth every 6 hours    GABAPENTIN (NEURONTIN) 400 MG CAPSULE    Take 400 mg by mouth 2 times daily. GLUCOSE BLOOD VI TEST STRIPS (ASCENSIA AUTODISC VI;ONE TOUCH ULTRA TEST VI) STRIP    One touch ultra test strips. Test up to 4 times a day. HUMALOG KWIKPEN 100 UNIT/ML SOPN    inject INSULIN every morning and at bedtime PER SLIDING SCALE:151...  (REFER TO PRESCRIPTION NOTES).     HUMULIN N KWIKPEN 100 UNIT/ML INJECTION PEN    INJECT 14 UNITS SUBCUTANEOUSLY ONCE DAILY    LEVOTHYROXINE (SYNTHROID) 150 MCG TABLET    Take 150 mcg by mouth daily    METOPROLOL TARTRATE (LOPRESSOR) 25 MG TABLET    take 1/2 tablet by mouth twice a day    MULTIPLE VITAMINS-IRON (MULTIVITAMIN/IRON PO)    Take by mouth daily    MYCOPHENOLATE (CELLCEPT) 250 MG CAPSULE    take 4 capsules by mouth every morning and 4 capsules every evening    NORTRIPTYLINE (PAMELOR) 25 MG CAPSULE    Take 25 mg by mouth nightly    ONDANSETRON (ZOFRAN ODT) 4 MG DISINTEGRATING TABLET    Take 1 tablet by mouth every 8 hours as needed for Nausea    ONDANSETRON (ZOFRAN) 4 MG TABLET    Take 1 tablet by mouth every 8 hours as needed for Nausea    PANTOPRAZOLE (PROTONIX) 40 MG TABLET    Take 40 mg by mouth daily    PREDNISONE (DELTASONE) 10 MG TABLET    Take 10 mg by mouth daily    PROGRAF 1 MG CAPSULE    Take 1 mg by mouth 8 times daily     SERTRALINE (ZOLOFT) 100 MG TABLET    Take 100 mg by mouth daily    SULFAMETHOXAZOLE-TRIMETHOPRIM (BACTRIM DS;SEPTRA DS) 800-160 MG PER TABLET    TAKE ONE-HALF (1/2) TABLET 5 DAYS A WEEK ( MONDAY THROUGH FRIDAY )    VITAMIN D, CHOLECALCIFEROL, 400 UNITS CAPS    Take by mouth       ALLERGIES     is allergic to codeine, tylenol with codeine #3 [acetaminophen-codeine], vicodin [hydrocodone-acetaminophen], ace inhibitors, aspirin, azulfidine [sulfasalazine], roberta-d [diphenhydramine], elmiron [pentosan polysulfate sodium], fentanyl, medrol [methylprednisolone], morphine, other, reglan [metoclopramide], tramadol, and phenergan [promethazine]. FAMILY HISTORY     She indicated that her mother is . She indicated that her father is . She indicated that the status of her other is unknown. She indicated that the status of her neg hx is unknown.     family history includes COPD in her mother; Cirrhosis in an other family member; Diabetes in her father; Heart Disease in her mother. SOCIAL HISTORY      reports that she quit smoking about 46 years ago. She has a 1.60 pack-year smoking history. She has never used smokeless tobacco. She reports that she does not drink alcohol and does not use drugs. PHYSICAL EXAM     INITIAL VITALS:  height is 5' 9\" (1.753 m) and weight is 163 lb (73.9 kg). Her blood pressure is 138/77 and her pulse is 92. Her respiration is 16 and oxygen saturation is 96%.       General: Patient alert nontoxic-appearing female in no apparent distress  HEENT: Head is atraumatic conjunctiva are clear pupils are equal reactive mouth shows moist mucous membranes  Neck: Supple  Respiratory: Lung sounds are clear bilateral  Cardiac: Heart is regular rate and rhythm  GI: Abdomen soft mildly diffusely tender with no rebound guarding pulsatile mass or bruits bowel sounds are normal  Neuro: Patient has no gross focal neurological deficits at bedside exam    DIFFERENTIAL DIAGNOSIS/ MDM:     Obtain Baseline abdominal labs    DIAGNOSTIC RESULTS     EKG: All EKG's are interpreted by the Emergency Department Physician who either signs or Co-signs this chart in the absence of a cardiologist.        RADIOLOGY:   I directly visualized the following  images and reviewed the radiologist interpretations:  No orders to display         LABS:  Labs Reviewed   CBC WITH AUTO DIFFERENTIAL - Abnormal; Notable for the following components:       Result Value    RBC 3.68 (*)     Hemoglobin 9.9 (*)     Hematocrit 31.2 (*)     Monocytes 3 (*)     Lymphocytes 5 (*)     Seg Neutrophils 92 (*)     Eosinophils % 0 (*)     Absolute Lymph # 0.34 (*)     All other components within normal limits   HEPATIC FUNCTION PANEL - Abnormal; Notable for the following components:     Total Bilirubin 0.25 (*)     All other components within normal limits   URINALYSIS WITH REFLEX TO CULTURE - Abnormal; Notable for the following components:    Urine Hgb TRACE (*)     Nitrite, Urine POSITIVE (*)     Leukocyte Esterase, Urine 3+ (*)     All other components within normal limits   MICROSCOPIC URINALYSIS - Abnormal; Notable for the following components:    Bacteria, UA 4+ (*)     Other Observations UA Specimen Cultured (*)     All other components within normal limits   CULTURE, URINE   LIPASE         EMERGENCY DEPARTMENT COURSE:   Vitals:    Vitals:    06/02/22 1911 06/02/22 1918   BP: (!) 162/136 138/77   Pulse: 92    Resp: 16    SpO2: 96%    Weight: 163 lb (73.9 kg)    Height: 5' 9\" (1.753 m)      -------------------------  BP: 138/77,  , Heart Rate: 92, Resp: 16    Orders Placed This Encounter   Medications    ondansetron (ZOFRAN-ODT) disintegrating tablet 4 mg    HYDROmorphone (DILAUDID) injection 0.5 mg    nitrofurantoin (macrocrystal-monohydrate) (MACROBID) capsule 100 mg     Order Specific Question:   Antimicrobial Indications     Answer:   Urinary

## 2022-06-04 LAB
CULTURE: ABNORMAL
SPECIMEN DESCRIPTION: ABNORMAL

## 2022-08-10 ENCOUNTER — OFFICE VISIT (OUTPATIENT)
Dept: CARDIOLOGY | Age: 67
End: 2022-08-10
Payer: COMMERCIAL

## 2022-08-10 VITALS
WEIGHT: 166 LBS | DIASTOLIC BLOOD PRESSURE: 80 MMHG | HEIGHT: 69 IN | BODY MASS INDEX: 24.59 KG/M2 | SYSTOLIC BLOOD PRESSURE: 130 MMHG | HEART RATE: 79 BPM

## 2022-08-10 DIAGNOSIS — I48.91 ATRIAL FIBRILLATION, UNSPECIFIED TYPE (HCC): Primary | ICD-10-CM

## 2022-08-10 PROCEDURE — 93000 ELECTROCARDIOGRAM COMPLETE: CPT | Performed by: INTERNAL MEDICINE

## 2022-08-10 PROCEDURE — 99214 OFFICE O/P EST MOD 30 MIN: CPT | Performed by: INTERNAL MEDICINE

## 2022-08-10 RX ORDER — AMITRIPTYLINE HYDROCHLORIDE 25 MG/1
25 TABLET, FILM COATED ORAL NIGHTLY
COMMUNITY

## 2022-08-10 RX ORDER — CYCLOBENZAPRINE HCL 10 MG
10 TABLET ORAL 3 TIMES DAILY PRN
COMMUNITY

## 2022-08-10 NOTE — PROGRESS NOTES
Today's Date: 8/10/2022  Patient's Name: Ro Wheatley  Patient's age: 61 y.o., 1955    Subjective:  Ro Wheatley is being seen in clinic today regarding h/o afib    she is here for follow up. She denies any chest pain or dyspnea. No PND, no syncope or pre-syncope, no orthopnea. Reports short lasting occasional episodes of palpitations/skipped heart beat. She is scheduled for incisional hernia repair at [facility]. 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 Past Surgical History:   has a past surgical history that includes Colon surgery; Breast surgery; liver biopsy; Liver transplant (2004); Abdominal hernia repair (2005); ventral hernia repair (2008); hernia repair (2014, revision );  section; Colonoscopy; Upper gastrointestinal endoscopy; Cholecystectomy; Appendectomy; Atrial ablation surgery (, 12/10/14); eye surgery; Breast biopsy (Left, 2014); retinal laser (Bilateral); Cataract removal with implant (Right, 2017);  Cataract removal with implant (Left, 2017); pr xcapsl ctrc rmvl insj io lens prosth w/o ecp (Right, 2017); pr xcapsl ctrc rmvl insj io lens prosth w/o ecp (Left, 2017); and Kidney Historical Provider, MD   ondansetron (ZOFRAN ODT) 4 MG disintegrating tablet Take 1 tablet by mouth every 8 hours as needed for Nausea 2/28/17  Yes Erik Lopez,    glucose blood VI test strips (ASCENSIA AUTODISC VI;ONE TOUCH ULTRA TEST VI) strip One touch ultra test strips. Test up to 4 times a day. 5/23/16  Yes Evi Avina MD   PROGRAF 1 MG capsule Take 1 mg by mouth Take 2 tabs in am and Take 2 tabs in pm 6/26/14  Yes Historical Provider, MD       Allergies:  Codeine, Tylenol with codeine #3 [acetaminophen-codeine], Vicodin [hydrocodone-acetaminophen], Ace inhibitors, Aspirin, Azulfidine [sulfasalazine], Laura-d [diphenhydramine], Elmiron [pentosan polysulfate sodium], Fentanyl, Medrol [methylprednisolone], Morphine, Other, Reglan [metoclopramide], Tramadol, and Phenergan [promethazine]    Social History:   reports that she quit smoking about 46 years ago. Her smoking use included cigarettes. She has a 1.60 pack-year smoking history. She has never used smokeless tobacco. She reports that she does not drink alcohol and does not use drugs. Family History: family history includes COPD in her mother; Cirrhosis in an other family member; Diabetes in her father; Heart Disease in her mother. No h/o sudden cardiac death. No for premature CAD    REVIEW OF SYSTEMS:    Constitutional: there has been no unanticipated weight loss. There's been No change in energy level, No change in activity level. Eyes: No visual changes or diplopia. No scleral icterus. ENT: No Headaches, hearing loss or vertigo. No mouth sores or sore throat. Cardiovascular: see above  Respiratory: see above  Gastrointestinal: No abdominal pain, appetite loss, blood in stools. Genitourinary: No dysuria, trouble voiding, or hematuria. Musculoskeletal:  No gait disturbance, No weakness or joint complaints. Integumentary: +easy bruising   Neurological: No headache or diplopia.  No tingling  Psychiatric: No anxiety, or depression. Endocrine: No temperature intolerance. Hematologic/Lymphatic: No abnormal bruising or bleeding, blood clots or swollen lymph nodes. Allergic/Immunologic: No nasal congestion or hives. PHYSICAL EXAM:      /80   Pulse 79   Ht 5' 9\" (1.753 m)   Wt 166 lb (75.3 kg)   BMI 24.51 kg/m²    Constitutional and General Appearance: alert, cooperative, no distress and appears stated age  [de-identified]: PERRL, no cervical lymphadenopathy. No masses palpable. Normal oral mucosa  Respiratory:  Normal excursion and expansion without use of accessory muscles  Resp Auscultation: Good respiratory effort. No for increased work of breathing. On auscultation: clear to auscultation bilaterally  Cardiovascular:  Heart tones are crisp and normal. regular S1 and S2.  Jugular venous pulsation Normal  The carotid upstroke is normal in amplitude and contour without delay or bruit   Abdomen:   soft  Bowel sounds present  Extremities:   No edema  Neurological:  Alert and oriented. Cardiac Data:  EKG 8/10/2022: NSR, NL ECG     Labs:     CBC: No results for input(s): WBC, HGB, HCT, PLT in the last 72 hours. BMP: No results for input(s): NA, K, CO2, BUN, CREATININE, LABGLOM, GLUCOSE in the last 72 hours. PT/INR: No results for input(s): PROTIME, INR in the last 72 hours. FASTING LIPID PANEL:  Lab Results   Component Value Date/Time    HDL 76 11/19/2018 10:05 AM    LDLCALC 61 03/17/2017 12:00 AM    TRIG 91 11/19/2018 10:05 AM     LIVER PROFILE:No results for input(s): AST, ALT, LABALBU in the last 72 hours.     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 Nuclear stress test 7/19/21  IMPRESSION:  1. No fixed or reversible perfusion defect to suggest infarct or ischemia. 2. Normal left ventricular ejection fraction. 3. Low risk study. Assessment and plan:     - Atypical Chest pressure- evaluated by negative stress test 7/19/21. Stable. No chest pain reported. -H/o atrial fibrillation with ablation 12/10/2014. She is allergic to ASA. Stable and asymptomatic.  -Mild to moderate mitral regurgitation on TTE 7/19/21- Stable. -DM- on insulin for 12 year.  -Hypothyroidism on replacement. -GERD- On omeprazole and famotidine  -Depression- on zoloft. 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 This was her second liver transplant. First transplant lasted 15 years. Underlying diagnosis is primary sclerosing cholangitis. On prograf and Cellcept  -Tremors from transplant medications. -RTC 6 months.       Susan Casiano [id number]  [phone]

## 2023-05-04 ENCOUNTER — OFFICE VISIT (OUTPATIENT)
Dept: PAIN MANAGEMENT | Age: 68
End: 2023-05-04
Payer: COMMERCIAL

## 2023-05-04 VITALS
HEART RATE: 84 BPM | WEIGHT: 161 LBS | SYSTOLIC BLOOD PRESSURE: 116 MMHG | RESPIRATION RATE: 16 BRPM | OXYGEN SATURATION: 99 % | DIASTOLIC BLOOD PRESSURE: 68 MMHG | BODY MASS INDEX: 23.78 KG/M2

## 2023-05-04 DIAGNOSIS — G89.29 CHRONIC ABDOMINAL PAIN: Primary | ICD-10-CM

## 2023-05-04 DIAGNOSIS — R10.9 CHRONIC ABDOMINAL PAIN: Primary | ICD-10-CM

## 2023-05-04 DIAGNOSIS — G62.89 OTHER POLYNEUROPATHY: ICD-10-CM

## 2023-05-04 PROCEDURE — 99214 OFFICE O/P EST MOD 30 MIN: CPT | Performed by: NURSE PRACTITIONER

## 2023-05-04 PROCEDURE — 3078F DIAST BP <80 MM HG: CPT | Performed by: NURSE PRACTITIONER

## 2023-05-04 PROCEDURE — 99203 OFFICE O/P NEW LOW 30 MIN: CPT | Performed by: NURSE PRACTITIONER

## 2023-05-04 PROCEDURE — 3074F SYST BP LT 130 MM HG: CPT | Performed by: NURSE PRACTITIONER

## 2023-05-04 PROCEDURE — 1123F ACP DISCUSS/DSCN MKR DOCD: CPT | Performed by: NURSE PRACTITIONER

## 2023-05-04 RX ORDER — VANCOMYCIN HYDROCHLORIDE 250 MG/5ML
POWDER, FOR SOLUTION ORAL
COMMUNITY
Start: 2023-04-17

## 2023-05-04 RX ORDER — HYDROMORPHONE HYDROCHLORIDE 2 MG/1
2 TABLET ORAL EVERY 12 HOURS PRN
Qty: 60 TABLET | Refills: 0 | Status: SHIPPED | OUTPATIENT
Start: 2023-05-04 | End: 2023-06-03

## 2023-05-04 ASSESSMENT — PATIENT HEALTH QUESTIONNAIRE - PHQ9
1. LITTLE INTEREST OR PLEASURE IN DOING THINGS: 0
2. FEELING DOWN, DEPRESSED OR HOPELESS: 0
SUM OF ALL RESPONSES TO PHQ QUESTIONS 1-9: 0
SUM OF ALL RESPONSES TO PHQ9 QUESTIONS 1 & 2: 0
SUM OF ALL RESPONSES TO PHQ QUESTIONS 1-9: 0

## 2023-05-04 ASSESSMENT — ENCOUNTER SYMPTOMS
EYES NEGATIVE: 1
ABDOMINAL PAIN: 1
RESPIRATORY NEGATIVE: 1

## 2023-05-04 NOTE — PROGRESS NOTES
Detail Level: Detailed
verbal subscore is 5. GCS motor subscore is 6. Cranial Nerves: No cranial nerve deficit. Psychiatric:         Attention and Perception: Attention and perception normal.         Mood and Affect: Mood and affect normal.         Speech: Speech normal.         Behavior: Behavior is cooperative. Cognition and Memory: Cognition normal.         Judgment: Judgment normal.       Assessment / Plan:          Diagnosis Orders   1. Chronic abdominal pain  HYDROmorphone (DILAUDID) 2 MG tablet      2. Other polyneuropathy            Dilaudid 2mg bid prn pain  Controlled Substance Monitoring:    Acute and Chronic Pain Monitoring:   RX Monitoring 5/4/2023   Attestation -   Periodic Controlled Substance Monitoring No signs of potential drug abuse or diversion identified.    Chronic Pain > 80 MEDD -

## 2023-06-21 ENCOUNTER — HOSPITAL ENCOUNTER (OUTPATIENT)
Age: 68
Setting detail: SPECIMEN
Discharge: HOME | End: 2023-06-21
Payer: COMMERCIAL

## 2023-06-21 ENCOUNTER — OFFICE VISIT (OUTPATIENT)
Dept: PAIN MANAGEMENT | Age: 68
End: 2023-06-21
Payer: COMMERCIAL

## 2023-06-21 VITALS
HEIGHT: 69 IN | WEIGHT: 158 LBS | SYSTOLIC BLOOD PRESSURE: 120 MMHG | BODY MASS INDEX: 23.4 KG/M2 | DIASTOLIC BLOOD PRESSURE: 70 MMHG

## 2023-06-21 DIAGNOSIS — M54.12 CERVICAL RADICULOPATHY: ICD-10-CM

## 2023-06-21 DIAGNOSIS — R10.9 CHRONIC ABDOMINAL PAIN: ICD-10-CM

## 2023-06-21 DIAGNOSIS — Z79.891 ENCOUNTER FOR LONG-TERM OPIATE ANALGESIC USE: ICD-10-CM

## 2023-06-21 DIAGNOSIS — G89.29 CHRONIC ABDOMINAL PAIN: ICD-10-CM

## 2023-06-21 DIAGNOSIS — M50.20 HERNIATED CERVICAL DISC: ICD-10-CM

## 2023-06-21 DIAGNOSIS — Z02.83 ENCOUNTER FOR DRUG SCREENING: Primary | ICD-10-CM

## 2023-06-21 DIAGNOSIS — G62.9 PERIPHERAL POLYNEUROPATHY: ICD-10-CM

## 2023-06-21 DIAGNOSIS — N18.4 CHRONIC KIDNEY DISEASE, STAGE 4 (SEVERE) (HCC): ICD-10-CM

## 2023-06-21 DIAGNOSIS — Z02.83 ENCOUNTER FOR DRUG SCREENING: ICD-10-CM

## 2023-06-21 PROCEDURE — 80307 DRUG TEST PRSMV CHEM ANLYZR: CPT

## 2023-06-21 PROCEDURE — 3074F SYST BP LT 130 MM HG: CPT | Performed by: NURSE PRACTITIONER

## 2023-06-21 PROCEDURE — 99214 OFFICE O/P EST MOD 30 MIN: CPT | Performed by: NURSE PRACTITIONER

## 2023-06-21 PROCEDURE — 1123F ACP DISCUSS/DSCN MKR DOCD: CPT | Performed by: NURSE PRACTITIONER

## 2023-06-21 PROCEDURE — G0481 DRUG TEST DEF 8-14 CLASSES: HCPCS

## 2023-06-21 PROCEDURE — 3078F DIAST BP <80 MM HG: CPT | Performed by: NURSE PRACTITIONER

## 2023-06-21 RX ORDER — HYDROMORPHONE HYDROCHLORIDE 2 MG/1
2 TABLET ORAL EVERY 12 HOURS PRN
Qty: 60 TABLET | Refills: 0 | Status: SHIPPED | OUTPATIENT
Start: 2023-06-21 | End: 2023-07-21

## 2023-06-21 ASSESSMENT — ENCOUNTER SYMPTOMS
ABDOMINAL PAIN: 1
EYES NEGATIVE: 1
RESPIRATORY NEGATIVE: 1

## 2023-06-21 NOTE — PROGRESS NOTES
Subjective:      Patient ID: Nia De Los Santos is a 74 y.o. female. Chief Complaint   Patient presents with    Abdominal Pain     1 mo f/u ABD pain. 1 Month Follow-Up       Abdominal Pain   Here today for routine pain clinic recheck. Chronic pain, managed  on dilaudid. Has underwent liver transplant since last visit    Pain Assessment  Location of Pain: Other (Comment)  Location Modifiers: Superior, Inferior, Medial (abdominal)  Severity of Pain: 6 (can be as high as #10.)  Quality of Pain: Throbbing, Sharp, Dull, Aching, Other (Comment) (cramp)  Duration of Pain: Persistent  Frequency of Pain: Intermittent  Aggravating Factors: Bending, Other (Comment) (some foods.)  Limiting Behavior: Yes  Relieving Factors: Other (Comment) (medication)    Allergies   Allergen Reactions    Codeine Shortness Of Breath    Tylenol With Codeine #3 [Acetaminophen-Codeine] Shortness Of Breath    Vicodin [Hydrocodone-Acetaminophen] Shortness Of Breath     Makes her feel crazy    Ace Inhibitors Other (See Comments)     Cough    Aspirin      Makes her heart race      Azulfidine [Sulfasalazine] Hives    [place]-D [Diphenhydramine] Other (See Comments)    Erythromycin      pt has poor renal function and was told not to use the mycin antibiotic group    Fentanyl      Injection only    Medrol [Methylprednisolone]      Tolerates prednisone okay    Makes her stomach hurt. She also stated that Prednisone makes her sick. Morphine Hives    Other      Mycins (not to have per liver transplant team)    Pentosan Polysulfate Sodium Other (See Comments)     bruising  Other reaction(s):  Other (See Comments)  bruising  bruising    Reglan [Metoclopramide]      Medical induced parkinsons    Tramadol      Makes her feel crazy    Phenergan [Promethazine]      Restless leg       Outpatient Medications Marked as Taking for the 6/21/23 encounter (Office Visit) with HALINA Soto CNP   Medication Sig Dispense Refill    HYDROmorphone (DILAUDID) 2

## 2023-06-21 NOTE — PROGRESS NOTES
Urine sample collected with patient verifying name and  date of birth on label affixed to container. Oral Minoxidil Counseling- I discussed with the patient the risks of oral minoxidil including but not limited to shortness of breath, swelling of the feet or ankles, dizziness, lightheadedness, unwanted hair growth and allergic reaction.  The patient verbalized understanding of the proper use and possible adverse effects of oral minoxidil.  All of the patient's questions and concerns were addressed.

## 2023-06-23 LAB
6-ACETYLMORPHINE, UR: NOT DETECTED
7-AMINOCLONAZEPAM, URINE: PRESENT
ALPHA-OH-ALPRAZ, URINE: NOT DETECTED
ALPHA-OH-MIDAZOLAM, URINE: NOT DETECTED
ALPRAZOLAM, URINE: NOT DETECTED
AMPHETAMINES, URINE: NOT DETECTED
BARBITURATES, URINE: NOT DETECTED
BENZOYLECGONINE, UR: NOT DETECTED
BUPRENORPHINE URINE: NOT DETECTED
CARISOPRODOL, UR: NOT DETECTED
CLONAZEPAM, URINE: NOT DETECTED
CODEINE, URINE: NOT DETECTED
CREATININE URINE: 128.3 MG/DL (ref 20–400)
DIAZEPAM, URINE: NOT DETECTED
EER PAIN MGT DRUG PANEL, HIGH RES/EMIT U: NORMAL
ETHYL GLUCURONIDE UR: NOT DETECTED
FENTANYL URINE: NOT DETECTED
GABAPENTIN: PRESENT
HYDROCODONE, URINE: NOT DETECTED
HYDROMORPHONE, URINE: PRESENT
LORAZEPAM, URINE: NOT DETECTED
MARIJUANA METAB, UR: NOT DETECTED
MDA, UR: NOT DETECTED
MDEA, EVE, UR: NOT DETECTED
MDMA URINE: NOT DETECTED
MEPERIDINE METAB, UR: NOT DETECTED
METHADONE, URINE: NOT DETECTED
METHAMPHETAMINE, URINE: NOT DETECTED
METHYLPHENIDATE: NOT DETECTED
MIDAZOLAM, URINE: NOT DETECTED
MORPHINE URINE: NOT DETECTED
NALOXONE URINE: NOT DETECTED
NORBUPRENORPHINE, URINE: NOT DETECTED
NORDIAZEPAM, URINE: NOT DETECTED
NORFENTANYL, URINE: NOT DETECTED
NORHYDROCODONE, URINE: NOT DETECTED
NOROXYCODONE, URINE: NOT DETECTED
NOROXYMORPHONE, URINE: NOT DETECTED
OXAZEPAM, URINE: NOT DETECTED
OXYCODONE URINE: NOT DETECTED
OXYMORPHONE, URINE: NOT DETECTED
PAIN MGT DRUG PANEL, HI RES, UR: NORMAL
PCP,URINE: NOT DETECTED
PHENTERMINE, UR: NOT DETECTED
PREGABALIN: NOT DETECTED
TAPENTADOL, URINE: NOT DETECTED
TAPENTADOL-O-SULFATE, URINE: NOT DETECTED
TEMAZEPAM, URINE: NOT DETECTED
TRAMADOL, URINE: NOT DETECTED
ZOLPIDEM METABOLITE (ZCA), URINE: NOT DETECTED
ZOLPIDEM, URINE: NOT DETECTED

## 2023-08-16 DIAGNOSIS — G89.29 CHRONIC ABDOMINAL PAIN: ICD-10-CM

## 2023-08-16 DIAGNOSIS — R10.9 CHRONIC ABDOMINAL PAIN: ICD-10-CM

## 2023-08-16 RX ORDER — HYDROMORPHONE HYDROCHLORIDE 2 MG/1
2 TABLET ORAL EVERY 12 HOURS PRN
Qty: 60 TABLET | Refills: 0 | Status: SHIPPED | OUTPATIENT
Start: 2023-08-16 | End: 2023-09-15

## 2023-08-16 NOTE — TELEPHONE ENCOUNTER
Keith Szymanski called requesting a refill of the below medication which has been pended for you:     Requested Prescriptions     Pending Prescriptions Disp Refills    HYDROmorphone (DILAUDID) 2 MG tablet 60 tablet 0     Sig: Take 1 tablet by mouth every 12 hours as needed for Pain for up to 30 days. Max Daily Amount: 4 mg       Last Appointment Date: 6/21/2023  Next Appointment Date: 9/21/2023    Allergies   Allergen Reactions    Codeine Shortness Of Breath    Tylenol With Codeine #3 [Acetaminophen-Codeine] Shortness Of Breath    Vicodin [Hydrocodone-Acetaminophen] Shortness Of Breath     Makes her feel crazy    Ace Inhibitors Other (See Comments)     Cough    Aspirin      Makes her heart race      Azulfidine [Sulfasalazine] Hives    [place]-D [Diphenhydramine] Other (See Comments)    Erythromycin      pt has poor renal function and was told not to use the mycin antibiotic group    Fentanyl      Injection only    Medrol [Methylprednisolone]      Tolerates prednisone okay    Makes her stomach hurt. She also stated that Prednisone makes her sick. Morphine Hives    Other      Mycins (not to have per liver transplant team)    Pentosan Polysulfate Sodium Other (See Comments)     bruising  Other reaction(s): Other (See Comments)  bruising  bruising    Reglan [Metoclopramide]      Medical induced parkinsons    Tramadol      Makes her feel crazy    Phenergan [Promethazine]      Restless leg       [facility]arm[facility]:  [facility]    Last [facility] 6/21/23. Please review. OARRS Report checked for [place], [place], and [place]:   dilaudid 2 mg  6/21/23   #60. Due now.

## 2023-09-21 ENCOUNTER — OFFICE VISIT (OUTPATIENT)
Dept: PAIN MANAGEMENT | Age: 68
End: 2023-09-21
Payer: COMMERCIAL

## 2023-09-21 VITALS
WEIGHT: 154 LBS | BODY MASS INDEX: 22.81 KG/M2 | OXYGEN SATURATION: 99 % | RESPIRATION RATE: 17 BRPM | SYSTOLIC BLOOD PRESSURE: 123 MMHG | HEIGHT: 69 IN | DIASTOLIC BLOOD PRESSURE: 65 MMHG | HEART RATE: 77 BPM

## 2023-09-21 DIAGNOSIS — G89.29 CHRONIC ABDOMINAL PAIN: ICD-10-CM

## 2023-09-21 DIAGNOSIS — R10.9 CHRONIC ABDOMINAL PAIN: ICD-10-CM

## 2023-09-21 DIAGNOSIS — M54.12 CERVICAL RADICULOPATHY: Primary | ICD-10-CM

## 2023-09-21 DIAGNOSIS — G62.89 OTHER POLYNEUROPATHY: ICD-10-CM

## 2023-09-21 PROCEDURE — 1123F ACP DISCUSS/DSCN MKR DOCD: CPT | Performed by: NURSE PRACTITIONER

## 2023-09-21 PROCEDURE — 3074F SYST BP LT 130 MM HG: CPT | Performed by: NURSE PRACTITIONER

## 2023-09-21 PROCEDURE — 3078F DIAST BP <80 MM HG: CPT | Performed by: NURSE PRACTITIONER

## 2023-09-21 PROCEDURE — 99214 OFFICE O/P EST MOD 30 MIN: CPT | Performed by: NURSE PRACTITIONER

## 2023-09-21 RX ORDER — HYDROMORPHONE HYDROCHLORIDE 2 MG/1
2 TABLET ORAL EVERY 12 HOURS PRN
Qty: 60 TABLET | Refills: 0 | Status: SHIPPED | OUTPATIENT
Start: 2023-09-21 | End: 2023-10-21

## 2023-09-21 RX ORDER — CLONAZEPAM 0.5 MG/1
0.5 TABLET ORAL 3 TIMES DAILY
COMMUNITY
Start: 2023-08-07

## 2023-09-21 RX ORDER — VANCOMYCIN HYDROCHLORIDE 125 MG/1
125 CAPSULE ORAL 4 TIMES DAILY
COMMUNITY
Start: 2023-07-02

## 2023-09-21 RX ORDER — PEN NEEDLE, DIABETIC 32GX 5/32"
NEEDLE, DISPOSABLE MISCELLANEOUS
COMMUNITY
Start: 2023-08-14

## 2023-09-21 ASSESSMENT — ENCOUNTER SYMPTOMS
RESPIRATORY NEGATIVE: 1
ABDOMINAL PAIN: 1
EYES NEGATIVE: 1

## 2023-09-21 NOTE — PROGRESS NOTES
APPENDECTOMY      ATRIAL ABLATION SURGERY  , 12/10/14    had it done twice. BREAST BIOPSY Left 2014    BREAST SURGERY      Biopsy x2    CATARACT REMOVAL WITH IMPLANT Right 2017    [facility]    CATARACT REMOVAL WITH IMPLANT Left 2017    [facility]     SECTION      three times    CHOLECYSTECTOMY      COLON SURGERY      total colectomy and colostomy with reversal    COLONOSCOPY      EYE SURGERY      laser done on both eyes    HERNIA REPAIR  2014, revision     Dr Chelsie Carbone. Clinic. Also took done adhesions and removed old sutures. Reexploration     KIDNEY TRANSPLANT      LIVER BIOPSY      LIVER TRANSPLANT  2004    [facility]    MO XCAPSL CTRC RMVL INSJ IO LENS PROSTH W/O ECP Right 2017    Right Phaco w/ IOL  performed by Kim Syed MD at [facility] W/O ECP Left 2017    Left Phaco w/ IOL topical performed by Kim Syed MD at [address] Bilateral     [facility]    UPPER GASTROINTESTINAL ENDOSCOPY      VENTRAL HERNIA REPAIR  2008    Recurrent repair, plastic surgery, [facility]. Also with extensive lysis of adhesions.        Family History   Problem Relation Age of Onset    Heart Disease Mother     COPD Mother     Diabetes Father     Cirrhosis Other         related to drinking    Cataracts Neg Hx     Glaucoma Neg Hx        Social History     Socioeconomic History    Marital status:      Spouse name: alex    Number of children: 2    Years of education: None    Highest education level: None   Occupational History    Occupation: unemployed   Tobacco Use    Smoking status: Former     Packs/day: 0.80     Years: 2.00     Additional pack years: 0.00     Total pack years: 1.60     Types: Cigarettes     Quit date: 1976     Years since quittin.7    Smokeless tobacco: Never   Substance and Sexual Activity    Alcohol use: No    Drug

## 2023-11-20 ENCOUNTER — OFFICE VISIT (OUTPATIENT)
Dept: PAIN MANAGEMENT | Age: 68
End: 2023-11-20
Payer: COMMERCIAL

## 2023-11-20 VITALS — OXYGEN SATURATION: 97 % | BODY MASS INDEX: 22.89 KG/M2 | WEIGHT: 155 LBS | HEART RATE: 80 BPM

## 2023-11-20 DIAGNOSIS — M50.20 HERNIATED CERVICAL DISC: Primary | ICD-10-CM

## 2023-11-20 DIAGNOSIS — G89.29 CHRONIC ABDOMINAL PAIN: ICD-10-CM

## 2023-11-20 DIAGNOSIS — M54.12 CERVICAL RADICULOPATHY: ICD-10-CM

## 2023-11-20 DIAGNOSIS — R10.9 CHRONIC ABDOMINAL PAIN: ICD-10-CM

## 2023-11-20 DIAGNOSIS — N18.4 CHRONIC KIDNEY DISEASE, STAGE 4 (SEVERE) (HCC): ICD-10-CM

## 2023-11-20 PROCEDURE — 1123F ACP DISCUSS/DSCN MKR DOCD: CPT | Performed by: NURSE PRACTITIONER

## 2023-11-20 PROCEDURE — 99214 OFFICE O/P EST MOD 30 MIN: CPT | Performed by: NURSE PRACTITIONER

## 2023-11-20 RX ORDER — HYDROMORPHONE HYDROCHLORIDE 2 MG/1
2 TABLET ORAL EVERY 12 HOURS PRN
Qty: 60 TABLET | Refills: 0 | Status: SHIPPED | OUTPATIENT
Start: 2023-11-20 | End: 2023-12-20

## 2023-11-20 RX ORDER — GABAPENTIN 800 MG/1
400 TABLET ORAL 2 TIMES DAILY
COMMUNITY
Start: 2023-10-20

## 2023-11-20 ASSESSMENT — ENCOUNTER SYMPTOMS
ABDOMINAL DISTENTION: 1
DIARRHEA: 1
VOMITING: 0
NAUSEA: 0
COUGH: 1
ABDOMINAL PAIN: 1
BACK PAIN: 1
CONSTIPATION: 0
SHORTNESS OF BREATH: 0
EYES NEGATIVE: 1

## 2023-11-20 NOTE — PROGRESS NOTES
clonazePAM (KLONOPIN) 0.5 mg, Oral, 3 TIMES DAILY    DROPLET PEN NEEDLES 32G X 4 MM MISC use 1 PEN NEEDLE to inject MEDICATION subcutaneously five times a day    gabapentin (NEURONTIN) 400 mg, Oral, 2 TIMES DAILY    gabapentin (NEURONTIN) 400 mg, Oral, 2 TIMES DAILY    glucose blood VI test strips (ASCENSIA AUTODISC VI;ONE TOUCH ULTRA TEST VI) strip One touch ultra test strips. Test up to 4 times a day. HUMALOG KWIKPEN 100 UNIT/ML SOPN inject INSULIN every morning and at bedtime PER SLIDING SCALE:151...  (REFER TO PRESCRIPTION NOTES).     HUMULIN N KWIKPEN 100 UNIT/ML injection pen INJECT 14 UNITS SUBCUTANEOUSLY ONCE DAILY    HYDROmorphone (DILAUDID) 2 mg, Oral, EVERY 12 HOURS PRN    levothyroxine (SYNTHROID) 137 MCG tablet Oral, DAILY, Take one tab daily (137 mcg)    metoprolol tartrate (LOPRESSOR) 25 MG tablet take 1/2 tablet by mouth twice a day    Multiple Vitamins-Iron (MULTIVITAMIN/IRON PO) Oral, DAILY    mycophenolate (CELLCEPT) 250 MG capsule Indications: Taking 3 tabs BID    ondansetron (ZOFRAN ODT) 4 mg, Oral, EVERY 8 HOURS PRN    pantoprazole (PROTONIX) 40 mg, Oral, DAILY    predniSONE (DELTASONE) 10 mg, Oral, DAILY    Prograf 1 mg, Oral, Take 2 tabs in am and Take 2 tabs in pm    sertraline (ZOLOFT) 100 mg, Oral, DAILY    sulfamethoxazole-trimethoprim (BACTRIM DS;SEPTRA DS) 800-160 MG per tablet TAKE ONE-HALF (1/2) TABLET 5 DAYS A WEEK ( MONDAY THROUGH FRIDAY )    Vitamin D, Cholecalciferol, 400 units CAPS Oral       Allergies   Allergen Reactions    Acetaminophen-Codeine     Codeine Shortness Of Breath    Tylenol With Codeine #3 [Acetaminophen-Codeine] Shortness Of Breath    Vicodin [Hydrocodone-Acetaminophen] Shortness Of Breath     Makes her feel crazy    Ace Inhibitors Other (See Comments)     Cough    Aspirin      Makes her heart race      Azulfidine [Sulfasalazine] Hives    [place]-D [Diphenhydramine] Other (See Comments)    Erythromycin      pt has poor renal function and was told not to use the

## 2024-01-17 DIAGNOSIS — G89.29 CHRONIC ABDOMINAL PAIN: ICD-10-CM

## 2024-01-17 DIAGNOSIS — R10.9 CHRONIC ABDOMINAL PAIN: ICD-10-CM

## 2024-01-17 NOTE — TELEPHONE ENCOUNTER
Quan refills to [facility] requested.   Pt is admitted to hospital now per person calling on voice mail.      I will hold this request to check pt status first.

## 2024-01-22 RX ORDER — HYDROMORPHONE HYDROCHLORIDE 2 MG/1
2 TABLET ORAL EVERY 12 HOURS PRN
Qty: 60 TABLET | Refills: 0 | Status: SHIPPED | OUTPATIENT
Start: 2024-01-22 | End: 2024-02-21

## 2024-02-02 ENCOUNTER — OFFICE VISIT (OUTPATIENT)
Dept: PAIN MANAGEMENT | Age: 69
End: 2024-02-02
Payer: COMMERCIAL

## 2024-02-02 ENCOUNTER — HOSPITAL ENCOUNTER (OUTPATIENT)
Age: 69
Setting detail: SPECIMEN
Discharge: HOME | End: 2024-02-02
Payer: COMMERCIAL

## 2024-02-02 VITALS
DIASTOLIC BLOOD PRESSURE: 66 MMHG | HEIGHT: 69 IN | WEIGHT: 152 LBS | SYSTOLIC BLOOD PRESSURE: 110 MMHG | HEART RATE: 66 BPM | BODY MASS INDEX: 22.51 KG/M2 | OXYGEN SATURATION: 99 %

## 2024-02-02 DIAGNOSIS — Z79.891 ENCOUNTER FOR LONG-TERM OPIATE ANALGESIC USE: ICD-10-CM

## 2024-02-02 DIAGNOSIS — G89.29 CHRONIC ABDOMINAL PAIN: ICD-10-CM

## 2024-02-02 DIAGNOSIS — Z02.83 ENCOUNTER FOR DRUG SCREENING: ICD-10-CM

## 2024-02-02 DIAGNOSIS — M50.20 HERNIATED CERVICAL DISC: ICD-10-CM

## 2024-02-02 DIAGNOSIS — G89.29 CHRONIC ABDOMINAL PAIN: Primary | ICD-10-CM

## 2024-02-02 DIAGNOSIS — G62.89 OTHER POLYNEUROPATHY: ICD-10-CM

## 2024-02-02 DIAGNOSIS — M54.12 CERVICAL RADICULOPATHY: ICD-10-CM

## 2024-02-02 DIAGNOSIS — R10.9 CHRONIC ABDOMINAL PAIN: Primary | ICD-10-CM

## 2024-02-02 DIAGNOSIS — R10.9 CHRONIC ABDOMINAL PAIN: ICD-10-CM

## 2024-02-02 PROCEDURE — 80307 DRUG TEST PRSMV CHEM ANLYZR: CPT

## 2024-02-02 PROCEDURE — 99214 OFFICE O/P EST MOD 30 MIN: CPT | Performed by: NURSE PRACTITIONER

## 2024-02-02 PROCEDURE — 3078F DIAST BP <80 MM HG: CPT | Performed by: NURSE PRACTITIONER

## 2024-02-02 PROCEDURE — 1123F ACP DISCUSS/DSCN MKR DOCD: CPT | Performed by: NURSE PRACTITIONER

## 2024-02-02 PROCEDURE — G0481 DRUG TEST DEF 8-14 CLASSES: HCPCS

## 2024-02-02 PROCEDURE — 3074F SYST BP LT 130 MM HG: CPT | Performed by: NURSE PRACTITIONER

## 2024-02-02 RX ORDER — HYDROMORPHONE HYDROCHLORIDE 2 MG/1
2 TABLET ORAL EVERY 12 HOURS PRN
Qty: 90 TABLET | Refills: 0 | Status: SHIPPED | OUTPATIENT
Start: 2024-02-02 | End: 2024-03-03

## 2024-02-02 ASSESSMENT — ENCOUNTER SYMPTOMS
ABDOMINAL DISTENTION: 1
VOMITING: 0
ABDOMINAL PAIN: 1
SHORTNESS OF BREATH: 0
COUGH: 1
CONSTIPATION: 0
NAUSEA: 0
EYES NEGATIVE: 1
DIARRHEA: 1
BACK PAIN: 1

## 2024-02-06 DIAGNOSIS — G89.29 CHRONIC ABDOMINAL PAIN: ICD-10-CM

## 2024-02-06 DIAGNOSIS — R10.9 CHRONIC ABDOMINAL PAIN: ICD-10-CM

## 2024-02-06 LAB
6-ACETYLMORPHINE, UR: NOT DETECTED
7-AMINOCLONAZEPAM, URINE: PRESENT
ALPHA-OH-ALPRAZ, URINE: NOT DETECTED
ALPHA-OH-MIDAZOLAM, URINE: NOT DETECTED
ALPRAZOLAM, URINE: NOT DETECTED
AMPHETAMINES, URINE: NOT DETECTED
BARBITURATES, URINE: NEGATIVE
BENZOYLECGONINE, UR: NEGATIVE
BUPRENORPHINE URINE: NOT DETECTED
CARISOPRODOL, UR: NEGATIVE
CLONAZEPAM, URINE: NOT DETECTED
CODEINE, URINE: NOT DETECTED
CREAT UR-MCNC: 113.2 MG/DL (ref 20–400)
DIAZEPAM, URINE: NOT DETECTED
EER PAIN MGT DRUG PANEL, HIGH RES/EMIT U: NORMAL
ETHYL GLUCURONIDE UR: NEGATIVE
FENTANYL URINE: NOT DETECTED
GABAPENTIN: PRESENT
HYDROCODONE, URINE: NOT DETECTED
HYDROMORPHONE, URINE: PRESENT
LORAZEPAM, URINE: NOT DETECTED
MARIJUANA METAB, UR: NEGATIVE
MDA, UR: NOT DETECTED
MDEA, EVE, UR: NOT DETECTED
MDMA URINE: NOT DETECTED
MEPERIDINE METAB, UR: NOT DETECTED
METHADONE, URINE: NEGATIVE
METHAMPHETAMINE, URINE: NOT DETECTED
METHYLPHENIDATE: NOT DETECTED
MIDAZOLAM, URINE: NOT DETECTED
MORPHINE, OPI1M: NOT DETECTED
NALOXONE URINE: NOT DETECTED
NORBUPRENORPHINE, URINE: NOT DETECTED
NORDIAZEPAM, URINE: NOT DETECTED
NORFENTANYL, URINE: NOT DETECTED
NORHYDROCODONE, URINE: NOT DETECTED
NOROXYCODONE, URINE: NOT DETECTED
NOROXYMORPHONE, URINE: NOT DETECTED
OXAZEPAM, URINE: NOT DETECTED
OXYCODONE URINE: NOT DETECTED
OXYMORPHONE, URINE: NOT DETECTED
PAIN MGT DRUG PANEL, HI RES, UR: NORMAL
PCP,URINE: NEGATIVE
PHENTERMINE, UR: NOT DETECTED
PREGABALIN: NOT DETECTED
TAPENTADOL, URINE: NOT DETECTED
TAPENTADOL-O-SULFATE, URINE: NOT DETECTED
TEMAZEPAM, URINE: NOT DETECTED
TRAMADOL, URINE: NEGATIVE
ZOLPIDEM METABOLITE (ZCA), URINE: NOT DETECTED
ZOLPIDEM, URINE: NOT DETECTED

## 2024-02-06 NOTE — TELEPHONE ENCOUNTER
Received a call from [facility] scripts the patients directions and quantity for Dilaudid do not match. They need a new script sent in that stated TID PRN as noted. This was sent in from the last office visit.     Last Appt:  2/2/2024  Next Appt:   4/1/2024  Med verified in Epic

## 2024-02-08 RX ORDER — HYDROMORPHONE HYDROCHLORIDE 2 MG/1
2 TABLET ORAL 3 TIMES DAILY
Qty: 90 TABLET | Refills: 0 | Status: SHIPPED | OUTPATIENT
Start: 2024-02-08 | End: 2024-03-09

## 2024-02-14 ENCOUNTER — TELEPHONE (OUTPATIENT)
Dept: PAIN MANAGEMENT | Age: 69
End: 2024-02-14

## 2024-02-14 NOTE — TELEPHONE ENCOUNTER
[facility] called for more information on  Hydromorphone    Clinical information needed  -how often monitored  -previous tried/failed medications  -what is the future plan of pain management    [phone]     Ref # [id number]

## 2024-04-11 ENCOUNTER — TELEPHONE (OUTPATIENT)
Dept: PAIN MANAGEMENT | Age: 69
End: 2024-04-11

## 2024-04-11 NOTE — TELEPHONE ENCOUNTER
[place] and NP at [facility] called because patient is currently admitted for abdominal pain. They did not find anything acute and are concerned that she may have nerve entrapment syndrome and could benefit from TPI?    Please advise and call [place] back.        [phone], [place] TORO

## 2024-04-15 DIAGNOSIS — R10.9 CHRONIC ABDOMINAL PAIN: ICD-10-CM

## 2024-04-15 DIAGNOSIS — G89.29 CHRONIC ABDOMINAL PAIN: ICD-10-CM

## 2024-04-15 RX ORDER — HYDROMORPHONE HYDROCHLORIDE 2 MG/1
2 TABLET ORAL 3 TIMES DAILY
Qty: 90 TABLET | Refills: 0 | Status: SHIPPED | OUTPATIENT
Start: 2024-04-15 | End: 2024-05-15

## 2024-04-15 NOTE — TELEPHONE ENCOUNTER
Kate called requesting a refill of the below medication which has been pended for you:     Requested Prescriptions     Pending Prescriptions Disp Refills    HYDROmorphone (DILAUDID) 2 MG tablet 90 tablet 0     Sig: Take 1 tablet by mouth 3 times daily for 30 days. Max Daily Amount: 6 mg       Last Appointment Date: 2/2/2024  Next Appointment Date: 4/25/2024    Allergies   Allergen Reactions    Acetaminophen-Codeine     Codeine Shortness Of Breath    Tylenol With Codeine #3 [Acetaminophen-Codeine] Shortness Of Breath    Vicodin [Hydrocodone-Acetaminophen] Shortness Of Breath     Makes her feel crazy    Ace Inhibitors Other (See Comments)     Cough    Aspirin      Makes her heart race      Azulfidine [Sulfasalazine] Hives    [place]-D [Diphenhydramine] Other (See Comments)    Erythromycin      pt has poor renal function and was told not to use the mycin antibiotic group    Fentanyl      Injection only    Medrol [Methylprednisolone]      Tolerates prednisone okay    Makes her stomach hurt. She also stated that Prednisone makes her sick.     Morphine Hives    Other      Mycins (not to have per liver transplant team)    Pentosan Polysulfate Sodium Other (See Comments)     bruising  Other reaction(s): Other (See Comments)  bruising  bruising    Reglan [Metoclopramide]      Medical induced parkinsons    Tramadol      Makes her feel crazy    Phenergan [Promethazine]      Restless leg       Pharmacy:  [facility]    OARRS Report checked for [place], [place], and [place]:Dilaudid 2 2/20/24 #21. Due NOW.

## 2024-04-25 ENCOUNTER — OFFICE VISIT (OUTPATIENT)
Dept: PAIN MANAGEMENT | Age: 69
End: 2024-04-25
Payer: COMMERCIAL

## 2024-04-25 VITALS
BODY MASS INDEX: 23.55 KG/M2 | SYSTOLIC BLOOD PRESSURE: 136 MMHG | DIASTOLIC BLOOD PRESSURE: 80 MMHG | RESPIRATION RATE: 16 BRPM | WEIGHT: 159 LBS | HEART RATE: 69 BPM | HEIGHT: 69 IN | OXYGEN SATURATION: 98 %

## 2024-04-25 DIAGNOSIS — R10.9 CHRONIC ABDOMINAL PAIN: ICD-10-CM

## 2024-04-25 DIAGNOSIS — G89.29 CHRONIC ABDOMINAL PAIN: ICD-10-CM

## 2024-04-25 DIAGNOSIS — G56.03 BILATERAL CARPAL TUNNEL SYNDROME: ICD-10-CM

## 2024-04-25 DIAGNOSIS — M54.12 CERVICAL RADICULOPATHY: Primary | ICD-10-CM

## 2024-04-25 PROCEDURE — 3079F DIAST BP 80-89 MM HG: CPT | Performed by: NURSE PRACTITIONER

## 2024-04-25 PROCEDURE — 99214 OFFICE O/P EST MOD 30 MIN: CPT | Performed by: NURSE PRACTITIONER

## 2024-04-25 PROCEDURE — 1123F ACP DISCUSS/DSCN MKR DOCD: CPT | Performed by: NURSE PRACTITIONER

## 2024-04-25 PROCEDURE — 3075F SYST BP GE 130 - 139MM HG: CPT | Performed by: NURSE PRACTITIONER

## 2024-04-25 RX ORDER — TRAZODONE HYDROCHLORIDE 50 MG/1
TABLET ORAL
COMMUNITY
Start: 2024-02-18

## 2024-04-25 RX ORDER — ACETAMINOPHEN 500 MG
500 TABLET ORAL 3 TIMES DAILY
COMMUNITY
Start: 2024-04-14

## 2024-04-25 RX ORDER — DICYCLOMINE HCL 20 MG
TABLET ORAL
COMMUNITY
Start: 2024-04-02

## 2024-04-25 RX ORDER — MYCOPHENOLIC ACID 360 MG/1
1 TABLET, DELAYED RELEASE ORAL 2 TIMES DAILY
COMMUNITY
Start: 2024-03-21

## 2024-04-25 RX ORDER — PREGABALIN 75 MG/1
75 CAPSULE ORAL 3 TIMES DAILY
COMMUNITY
Start: 2024-04-14 | End: 2024-07-13

## 2024-04-25 RX ORDER — METHOCARBAMOL 500 MG/1
500 TABLET, FILM COATED ORAL 4 TIMES DAILY PRN
COMMUNITY
Start: 2024-04-14

## 2024-04-25 RX ORDER — DIPHENOXYLATE HYDROCHLORIDE AND ATROPINE SULFATE 2.5; .025 MG/1; MG/1
1 TABLET ORAL 3 TIMES DAILY PRN
COMMUNITY
Start: 2024-02-21

## 2024-04-25 ASSESSMENT — ENCOUNTER SYMPTOMS
ABDOMINAL PAIN: 1
SHORTNESS OF BREATH: 0
NAUSEA: 0
EYES NEGATIVE: 1
ABDOMINAL DISTENTION: 1
COUGH: 1
CONSTIPATION: 0
BACK PAIN: 1
VOMITING: 0
DIARRHEA: 1

## 2024-04-25 NOTE — PROGRESS NOTES
[facility]  [address] [place]. [address]    Patient Name: Kate Espinosa  MRN: [id number]  Encounter Date: 4/25/2024     SUBJECTIVE:  Kate Espinosa is a 68 y.o., female being seen today regarding   Chief Complaint   Patient presents with    Pelvic Pain    and routine medication management.     Here today for routine pain clinic recheck.     Chronic pain, managed  on dilaudid.      New large hernia, increased pain, risky surgery, evaluated in CC, may not be having surgery and have to deal with the pain. Recommended abdominal TPI's    Functionality Assessment & Goals:  On a scale of 0 (Does not Interfere) to 10 (Completely Interferes)  Which number describes how during the past week pain has interfered with the following:   A.  General Activity: 5    B.  Mood:  5   C.  Walking Ability:  7   D.  Normal Work (Includes both work outside the home and housework):  7   E.  Relations with Other People:  5   F.  Sleep:  10    G.  Enjoyment of Life:  8  2.  Patient prefers to Take their Pain Medications:   [] On a regular basis   [x] Only when necessary   [] Does not take pain medications  3.  What are the Patient's Goals/ Expectations for Visiting Pain Management?   [] Learn about my pain   [] Physical Therapy   [] Receive Injections   [] Deal with Anxiety and Stress   [x] Receive Medication   [] Treat Depression    [] Treat Sleep   [] Treat Opioid Dependence/ Addiction    Most recent Oswestry Disability Questionnaire completed by patient on      Current Pain Assessment:         4/25/2024     1:22 PM   AMB PAIN ASSESSMENT   Location of Pain Pelvis   Location Modifiers Left;Right;Medial   Severity of Pain 7   Quality of Pain Sharp   Duration of Pain Persistent   Frequency of Pain Constant   Aggravating Factors Bending;Stretching;Straightening;Exercise;Kneeling;Squatting;Standing;Walking;Stairs   Limiting Behavior Yes   Relieving Factors Rest   Result of Injury No   Work-Related Injury

## 2024-05-02 ENCOUNTER — PROCEDURE VISIT (OUTPATIENT)
Dept: PAIN MANAGEMENT | Age: 69
End: 2024-05-02

## 2024-05-02 VITALS
RESPIRATION RATE: 12 BRPM | BODY MASS INDEX: 22.81 KG/M2 | OXYGEN SATURATION: 99 % | DIASTOLIC BLOOD PRESSURE: 62 MMHG | HEIGHT: 69 IN | HEART RATE: 71 BPM | WEIGHT: 154 LBS | SYSTOLIC BLOOD PRESSURE: 116 MMHG

## 2024-05-02 DIAGNOSIS — M79.18 MYOFASCIAL PAIN: Primary | ICD-10-CM

## 2024-05-02 NOTE — PROGRESS NOTES
Kate Espinosa  1955  5/2/24      PAIN MANAGEMENT CLINIC PROCEDURE NOTE    CHIEF COMPLAINT: This is a 68 y.o. female patient who presents to the Pain Management Clinic with a history of pain in the abdomen.    PRE-PROCEDURE DIAGNOSIS: myofascial pain    POST-PROCEDURE DIAGNOSIS: same as pre-procedure diagnosis    Vitals:    05/02/24 1018   BP: 116/62   Pulse: 71   Resp: 12   SpO2: 99%     PROCEDURE:     The procedure was explained, including risks and benefits, and the patient has agreed to proceed. The injection site was marked on the patient’s skin. A large area around the injection site was cleaned with chlora-prep.    TRIGGER POINT INJECTIONS-abdomen    A 27G 1/5 inch needle was inserted into each muscle. Depth and direction of the needle were monitored at all times. The needle was advanced until a muscle twitch response was felt and the patient reported concordant pain. The syringe was aspirated and was negative for heme or air. Then, a combination of 1ml of 2%lidocaine, and 10mg of kenalog (NDC# 3107-3234-44) was injected. The needle was then moved in and out of the muscle at the same depth to break up additional muscle spasms. A total of 6 trigger points were injected.        The injection site was cleaned and dressed with a spot band aid.  The patient tolerated the procedure well and with out complication The patient was instructed avoid heat and excessive activity for 24-48 hours.

## 2024-05-21 DIAGNOSIS — R10.9 CHRONIC ABDOMINAL PAIN: ICD-10-CM

## 2024-05-21 DIAGNOSIS — G89.29 CHRONIC ABDOMINAL PAIN: ICD-10-CM

## 2024-05-21 RX ORDER — HYDROMORPHONE HYDROCHLORIDE 2 MG/1
2 TABLET ORAL 3 TIMES DAILY
Qty: 90 TABLET | Refills: 0 | Status: SHIPPED | OUTPATIENT
Start: 2024-05-21 | End: 2024-06-20

## 2024-05-21 NOTE — TELEPHONE ENCOUNTER
Kate Espinosa  called requesting a refill of the below medication which has been pended for you:     Requested Prescriptions     Pending Prescriptions Disp Refills    HYDROmorphone (DILAUDID) 2 MG tablet 90 tablet 0     Sig: Take 1 tablet by mouth 3 times daily for 30 days. Max Daily Amount: 6 mg       Allergies   Allergen Reactions    Acetaminophen-Codeine     Codeine Shortness Of Breath    Tylenol With Codeine #3 [Acetaminophen-Codeine] Shortness Of Breath    Vicodin [Hydrocodone-Acetaminophen] Shortness Of Breath     Makes her feel crazy    Ace Inhibitors Other (See Comments)     Cough    Aspirin      Makes her heart race      Azulfidine [Sulfasalazine] Hives    [place]-D [Diphenhydramine] Other (See Comments)    Erythromycin      pt has poor renal function and was told not to use the mycin antibiotic group    Fentanyl      Injection only    Medrol [Methylprednisolone]      Tolerates prednisone okay    Makes her stomach hurt. She also stated that Prednisone makes her sick.     Morphine Hives    Other      Mycins (not to have per liver transplant team)    Pentosan Polysulfate Sodium Other (See Comments)     bruising  Other reaction(s): Other (See Comments)  bruising  bruising    Reglan [Metoclopramide]      Medical induced parkinsons    Tramadol      Makes her feel crazy    Phenergan [Promethazine]      Restless leg             Last Drug Screen:  2/2/24  Last Appointment:  5/2/2024   Next Appointment:  6/27/2024   OARRS Report checked for [place], [place], and [place]: dILAUDID 2MG  #90. DueNOW 5-13-24.

## 2024-05-22 ENCOUNTER — TELEPHONE (OUTPATIENT)
Dept: PAIN MANAGEMENT | Age: 69
End: 2024-05-22

## 2024-05-22 NOTE — TELEPHONE ENCOUNTER
Writer called [facility] and pharmacy wants to know if patient should be on:    Lyrica 75 mg TID fill on 5/16/ 24 at local pharmacy    Gabapentin 800 mg 1/2 tablet BID filled 1/11/24, [facility].    Methocarbamol 500 mg filled locally 4 tables day filled 5/10/24    Pharmacy wants provider to be aware of Lyrica & methocarbamol possible side effects to    hydromorphone.

## 2024-05-22 NOTE — TELEPHONE ENCOUNTER
[facility] called wanting to talk to Dr. Saldaña or a nurse about patients med list. Pharmasist Emma states patient is prescribed Lyrica and methocarbamol and wants to discuss this.    Requesting a call back specifically from Dr. Saldaña or a nurse. I am assuming Emma can do this also.    Ref#- [id number]    - [phone]

## 2024-05-23 NOTE — TELEPHONE ENCOUNTER
[facility] called again and wants to know if they can fill the Hydromorphone with the knowledge of the side effects.

## 2024-06-27 ENCOUNTER — OFFICE VISIT (OUTPATIENT)
Dept: PAIN MANAGEMENT | Age: 69
End: 2024-06-27
Payer: COMMERCIAL

## 2024-06-27 VITALS
BODY MASS INDEX: 24.44 KG/M2 | HEART RATE: 81 BPM | RESPIRATION RATE: 12 BRPM | WEIGHT: 165 LBS | DIASTOLIC BLOOD PRESSURE: 68 MMHG | SYSTOLIC BLOOD PRESSURE: 114 MMHG | OXYGEN SATURATION: 97 % | HEIGHT: 69 IN

## 2024-06-27 DIAGNOSIS — G62.89 OTHER POLYNEUROPATHY: ICD-10-CM

## 2024-06-27 DIAGNOSIS — M54.12 CERVICAL RADICULOPATHY: ICD-10-CM

## 2024-06-27 DIAGNOSIS — G89.29 CHRONIC ABDOMINAL PAIN: Primary | ICD-10-CM

## 2024-06-27 DIAGNOSIS — R10.9 CHRONIC ABDOMINAL PAIN: Primary | ICD-10-CM

## 2024-06-27 PROCEDURE — 3074F SYST BP LT 130 MM HG: CPT | Performed by: NURSE PRACTITIONER

## 2024-06-27 PROCEDURE — 1123F ACP DISCUSS/DSCN MKR DOCD: CPT | Performed by: NURSE PRACTITIONER

## 2024-06-27 PROCEDURE — 3078F DIAST BP <80 MM HG: CPT | Performed by: NURSE PRACTITIONER

## 2024-06-27 PROCEDURE — 99214 OFFICE O/P EST MOD 30 MIN: CPT | Performed by: NURSE PRACTITIONER

## 2024-06-27 RX ORDER — AMITRIPTYLINE HYDROCHLORIDE 10 MG/1
10 TABLET, FILM COATED ORAL NIGHTLY
COMMUNITY
Start: 2024-05-10 | End: 2024-08-08

## 2024-06-27 ASSESSMENT — ENCOUNTER SYMPTOMS
ABDOMINAL PAIN: 1
NAUSEA: 0
COUGH: 1
DIARRHEA: 1
ABDOMINAL DISTENTION: 1
EYES NEGATIVE: 1
CONSTIPATION: 0
SHORTNESS OF BREATH: 0
BACK PAIN: 1
VOMITING: 0

## 2024-06-27 NOTE — PROGRESS NOTES
sertraline (ZOLOFT) 100 mg, Oral, DAILY    sulfamethoxazole-trimethoprim (BACTRIM DS;SEPTRA DS) 800-160 MG per tablet TAKE ONE-HALF (1/2) TABLET 5 DAYS A WEEK ( MONDAY THROUGH FRIDAY )    traZODone (DESYREL) 50 MG tablet TAKE 1/2 (ONE-HALF) TABLET BY ORAL FEEDING TUBE AT BEDTIME    Vitamin D, Cholecalciferol, 400 units CAPS Oral       Allergies   Allergen Reactions    Acetaminophen-Codeine     Codeine Shortness Of Breath    Tylenol With Codeine #3 [Acetaminophen-Codeine] Shortness Of Breath    Vicodin [Hydrocodone-Acetaminophen] Shortness Of Breath     Makes her feel crazy    Ace Inhibitors Other (See Comments)     Cough    Aspirin      Makes her heart race      Azulfidine [Sulfasalazine] Hives    Laura-D [Diphenhydramine] Other (See Comments)    Erythromycin      pt has poor renal function and was told not to use the mycin antibiotic group    Fentanyl      Injection only    Medrol [Methylprednisolone]      Tolerates prednisone okay    Makes her stomach hurt. She also stated that Prednisone makes her sick.     Morphine Hives    Other      Mycins (not to have per liver transplant team)    Pentosan Polysulfate Sodium Other (See Comments)     bruising  Other reaction(s): Other (See Comments)  bruising  bruising    Reglan [Metoclopramide]      Medical induced parkinsons    Tramadol      Makes her feel crazy    Phenergan [Promethazine]      Restless leg       Review of Systems:   Review of Systems   Constitutional:  Positive for appetite change and fatigue. Negative for fever.   Eyes: Negative.    Respiratory:  Positive for cough. Negative for shortness of breath.    Cardiovascular: Negative.    Gastrointestinal:  Positive for abdominal distention, abdominal pain and diarrhea. Negative for constipation, nausea and vomiting.   Endocrine: Negative.    Genitourinary:         History CKD   Musculoskeletal:  Positive for back pain, neck pain and neck stiffness. Negative for arthralgias.   Skin: Negative.  Negative for rash

## 2024-07-18 ENCOUNTER — PROCEDURE VISIT (OUTPATIENT)
Dept: PAIN MANAGEMENT | Age: 69
End: 2024-07-18

## 2024-07-18 VITALS
HEART RATE: 78 BPM | OXYGEN SATURATION: 99 % | DIASTOLIC BLOOD PRESSURE: 62 MMHG | RESPIRATION RATE: 17 BRPM | WEIGHT: 164 LBS | SYSTOLIC BLOOD PRESSURE: 132 MMHG | HEIGHT: 69 IN | BODY MASS INDEX: 24.29 KG/M2

## 2024-07-18 DIAGNOSIS — M79.18 MYOFASCIAL PAIN: ICD-10-CM

## 2024-07-18 DIAGNOSIS — R10.9 CHRONIC ABDOMINAL PAIN: Primary | ICD-10-CM

## 2024-07-18 DIAGNOSIS — G89.29 CHRONIC ABDOMINAL PAIN: Primary | ICD-10-CM

## 2024-07-18 RX ORDER — LIDOCAINE HYDROCHLORIDE 20 MG/ML
5 INJECTION, SOLUTION EPIDURAL; INFILTRATION; INTRACAUDAL; PERINEURAL ONCE
Status: COMPLETED | OUTPATIENT
Start: 2024-07-18 | End: 2024-07-18

## 2024-07-18 RX ADMIN — LIDOCAINE HYDROCHLORIDE 5 ML: 20 INJECTION, SOLUTION EPIDURAL; INFILTRATION; INTRACAUDAL; PERINEURAL at 13:17

## 2024-07-18 NOTE — PROGRESS NOTES
Kate Espinosa  1955  5/2/24      PAIN MANAGEMENT CLINIC PROCEDURE NOTE    CHIEF COMPLAINT: This is a 68 y.o. female patient who presents to the Pain Management Clinic with a history of pain in the abdomen.    PRE-PROCEDURE DIAGNOSIS: myofascial pain    POST-PROCEDURE DIAGNOSIS: same as pre-procedure diagnosis    Vitals:    07/18/24 1304   BP: 132/62   Pulse: 78   Resp: 17   SpO2: 99%     PROCEDURE:     The procedure was explained, including risks and benefits, and the patient has agreed to proceed. The injection site was marked on the patient’s skin. A large area around the injection site was cleaned with chlora-prep.    TRIGGER POINT INJECTIONS-abdomen    A 27G 1/5 inch needle was inserted into each muscle. Depth and direction of the needle were monitored at all times. The needle was advanced until a muscle twitch response was felt and the patient reported concordant pain. The syringe was aspirated and was negative for heme or air. Then, a combination of 1ml of 2%lidocaine, and 10mg of kenalog (NDC# 9697-1338-17) was injected. The needle was then moved in and out of the muscle at the same depth to break up additional muscle spasms. A total of 6 trigger points were injected.        The injection site was cleaned and dressed with a spot band aid.  The patient tolerated the procedure well and with out complication The patient was instructed avoid heat and excessive activity for 24-48 hours.

## 2024-08-28 ENCOUNTER — OFFICE VISIT (OUTPATIENT)
Dept: PAIN MANAGEMENT | Age: 69
End: 2024-08-28
Payer: COMMERCIAL

## 2024-08-28 ENCOUNTER — HOSPITAL ENCOUNTER (OUTPATIENT)
Age: 69
Setting detail: SPECIMEN
Discharge: HOME | End: 2024-08-28
Payer: COMMERCIAL

## 2024-08-28 VITALS
WEIGHT: 166 LBS | SYSTOLIC BLOOD PRESSURE: 116 MMHG | BODY MASS INDEX: 24.59 KG/M2 | HEIGHT: 69 IN | DIASTOLIC BLOOD PRESSURE: 78 MMHG

## 2024-08-28 DIAGNOSIS — Z79.891 ENCOUNTER FOR LONG-TERM OPIATE ANALGESIC USE: ICD-10-CM

## 2024-08-28 DIAGNOSIS — G89.29 CHRONIC ABDOMINAL PAIN: ICD-10-CM

## 2024-08-28 DIAGNOSIS — R10.9 CHRONIC ABDOMINAL PAIN: ICD-10-CM

## 2024-08-28 DIAGNOSIS — G89.29 CHRONIC ABDOMINAL PAIN: Primary | ICD-10-CM

## 2024-08-28 DIAGNOSIS — Z02.83 ENCOUNTER FOR DRUG SCREENING: ICD-10-CM

## 2024-08-28 DIAGNOSIS — R10.9 CHRONIC ABDOMINAL PAIN: Primary | ICD-10-CM

## 2024-08-28 DIAGNOSIS — G89.4 CHRONIC PAIN SYNDROME: ICD-10-CM

## 2024-08-28 PROCEDURE — 1123F ACP DISCUSS/DSCN MKR DOCD: CPT | Performed by: NURSE PRACTITIONER

## 2024-08-28 PROCEDURE — 3074F SYST BP LT 130 MM HG: CPT | Performed by: NURSE PRACTITIONER

## 2024-08-28 PROCEDURE — 3078F DIAST BP <80 MM HG: CPT | Performed by: NURSE PRACTITIONER

## 2024-08-28 PROCEDURE — G0481 DRUG TEST DEF 8-14 CLASSES: HCPCS

## 2024-08-28 PROCEDURE — 99214 OFFICE O/P EST MOD 30 MIN: CPT | Performed by: NURSE PRACTITIONER

## 2024-08-28 PROCEDURE — 80307 DRUG TEST PRSMV CHEM ANLYZR: CPT

## 2024-08-28 RX ORDER — HYDROMORPHONE HYDROCHLORIDE 2 MG/1
2 TABLET ORAL 3 TIMES DAILY
Qty: 90 TABLET | Refills: 0 | Status: SHIPPED | OUTPATIENT
Start: 2024-08-28 | End: 2024-09-27

## 2024-08-28 ASSESSMENT — ENCOUNTER SYMPTOMS
EYES NEGATIVE: 1
ABDOMINAL PAIN: 1
NAUSEA: 0
SHORTNESS OF BREATH: 0
ABDOMINAL DISTENTION: 1
DIARRHEA: 1
COUGH: 1
CONSTIPATION: 0
VOMITING: 0
BACK PAIN: 1

## 2024-08-28 NOTE — PROGRESS NOTES
ORAL FEEDING TUBE AT BEDTIME   • Vitamin D, Cholecalciferol, 400 units CAPS Oral       Allergies   Allergen Reactions   • Acetaminophen-Codeine    • Codeine Shortness Of Breath   • Tylenol With Codeine #3 [Acetaminophen-Codeine] Shortness Of Breath   • Vicodin [Hydrocodone-Acetaminophen] Shortness Of Breath     Makes her feel crazy   • Ace Inhibitors Other (See Comments)     Cough   • Aspirin      Makes her heart race     • Azulfidine [Sulfasalazine] Hives   • Laura-D [Diphenhydramine] Other (See Comments)   • Erythromycin      pt has poor renal function and was told not to use the mycin antibiotic group   • Fentanyl      Injection only   • Medrol [Methylprednisolone]      Tolerates prednisone okay    Makes her stomach hurt. She also stated that Prednisone makes her sick.    • Morphine Hives   • Other      Mycins (not to have per liver transplant team)   • Pentosan Polysulfate Sodium Other (See Comments)     bruising  Other reaction(s): Other (See Comments)  bruising  bruising   • Reglan [Metoclopramide]      Medical induced parkinsons   • Tramadol      Makes her feel crazy   • Phenergan [Promethazine]      Restless leg       Review of Systems:   Review of Systems   Constitutional:  Positive for appetite change and fatigue. Negative for fever.   Eyes: Negative.    Respiratory:  Positive for cough. Negative for shortness of breath.    Cardiovascular: Negative.    Gastrointestinal:  Positive for abdominal distention, abdominal pain and diarrhea. Negative for constipation, nausea and vomiting.   Endocrine: Negative.    Genitourinary:         History CKD   Musculoskeletal:  Positive for back pain, neck pain and neck stiffness. Negative for arthralgias.   Skin: Negative.  Negative for rash and wound.   Neurological:  Positive for headaches. Negative for dizziness and weakness.   Hematological: Negative.    Psychiatric/Behavioral:  Positive for sleep disturbance. Negative for confusion and suicidal ideas. The patient is  not nervous/anxious.         OBJECTIVE:  Vitals:    08/28/24 1410   BP: 116/78       PHYSICAL EXAM  Physical Exam  Vitals reviewed.   Constitutional:       General: She is awake. She is not in acute distress.     Appearance: Normal appearance. She is well-developed. She is not ill-appearing, toxic-appearing or diaphoretic.      Interventions: She is not intubated.  HENT:      Head: Normocephalic and atraumatic.      Right Ear: External ear normal.      Left Ear: External ear normal.      Nose: Nose normal.      Mouth/Throat:      Lips: Pink.      Mouth: Mucous membranes are moist.   Eyes:      General: Lids are normal.   Cardiovascular:      Rate and Rhythm: Normal rate.   Pulmonary:      Effort: Pulmonary effort is normal. No tachypnea, bradypnea, accessory muscle usage, prolonged expiration, respiratory distress or retractions. She is not intubated.      Breath sounds: No stridor. No wheezing, rhonchi or rales.   Chest:      Chest wall: No tenderness.   Abdominal:      Palpations: Abdomen is soft.   Skin:     General: Skin is warm and dry.      Capillary Refill: Capillary refill takes less than 2 seconds.      Coloration: Skin is not ashen, jaundiced or pale.      Findings: No rash.      Nails: There is no clubbing.   Neurological:      Mental Status: She is alert and oriented to person, place, and time.      GCS: GCS eye subscore is 4. GCS verbal subscore is 5. GCS motor subscore is 6.      Cranial Nerves: No cranial nerve deficit.   Psychiatric:         Attention and Perception: Attention and perception normal.         Mood and Affect: Mood and affect normal.         Speech: Speech normal.         Behavior: Behavior is cooperative.         Cognition and Memory: Cognition normal.         Judgment: Judgment normal.        ASSESSMENT:    ICD-10-CM    1. Chronic abdominal pain  R10.9 Pain Management Drug Screen    G89.29       2. Encounter for long-term opiate analgesic use  Z79.891 Pain Management Drug Screen

## 2024-08-31 LAB
6-ACETYLMORPHINE, UR: NOT DETECTED
7-AMINOCLONAZEPAM, URINE: PRESENT
ALPHA-OH-ALPRAZ, URINE: NOT DETECTED
ALPHA-OH-MIDAZOLAM, URINE: NOT DETECTED
ALPRAZOLAM, URINE: NOT DETECTED
AMPHETAMINE, URINE: NOT DETECTED
BARBITURATES, URINE: NEGATIVE
BENZOYLECGONINE, UR: NEGATIVE
BUPRENORPHINE URINE: NOT DETECTED
CARISOPRODOL, UR: NEGATIVE
CLONAZEPAM, URINE: NOT DETECTED
CODEINE, URINE: NOT DETECTED
CREAT UR-MCNC: 102.2 MG/DL (ref 20–400)
DIAZEPAM, URINE: NOT DETECTED
EER PAIN MGT DRUG PANEL, HIGH RES/EMIT U: NORMAL
ETHYL GLUCURONIDE UR: NEGATIVE
FENTANYL URINE: NOT DETECTED
GABAPENTIN: NOT DETECTED
HYDROCODONE, URINE: NOT DETECTED
HYDROMORPHONE, URINE: NOT DETECTED
LORAZEPAM, URINE: NOT DETECTED
MARIJUANA METAB, UR: NEGATIVE
MDA, URINE: NOT DETECTED
MDEA, EVE, UR: NOT DETECTED
MDMA, URINE: NOT DETECTED
MEPERIDINE METAB, UR: NOT DETECTED
METHADONE, URINE: NEGATIVE
METHAMPHETAMINE, URINE: NOT DETECTED
METHYLPHENIDATE: NOT DETECTED
MIDAZOLAM, URINE: NOT DETECTED
MORPHINE, OPI1M: NOT DETECTED
NALOXONE URINE: NOT DETECTED
NORBUPRENORPHINE, URINE: NOT DETECTED
NORDIAZEPAM, URINE: NOT DETECTED
NORFENTANYL, URINE: NOT DETECTED
NORHYDROCODONE, URINE: NOT DETECTED
NOROXYCODONE, URINE: NOT DETECTED
NOROXYMORPHONE, URINE: NOT DETECTED
OXAZEPAM, URINE: NOT DETECTED
OXYCODONE URINE: NOT DETECTED
OXYMORPHONE, URINE: NOT DETECTED
PAIN MGT DRUG PANEL, HI RES, UR: NORMAL
PCP,URINE: NEGATIVE
PHENTERMINE, UR: NOT DETECTED
PREGABALIN: PRESENT
TAPENTADOL, URINE: NOT DETECTED
TAPENTADOL-O-SULFATE, URINE: NOT DETECTED
TEMAZEPAM, URINE: NOT DETECTED
TRAMADOL, URINE: NEGATIVE
ZOLPIDEM METABOLITE (ZCA), URINE: NOT DETECTED
ZOLPIDEM, URINE: NOT DETECTED

## 2024-11-20 ENCOUNTER — OFFICE VISIT (OUTPATIENT)
Dept: PAIN MANAGEMENT | Age: 69
End: 2024-11-20
Payer: COMMERCIAL

## 2024-11-20 VITALS
BODY MASS INDEX: 25.33 KG/M2 | WEIGHT: 171 LBS | DIASTOLIC BLOOD PRESSURE: 78 MMHG | SYSTOLIC BLOOD PRESSURE: 132 MMHG | HEIGHT: 69 IN

## 2024-11-20 DIAGNOSIS — M54.12 CERVICAL RADICULOPATHY: Primary | ICD-10-CM

## 2024-11-20 DIAGNOSIS — M79.2 CHRONIC NEUROPATHIC PAIN: ICD-10-CM

## 2024-11-20 DIAGNOSIS — G89.29 CHRONIC NEUROPATHIC PAIN: ICD-10-CM

## 2024-11-20 DIAGNOSIS — G62.89 OTHER POLYNEUROPATHY: ICD-10-CM

## 2024-11-20 DIAGNOSIS — R10.9 CHRONIC ABDOMINAL PAIN: ICD-10-CM

## 2024-11-20 DIAGNOSIS — G89.29 CHRONIC ABDOMINAL PAIN: ICD-10-CM

## 2024-11-20 DIAGNOSIS — M79.18 MYOFASCIAL PAIN: ICD-10-CM

## 2024-11-20 PROCEDURE — 3075F SYST BP GE 130 - 139MM HG: CPT | Performed by: NURSE PRACTITIONER

## 2024-11-20 PROCEDURE — 99214 OFFICE O/P EST MOD 30 MIN: CPT | Performed by: NURSE PRACTITIONER

## 2024-11-20 PROCEDURE — 3078F DIAST BP <80 MM HG: CPT | Performed by: NURSE PRACTITIONER

## 2024-11-20 PROCEDURE — 1123F ACP DISCUSS/DSCN MKR DOCD: CPT | Performed by: NURSE PRACTITIONER

## 2024-11-20 RX ORDER — HYDROMORPHONE HYDROCHLORIDE 2 MG/1
2 TABLET ORAL 3 TIMES DAILY
Qty: 90 TABLET | Refills: 0 | Status: SHIPPED | OUTPATIENT
Start: 2024-11-20 | End: 2024-12-20

## 2024-11-20 ASSESSMENT — ENCOUNTER SYMPTOMS
COUGH: 1
ABDOMINAL PAIN: 1
SHORTNESS OF BREATH: 0
NAUSEA: 0
BACK PAIN: 1
VOMITING: 0
ABDOMINAL DISTENTION: 1
EYES NEGATIVE: 1
CONSTIPATION: 0
DIARRHEA: 1

## 2024-11-20 NOTE — PROGRESS NOTES
Appearance: Normal appearance. She is well-developed. She is not ill-appearing, toxic-appearing or diaphoretic.      Interventions: She is not intubated.  HENT:      Head: Normocephalic and atraumatic.      Right Ear: External ear normal.      Left Ear: External ear normal.      Nose: Nose normal.      Mouth/Throat:      Lips: Pink.      Mouth: Mucous membranes are moist.   Eyes:      General: Lids are normal.   Cardiovascular:      Rate and Rhythm: Normal rate.   Pulmonary:      Effort: Pulmonary effort is normal. No tachypnea, bradypnea, accessory muscle usage, prolonged expiration, respiratory distress or retractions. She is not intubated.      Breath sounds: No stridor. No wheezing, rhonchi or rales.   Chest:      Chest wall: No tenderness.   Abdominal:      Palpations: Abdomen is soft.   Skin:     General: Skin is warm and dry.      Capillary Refill: Capillary refill takes less than 2 seconds.      Coloration: Skin is not ashen, jaundiced or pale.      Findings: No rash.      Nails: There is no clubbing.   Neurological:      Mental Status: She is alert and oriented to person, place, and time.      GCS: GCS eye subscore is 4. GCS verbal subscore is 5. GCS motor subscore is 6.      Cranial Nerves: No cranial nerve deficit.   Psychiatric:         Attention and Perception: Attention and perception normal.         Mood and Affect: Mood and affect normal.         Speech: Speech normal.         Behavior: Behavior is cooperative.         Cognition and Memory: Cognition normal.         Judgment: Judgment normal.        ASSESSMENT:    ICD-10-CM    1. Cervical radiculopathy  M54.12       2. Chronic abdominal pain  R10.9 HYDROmorphone (DILAUDID) 2 MG tablet    G89.29       3. Other polyneuropathy  G62.89       4. Chronic neuropathic pain  M79.2     G89.29       5. Myofascial pain  M79.18            I have reviewed the chief complaint and the history of present illness, vitals and all subjective documentation by [facility]

## 2024-12-16 ENCOUNTER — PROCEDURE VISIT (OUTPATIENT)
Dept: PAIN MANAGEMENT | Age: 69
End: 2024-12-16
Payer: COMMERCIAL

## 2024-12-16 VITALS
HEART RATE: 72 BPM | RESPIRATION RATE: 12 BRPM | SYSTOLIC BLOOD PRESSURE: 132 MMHG | WEIGHT: 169 LBS | HEIGHT: 69 IN | BODY MASS INDEX: 25.03 KG/M2 | OXYGEN SATURATION: 99 % | DIASTOLIC BLOOD PRESSURE: 70 MMHG

## 2024-12-16 DIAGNOSIS — G89.29 CHRONIC ABDOMINAL PAIN: Primary | ICD-10-CM

## 2024-12-16 DIAGNOSIS — R10.9 CHRONIC ABDOMINAL PAIN: Primary | ICD-10-CM

## 2024-12-16 PROCEDURE — 20552 NJX 1/MLT TRIGGER POINT 1/2: CPT | Performed by: NURSE PRACTITIONER

## 2024-12-16 RX ORDER — LIDOCAINE HYDROCHLORIDE 20 MG/ML
5 INJECTION, SOLUTION EPIDURAL; INFILTRATION; INTRACAUDAL; PERINEURAL ONCE
Status: COMPLETED | OUTPATIENT
Start: 2024-12-16 | End: 2024-12-16

## 2024-12-16 RX ADMIN — LIDOCAINE HYDROCHLORIDE 5 ML: 20 INJECTION, SOLUTION EPIDURAL; INFILTRATION; INTRACAUDAL; PERINEURAL at 15:53

## 2025-03-03 ENCOUNTER — OFFICE VISIT (OUTPATIENT)
Dept: PAIN MANAGEMENT | Age: 70
End: 2025-03-03
Payer: COMMERCIAL

## 2025-03-03 VITALS
SYSTOLIC BLOOD PRESSURE: 118 MMHG | HEART RATE: 98 BPM | OXYGEN SATURATION: 99 % | HEIGHT: 69 IN | WEIGHT: 169 LBS | RESPIRATION RATE: 12 BRPM | DIASTOLIC BLOOD PRESSURE: 62 MMHG | BODY MASS INDEX: 25.03 KG/M2

## 2025-03-03 DIAGNOSIS — R52 PAIN MANAGEMENT: ICD-10-CM

## 2025-03-03 DIAGNOSIS — G89.29 CHRONIC ABDOMINAL PAIN: Primary | ICD-10-CM

## 2025-03-03 DIAGNOSIS — Z79.891 CHRONIC PRESCRIPTION OPIATE USE: ICD-10-CM

## 2025-03-03 DIAGNOSIS — M79.2 CHRONIC NEUROPATHIC PAIN: ICD-10-CM

## 2025-03-03 DIAGNOSIS — G89.29 CHRONIC NEUROPATHIC PAIN: ICD-10-CM

## 2025-03-03 DIAGNOSIS — R10.9 CHRONIC ABDOMINAL PAIN: Primary | ICD-10-CM

## 2025-03-03 DIAGNOSIS — M54.12 CERVICAL RADICULOPATHY: ICD-10-CM

## 2025-03-03 DIAGNOSIS — N18.30 CHRONIC RENAL FAILURE, STAGE 3 (MODERATE), UNSPECIFIED WHETHER STAGE 3A OR 3B CKD (HCC): ICD-10-CM

## 2025-03-03 DIAGNOSIS — G62.89 OTHER POLYNEUROPATHY: ICD-10-CM

## 2025-03-03 PROCEDURE — 1123F ACP DISCUSS/DSCN MKR DOCD: CPT | Performed by: NURSE PRACTITIONER

## 2025-03-03 PROCEDURE — 99214 OFFICE O/P EST MOD 30 MIN: CPT | Performed by: NURSE PRACTITIONER

## 2025-03-03 PROCEDURE — 3074F SYST BP LT 130 MM HG: CPT | Performed by: NURSE PRACTITIONER

## 2025-03-03 PROCEDURE — 3078F DIAST BP <80 MM HG: CPT | Performed by: NURSE PRACTITIONER

## 2025-03-03 RX ORDER — BENZONATATE 100 MG/1
100 CAPSULE ORAL 3 TIMES DAILY PRN
COMMUNITY
Start: 2025-02-27

## 2025-03-03 RX ORDER — INSULIN GLARGINE-YFGN 100 [IU]/ML
10 INJECTION, SOLUTION SUBCUTANEOUS EVERY MORNING
COMMUNITY
Start: 2025-02-27

## 2025-03-03 RX ORDER — CYCLOBENZAPRINE HCL 10 MG
TABLET ORAL
COMMUNITY
Start: 2025-02-27

## 2025-03-03 RX ORDER — HYDROMORPHONE HYDROCHLORIDE 4 MG/1
4 TABLET ORAL 3 TIMES DAILY PRN
Qty: 90 TABLET | Refills: 0 | Status: SHIPPED | OUTPATIENT
Start: 2025-03-03 | End: 2025-04-02

## 2025-03-03 RX ORDER — HYDROMORPHONE HYDROCHLORIDE 2 MG/1
TABLET ORAL
COMMUNITY
Start: 2025-02-27

## 2025-03-03 ASSESSMENT — ENCOUNTER SYMPTOMS
NAUSEA: 0
COUGH: 1
ABDOMINAL PAIN: 1
CONSTIPATION: 0
EYES NEGATIVE: 1
VOMITING: 0
BACK PAIN: 1
DIARRHEA: 1
ABDOMINAL DISTENTION: 1
SHORTNESS OF BREATH: 0

## 2025-03-03 NOTE — PROGRESS NOTES
[facility]  [address] [place] [address]    Patient Name: Kate Espinosa  MRN: [id number]  Encounter Date: 3/3/2025     SUBJECTIVE:  Kate Espinosa is a 69 y.o., female being seen today regarding   Chief Complaint   Patient presents with    Abdominal Pain     medial    and routine medication management.    History of Present Illness  The patient is a 69-year-old female who presents for abdominal pain. She is accompanied by her daughter.    She has been experiencing severe abdominal pain, which she describes as intermittent but intense when it occurs. The current medication regimen of 2 mg, taken three times daily, appears insufficient in managing her pain. She does not take the medication daily, only during episodes of severe pain. Despite this, she reports feeling well today with minimal abdominal discomfort.     Supplemental Information  She is currently recovering from a recent bout of influenza A. Additionally, she mentions a need for increased fluid intake due to a pre-existing kidney condition.    Functionality Assessment & Goals:  On a scale of 0 (Does not Interfere) to 10 (Completely Interferes)  Which number describes how during the past week pain has interfered with the following:   A.  General Activity: 5    B.  Mood:  5   C.  Walking Ability:  7   D.  Normal Work (Includes both work outside the home and housework):  7   E.  Relations with Other People:  5   F.  Sleep:  10    G.  Enjoyment of Life:  8  2.  Patient prefers to Take their Pain Medications:   [] On a regular basis   [x] Only when necessary   [] Does not take pain medications  3.  What are the Patient's Goals/ Expectations for Visiting Pain Management?   [] Learn about my pain   [] Physical Therapy   [] Receive Injections   [] Deal with Anxiety and Stress   [x] Receive Medication   [] Treat Depression    [] Treat Sleep   [] Treat Opioid Dependence/ Addiction    Most recent [employer] Disability

## 2025-04-03 ENCOUNTER — OFFICE VISIT (OUTPATIENT)
Dept: PAIN MANAGEMENT | Age: 70
End: 2025-04-03
Payer: COMMERCIAL

## 2025-04-03 ENCOUNTER — HOSPITAL ENCOUNTER (OUTPATIENT)
Age: 70
Setting detail: SPECIMEN
Discharge: HOME | End: 2025-04-03
Payer: COMMERCIAL

## 2025-04-03 VITALS
HEIGHT: 69 IN | DIASTOLIC BLOOD PRESSURE: 82 MMHG | WEIGHT: 167 LBS | OXYGEN SATURATION: 100 % | RESPIRATION RATE: 12 BRPM | BODY MASS INDEX: 24.73 KG/M2 | HEART RATE: 86 BPM | SYSTOLIC BLOOD PRESSURE: 136 MMHG

## 2025-04-03 DIAGNOSIS — G89.29 CHRONIC ABDOMINAL PAIN: Primary | ICD-10-CM

## 2025-04-03 DIAGNOSIS — G89.29 CHRONIC ABDOMINAL PAIN: ICD-10-CM

## 2025-04-03 DIAGNOSIS — Z02.83 ENCOUNTER FOR DRUG SCREENING: ICD-10-CM

## 2025-04-03 DIAGNOSIS — R10.9 CHRONIC ABDOMINAL PAIN: ICD-10-CM

## 2025-04-03 DIAGNOSIS — G58.8 OTHER MONONEUROPATHY: ICD-10-CM

## 2025-04-03 DIAGNOSIS — Z79.891 ENCOUNTER FOR LONG-TERM OPIATE ANALGESIC USE: ICD-10-CM

## 2025-04-03 DIAGNOSIS — G89.29 CHRONIC NEUROPATHIC PAIN: ICD-10-CM

## 2025-04-03 DIAGNOSIS — M79.2 CHRONIC NEUROPATHIC PAIN: ICD-10-CM

## 2025-04-03 DIAGNOSIS — R52 PAIN MANAGEMENT: ICD-10-CM

## 2025-04-03 DIAGNOSIS — M50.20 HERNIATED CERVICAL DISC: ICD-10-CM

## 2025-04-03 DIAGNOSIS — R10.9 CHRONIC ABDOMINAL PAIN: Primary | ICD-10-CM

## 2025-04-03 DIAGNOSIS — M54.12 CERVICAL RADICULOPATHY: ICD-10-CM

## 2025-04-03 PROCEDURE — 1123F ACP DISCUSS/DSCN MKR DOCD: CPT | Performed by: NURSE PRACTITIONER

## 2025-04-03 PROCEDURE — 3079F DIAST BP 80-89 MM HG: CPT | Performed by: NURSE PRACTITIONER

## 2025-04-03 PROCEDURE — G0481 DRUG TEST DEF 8-14 CLASSES: HCPCS

## 2025-04-03 PROCEDURE — 3075F SYST BP GE 130 - 139MM HG: CPT | Performed by: NURSE PRACTITIONER

## 2025-04-03 PROCEDURE — 99215 OFFICE O/P EST HI 40 MIN: CPT | Performed by: NURSE PRACTITIONER

## 2025-04-03 PROCEDURE — 80307 DRUG TEST PRSMV CHEM ANLYZR: CPT

## 2025-04-03 RX ORDER — URSODIOL 300 MG/1
300 CAPSULE ORAL 3 TIMES DAILY
COMMUNITY
Start: 2025-03-12 | End: 2025-04-11

## 2025-04-03 ASSESSMENT — ENCOUNTER SYMPTOMS
ABDOMINAL DISTENTION: 1
EYES NEGATIVE: 1
VOMITING: 0
BACK PAIN: 1
NAUSEA: 0
COUGH: 1
SHORTNESS OF BREATH: 0
DIARRHEA: 1
CONSTIPATION: 0
ABDOMINAL PAIN: 1

## 2025-04-03 NOTE — PROGRESS NOTES
Bending;Stretching;Straightening;Exercise;Kneeling;Squatting;Standing;Walking;Stairs   Limiting Behavior Yes   Relieving Factors Rest   Result of Injury No   Work-Related Injury No   Are there other pain locations you wish to document? No        Current Medications & Allergies:   Current Outpatient Medications   Medication Instructions    acetaminophen (TYLENOL) 500 mg, 3 TIMES DAILY    benzonatate (TESSALON) 100 mg, 3 TIMES DAILY PRN    Biotin 10 MG CAPS Take by mouth    clonazePAM (KLONOPIN) 0.5 mg, 3 TIMES DAILY    cyclobenzaprine (FLEXERIL) 10 MG tablet     dicyclomine (BENTYL) 20 MG tablet TAKE 1 TABLET BY MOUTH 4 TIMES DAILY FOR 5 DAYS    diphenoxylate-atropine (LOMOTIL) 2.5-0.025 MG per tablet 1 tablet, 3 TIMES DAILY PRN    DROPLET PEN NEEDLES 32G X 4 MM MISC use 1 PEN NEEDLE to inject MEDICATION subcutaneously five times a day    Ferrous Sulfate (IRON PO) 1 tablet, DAILY    glucose blood VI test strips (ASCENSIA AUTODISC VI;ONE TOUCH ULTRA TEST VI) strip One touch ultra test strips. Test up to 4 times a day.    HUMALOG KWIKPEN 100 UNIT/ML SOPN inject INSULIN every morning and at bedtime PER SLIDING SCALE:151...  (REFER TO PRESCRIPTION NOTES).    HUMULIN N KWIKPEN 100 UNIT/ML injection pen INJECT 14 UNITS SUBCUTANEOUSLY ONCE DAILY    HYDROmorphone (DILAUDID) 2 MG tablet     Insulin Glargine-yfgn 10 Units, EVERY MORNING    insulin regular (HUMULIN R;NOVOLIN R) 18 Units, DAILY    levothyroxine (SYNTHROID) 137 MCG tablet DAILY    methocarbamol (ROBAXIN) 500 mg, 4 TIMES DAILY PRN    metoprolol tartrate (LOPRESSOR) 25 MG tablet take 1/2 tablet by mouth twice a day    Multiple Vitamins-Iron (MULTIVITAMIN/IRON PO) DAILY    mycophenolate (CELLCEPT) 250 MG capsule Indications: Taking 3 tabs BID    Mycophenolate Sodium 360 MG TBEC 1 tablet, 2 TIMES DAILY    ondansetron (ZOFRAN ODT) 4 mg, Oral, EVERY 8 HOURS PRN    pantoprazole (PROTONIX) 40 mg, DAILY    predniSONE (DELTASONE) 10 mg, DAILY    pregabalin (LYRICA) 75 mg,

## 2025-04-06 LAB
6-ACETYLMORPHINE, UR: NOT DETECTED
7-AMINOCLONAZEPAM, URINE: PRESENT
ALPHA-OH-ALPRAZ, URINE: NOT DETECTED
ALPHA-OH-MIDAZOLAM, URINE: NOT DETECTED
ALPRAZOLAM, URINE: NOT DETECTED
AMPHETAMINE, URINE: NOT DETECTED
BARBITURATES, URINE: NEGATIVE
BENZOYLECGONINE, UR: NEGATIVE
BUPRENORPHINE URINE: NOT DETECTED
CARISOPRODOL, UR: NEGATIVE
CLONAZEPAM, URINE: NOT DETECTED
CODEINE, URINE: NOT DETECTED
CREAT UR-MCNC: 106.7 MG/DL (ref 20–400)
DIAZEPAM, URINE: NOT DETECTED
EER PAIN MGT DRUG PANEL, HIGH RES/EMIT U: NORMAL
ETHYL GLUCURONIDE UR: NEGATIVE
FENTANYL URINE: NOT DETECTED
GABAPENTIN: NOT DETECTED
HYDROCODONE, URINE: NOT DETECTED
HYDROMORPHONE, URINE: NOT DETECTED
LORAZEPAM, URINE: NOT DETECTED
MARIJUANA METAB, UR: NEGATIVE
MDA, URINE: NOT DETECTED
MDEA, EVE, UR: NOT DETECTED
MDMA, URINE: NOT DETECTED
MEPERIDINE METAB, UR: NOT DETECTED
METHADONE, URINE: NEGATIVE
METHAMPHETAMINE, URINE: NOT DETECTED
METHYLPHENIDATE: NOT DETECTED
MIDAZOLAM, URINE: NOT DETECTED
MORPHINE, OPI1M: NOT DETECTED
NALOXONE URINE: NOT DETECTED
NORBUPRENORPHINE, URINE: NOT DETECTED
NORDIAZEPAM, URINE: NOT DETECTED
NORFENTANYL, URINE: NOT DETECTED
NORHYDROCODONE, URINE: NOT DETECTED
NOROXYCODONE, URINE: NOT DETECTED
NOROXYMORPHONE, URINE: NOT DETECTED
OXAZEPAM, URINE: NOT DETECTED
OXYCODONE URINE: NOT DETECTED
OXYMORPHONE, URINE: NOT DETECTED
PAIN MGT DRUG PANEL, HI RES, UR: NORMAL
PCP,URINE: NEGATIVE
PHENTERMINE, UR: NOT DETECTED
PREGABALIN: PRESENT
TAPENTADOL, URINE: NOT DETECTED
TAPENTADOL-O-SULFATE, URINE: NOT DETECTED
TEMAZEPAM, URINE: NOT DETECTED
TRAMADOL, URINE: NEGATIVE
ZOLPIDEM METABOLITE (ZCA), URINE: NOT DETECTED
ZOLPIDEM, URINE: NOT DETECTED

## 2025-05-29 ENCOUNTER — OFFICE VISIT (OUTPATIENT)
Dept: PAIN MANAGEMENT | Age: 70
End: 2025-05-29
Payer: COMMERCIAL

## 2025-05-29 VITALS
WEIGHT: 172 LBS | RESPIRATION RATE: 12 BRPM | HEIGHT: 69 IN | OXYGEN SATURATION: 96 % | DIASTOLIC BLOOD PRESSURE: 72 MMHG | HEART RATE: 88 BPM | SYSTOLIC BLOOD PRESSURE: 128 MMHG | BODY MASS INDEX: 25.48 KG/M2

## 2025-05-29 DIAGNOSIS — M79.2 CHRONIC NEUROPATHIC PAIN: Primary | ICD-10-CM

## 2025-05-29 DIAGNOSIS — R10.9 CHRONIC ABDOMINAL PAIN: ICD-10-CM

## 2025-05-29 DIAGNOSIS — G89.29 CHRONIC ABDOMINAL PAIN: ICD-10-CM

## 2025-05-29 DIAGNOSIS — G89.29 CHRONIC NEUROPATHIC PAIN: Primary | ICD-10-CM

## 2025-05-29 DIAGNOSIS — Z79.891 CHRONIC PRESCRIPTION OPIATE USE: ICD-10-CM

## 2025-05-29 DIAGNOSIS — R52 PAIN MANAGEMENT: ICD-10-CM

## 2025-05-29 PROCEDURE — 3078F DIAST BP <80 MM HG: CPT | Performed by: NURSE PRACTITIONER

## 2025-05-29 PROCEDURE — 99215 OFFICE O/P EST HI 40 MIN: CPT | Performed by: NURSE PRACTITIONER

## 2025-05-29 PROCEDURE — 1123F ACP DISCUSS/DSCN MKR DOCD: CPT | Performed by: NURSE PRACTITIONER

## 2025-05-29 PROCEDURE — 3074F SYST BP LT 130 MM HG: CPT | Performed by: NURSE PRACTITIONER

## 2025-05-29 RX ORDER — HYDROMORPHONE HYDROCHLORIDE 4 MG/1
4 TABLET ORAL 3 TIMES DAILY PRN
Qty: 90 TABLET | Refills: 0 | Status: SHIPPED | OUTPATIENT
Start: 2025-05-29 | End: 2025-06-28

## 2025-05-29 ASSESSMENT — ENCOUNTER SYMPTOMS
EYES NEGATIVE: 1
CONSTIPATION: 0
DIARRHEA: 1
BACK PAIN: 1
NAUSEA: 0
ABDOMINAL DISTENTION: 1
COUGH: 1
VOMITING: 0
SHORTNESS OF BREATH: 0
ABDOMINAL PAIN: 1

## 2025-05-29 NOTE — PROGRESS NOTES
[facility]  [address] [place]. [address]    Patient Name: Kate Espinosa  MRN: [id number]  Encounter Date: 5/29/2025     SUBJECTIVE:  Kate Espinosa is a 69 y.o., female being seen today regarding   Chief Complaint   Patient presents with    Pain     Abdominal pain    and routine medication management.    History of Present Illness    The patient is a 69-year-old female who presents for evaluation of pain.    She reports that her current medication, Dilaudid 4 mg, has been effective in managing her pain. She takes it as needed and requires a refill. She does not experience any side effects such as constipation, drowsiness, fatigue, or dizziness. She also expresses no concerns regarding potential addiction to the medication, as she uses it sparingly and only when necessary.    MEDICATIONS  Dilaudid    Functionality Assessment & Goals:  On a scale of 0 (Does not Interfere) to 10 (Completely Interferes)  Which number describes how during the past week pain has interfered with the following:   A.  General Activity: 5    B.  Mood:  5   C.  Walking Ability:  7   D.  Normal Work (Includes both work outside the home and housework):  7   E.  Relations with Other People:  5   F.  Sleep:  10    G.  Enjoyment of Life:  8  2.  Patient prefers to Take their Pain Medications:   [] On a regular basis   [x] Only when necessary   [] Does not take pain medications  3.  What are the Patient's Goals/ Expectations for Visiting Pain Management?   [] Learn about my pain   [] Physical Therapy   [] Receive Injections   [] Deal with Anxiety and Stress   [x] Receive Medication   [] Treat Depression    [] Treat Sleep   [] Treat Opioid Dependence/ Addiction    Most recent Oswestry Disability Questionnaire completed by patient on      Current Pain Assessment:         5/29/2025     2:33 PM   AMB PAIN ASSESSMENT   Location of Pain --   Location Modifiers Anterior   Severity of Pain 3   Quality of Pain

## 2025-07-25 ENCOUNTER — OFFICE VISIT (OUTPATIENT)
Dept: PAIN MANAGEMENT | Age: 70
End: 2025-07-25

## 2025-07-25 VITALS
SYSTOLIC BLOOD PRESSURE: 126 MMHG | RESPIRATION RATE: 12 BRPM | WEIGHT: 173 LBS | HEART RATE: 62 BPM | BODY MASS INDEX: 25.62 KG/M2 | DIASTOLIC BLOOD PRESSURE: 64 MMHG | OXYGEN SATURATION: 96 % | HEIGHT: 69 IN

## 2025-07-25 DIAGNOSIS — Z79.891 CHRONIC PRESCRIPTION OPIATE USE: ICD-10-CM

## 2025-07-25 DIAGNOSIS — M79.2 CHRONIC NEUROPATHIC PAIN: ICD-10-CM

## 2025-07-25 DIAGNOSIS — R10.9 CHRONIC ABDOMINAL PAIN: ICD-10-CM

## 2025-07-25 DIAGNOSIS — G89.29 CHRONIC ABDOMINAL PAIN: ICD-10-CM

## 2025-07-25 DIAGNOSIS — R52 PAIN MANAGEMENT: ICD-10-CM

## 2025-07-25 DIAGNOSIS — G89.29 CHRONIC NEUROPATHIC PAIN: ICD-10-CM

## 2025-07-25 DIAGNOSIS — G62.89 OTHER POLYNEUROPATHY: Primary | ICD-10-CM

## 2025-07-25 RX ORDER — HYDROMORPHONE HYDROCHLORIDE 4 MG/1
4 TABLET ORAL 3 TIMES DAILY PRN
Qty: 90 TABLET | Refills: 0 | Status: SHIPPED | OUTPATIENT
Start: 2025-07-25 | End: 2025-08-24

## 2025-07-25 RX ORDER — INSULIN GLARGINE 100 [IU]/ML
INJECTION, SOLUTION SUBCUTANEOUS
COMMUNITY
Start: 2025-06-13

## 2025-07-25 RX ORDER — SENNOSIDES 8.6 MG
1 TABLET ORAL 2 TIMES DAILY PRN
COMMUNITY
Start: 2025-06-23

## 2025-07-25 ASSESSMENT — ENCOUNTER SYMPTOMS
NAUSEA: 0
VOMITING: 0
EYES NEGATIVE: 1
COUGH: 1
ABDOMINAL PAIN: 1
SHORTNESS OF BREATH: 0
BACK PAIN: 1
CONSTIPATION: 0
DIARRHEA: 1
ABDOMINAL DISTENTION: 1

## 2025-07-25 NOTE — PROGRESS NOTES
[facility]  [address] [place]. [address]    Patient Name: Kate Espionsa  MRN: [id number]  Encounter Date: 7/25/2025     SUBJECTIVE:  Kate Espinosa is a 69 y.o., female being seen today regarding   Chief Complaint   Patient presents with    Abdominal Pain    and routine medication management.    History of Present Illness    The patient is a 69-year-old female who presents for a medication refill.    She is seeking a refill of her Dilaudid prescription, which she reports as being effective in managing her pain. Recently, she has increased her dosage due to experiencing some liver rejection. Her medical history includes two liver transplants and a kidney transplant.    Functionality Assessment & Goals:  On a scale of 0 (Does not Interfere) to 10 (Completely Interferes)  Which number describes how during the past week pain has interfered with the following:   A.  General Activity: 5    B.  Mood:  5   C.  Walking Ability:  7   D.  Normal Work (Includes both work outside the home and housework):  7   E.  Relations with Other People:  5   F.  Sleep:  10    G.  Enjoyment of Life:  8  2.  Patient prefers to Take their Pain Medications:   [] On a regular basis   [x] Only when necessary   [] Does not take pain medications  3.  What are the Patient's Goals/ Expectations for Visiting Pain Management?   [] Learn about my pain   [] Physical Therapy   [] Receive Injections   [] Deal with Anxiety and Stress   [x] Receive Medication   [] Treat Depression    [] Treat Sleep   [] Treat Opioid Dependence/ Addiction    Most recent [employer] Disability Questionnaire completed by patient on      Current Pain Assessment:         7/25/2025     1:11 PM   AMB PAIN ASSESSMENT   Location of Pain Other (Comment)   Location Modifiers Anterior;Medial   Severity of Pain 5   Quality of Pain Dull;Aching   Duration of Pain Persistent   Frequency of Pain Constant   Aggravating Factors

## (undated) DEVICE — OPHTHALMIC KNIFE 15°: Brand: ALCON

## (undated) DEVICE — GAUZE,SPONGE,4"X4",16PLY,XRAY,STRL,LF: Brand: MEDLINE

## (undated) DEVICE — DRAPE EQUIP STAND XL MAYO CVR

## (undated) DEVICE — CLEARCUT® SLIT KNIFE DUAL BEVEL 2.75MM ANGLED: Brand: CLEARCUT®

## (undated) DEVICE — GLOVE SURG SZ 6 THK91MIL LTX FREE SYN POLYISOPRENE ANTI

## (undated) DEVICE — TOWEL SURG W16XL26IN WHT NONFENESTRATED ST 4 PER PK

## (undated) DEVICE — NO USE 18 MONTHS GOWN STD LG  1153D

## (undated) DEVICE — GLOVE SURG SZ 65 THK91MIL LTX FREE SYN POLYISOPRENE

## (undated) DEVICE — CONVERTED USE 248065 TOWELS OR BL ST

## (undated) DEVICE — PREP SOL PVP IODINE 4%  4 OZ/BTL

## (undated) DEVICE — SURGICAL PROCEDURE PACK EYE DEFIANCE

## (undated) DEVICE — 0.9 MM 30° KELMAN® TAPERED ABS® TIP: Brand: ALCON, INFINITI, MICROSMOOTH, ABS

## (undated) DEVICE — Z DISCONTINUED BY MEDLINE USE 2711682 TRAY SKIN PREP DRY W/ PREM GLV

## (undated) DEVICE — STERILE LATEX POWDER-FREE SURGICAL GLOVESWITH NITRILE COATING: Brand: PROTEXIS

## (undated) DEVICE — SHIELD EYE W3XL2.5IN UNIV CLR PLAS LTWT

## (undated) DEVICE — SOLUTION IRRIGATION BAL SALT SOLUTION 500 ML STRL BSS